# Patient Record
Sex: FEMALE | NOT HISPANIC OR LATINO | Employment: FULL TIME | URBAN - METROPOLITAN AREA
[De-identification: names, ages, dates, MRNs, and addresses within clinical notes are randomized per-mention and may not be internally consistent; named-entity substitution may affect disease eponyms.]

---

## 2017-01-06 DIAGNOSIS — Z98.890 S/P CRANIOTOMY: Primary | ICD-10-CM

## 2017-01-13 ENCOUNTER — HOSPITAL ENCOUNTER (OUTPATIENT)
Dept: CT IMAGING | Facility: CLINIC | Age: 52
Discharge: HOME | End: 2017-01-13
Attending: NEUROLOGICAL SURGERY | Admitting: NEUROLOGICAL SURGERY
Payer: COMMERCIAL

## 2017-01-13 ENCOUNTER — OFFICE VISIT (OUTPATIENT)
Dept: NEUROSURGERY | Facility: CLINIC | Age: 52
End: 2017-01-13
Attending: NURSE PRACTITIONER
Payer: COMMERCIAL

## 2017-01-13 VITALS
DIASTOLIC BLOOD PRESSURE: 82 MMHG | SYSTOLIC BLOOD PRESSURE: 131 MMHG | BODY MASS INDEX: 34.86 KG/M2 | WEIGHT: 204.2 LBS | HEART RATE: 114 BPM | HEIGHT: 64 IN | TEMPERATURE: 97.2 F | OXYGEN SATURATION: 97 %

## 2017-01-13 DIAGNOSIS — Z98.890 S/P CRANIOTOMY: ICD-10-CM

## 2017-01-13 DIAGNOSIS — S06.5XAA SDH (SUBDURAL HEMATOMA) (H): Primary | ICD-10-CM

## 2017-01-13 PROCEDURE — 70450 CT HEAD/BRAIN W/O DYE: CPT

## 2017-01-13 PROCEDURE — 99213 OFFICE O/P EST LOW 20 MIN: CPT | Performed by: NURSE PRACTITIONER

## 2017-01-13 PROCEDURE — 99211 OFF/OP EST MAY X REQ PHY/QHP: CPT | Performed by: NURSE PRACTITIONER

## 2017-01-13 ASSESSMENT — PAIN SCALES - GENERAL: PAINLEVEL: NO PAIN (0)

## 2017-01-13 NOTE — PATIENT INSTRUCTIONS
Plan:  -followup as needed  - Go to ER with any seizure activity, mental status change (increasing confusion), difficulty with speech or increasing or acute weakness

## 2017-01-13 NOTE — MR AVS SNAPSHOT
After Visit Summary   1/13/2017    Mary Franco    MRN: [id number]           Patient Information     Date Of Birth          1965        Visit Information        Provider Department      1/13/2017 3:00 PM Eufemia Aparicio APRN CNP [facility] Neurosurgery [facility] [place] [facility]        Care Instructions    Plan:  -followup as needed  - Go to ER with any seizure activity, mental status change (increasing confusion), difficulty with speech or increasing or acute weakness         Follow-ups after your visit        Who to contact     If you have questions or need follow up information about today's clinic visit or your schedule please contact [facility] NEUROSURGERY [facility] directly at [phone].  Normal or non-critical lab and imaging results will be communicated to you by [email or web address], letter or phone within 4 business days after the clinic has received the results. If you do not hear from us within 7 days, please contact the clinic through [email or web address] or phone. If you have a critical or abnormal lab result, we will notify you by phone as soon as possible.  Submit refill requests through [employer] or call your pharmacy and they will forward the refill request to us. Please allow 3 business days for your refill to be completed.          Additional Information About Your Visit        MyChart Information     [employer] gives you secure access to your electronic health record. If you see a primary care provider, you can also send messages to your care team and make appointments. If you have questions, please call your primary care clinic.  If you do not have a primary care provider, please call [phone] and they will assist you.        Care EveryWhere ID     This is your Care EveryWhere ID. This could be used by other organizations to access your [facility] medical records  [id number]        Your Vitals Were     Pulse Temperature Height BMI (Body Mass Index) Pulse Oximetry       114 97.2  F (36.2  C) (Oral) 5'  "4\" (1.626 m) 35.03 kg/m2 97%        Blood Pressure from Last 3 Encounters:   01/13/17 131/82   11/11/15 112/78   11/03/15 125/87    Weight from Last 3 Encounters:   01/13/17 204 lb 3.2 oz (92.625 kg)   11/11/15 198 lb 6.4 oz (89.994 kg)   11/03/15 194 lb 9.6 oz (88.27 kg)              Today, you had the following     No orders found for display       Primary Care Provider Office Phone # Fax #    Nisa Crook [phone]       [facility] [address]  [facility] [address]        Thank you!     Thank you for choosing [facility] NEUROSURGERY [facility]  for your care. Our goal is always to provide you with excellent care. Hearing back from our patients is one way we can continue to improve our services. Please take a few minutes to complete the written survey that you may receive in the mail after your visit with us. Thank you!             Your Updated Medication List - Protect others around you: Learn how to safely use, store and throw away your medicines at www.disposemymeds.org.          This list is accurate as of: 1/13/17  3:08 PM.  Always use your most recent med list.                   Brand Name Dispense Instructions for use    albuterol 108 (90 BASE) MCG/ACT Inhaler    PROAIR HFA/PROVENTIL HFA/VENTOLIN HFA    1 each    Inhale 2 puffs into the lungs every 6 hours as needed for wheezing       calcium-magnesium 500-250 MG Tabs per tablet    CALMAG     Take 1 tablet by mouth daily       cetirizine 10 MG tablet    ZYRTEC ALLERGY    30 tablet    Take 1 tablet (10 mg) by mouth daily       fluticasone 50 MCG/ACT spray    FLONASE    3 Package    Spray 2 sprays into both nostrils daily       multivitamin, therapeutic Tabs tablet      Take 1 tablet by mouth daily       OMEGA-3 FISH OIL PO      Take 1 g by mouth daily         "

## 2017-01-13 NOTE — PROGRESS NOTES
[facility]  Neurosurgery followup:    HPI: Ms. Franco is a 51 year old female that returns post Left frontotemporal parietal craniotomy for evacuation of subdural hematoma on 4/4/2015 with Dr. Willson. She is here today for follow up. She reports that she is doing very good. She has not had any headaches since after surgery. She does feel intermittent nerve type pain to the incision.  She is back to work full time.     Exam:  Constitutional:  Alert, well nourished, NAD.  HEENT: Normocephalic, atraumatic.   Pulm:  Without shortness of breath or audible adventitious respiratory sounds.  CV:  No pitting edema of BLE.  Brisk capillary refill, CMS intact.    Neurological:  Awake  Alert  Oriented x 3  Speech clear  Cranial nerves II - XII intact  PERRL  EOMI  Face symmetric  Tongue midline  Motor exam  5/5 strength in all four extremities.   Sensation normal  Finger to Nose smooth  Pronator drift negative    Gait: Able to stand from a seated position. Normal non-antalgic, non-myelopathic gait.    Imaging    CT scan:  IMPRESSION:     1. Postoperative changes in the left frontal and parietal regions..  2. Brain parenchyma remains normal. No focal lesions are seen.       A/P: Ms. Frnaco is a 51 year old female that returns post Left frontotemporal parietal craniotomy for evacuation of subdural hematoma on 4/4/2015 with Dr. Willson. She is here today for follow up. She reports that she is doing very good. She has not had any headaches since after surgery. She does feel intermittent nerve type pain to the incision.  She is back to work full time. She is almost 2 years post surgery and doing well. At this time we will have her follow up as needed. She is open to this.     Plan:  -followup as needed  - Go to ER with any seizure activity, mental status change (increasing confusion), difficulty with speech or increasing or acute weakness     Eufemia Aparicio [facility]  Spine and Brain Clinic  [facility]  [address]  [address]  [place] [address]    Tel [phone]  Pager [phone]

## 2017-01-13 NOTE — NURSING NOTE
"Mary Franco is a 51 year old female who presents for:  Chief Complaint   Patient presents with     Neurologic Problem     1 year follow up status post craniotomy, patient is feeling well no symptoms        Initial Vitals:  /82 mmHg  Pulse 114  Temp(Src) 97.2  F (36.2  C) (Oral)  Ht 5' 4\" (1.626 m)  Wt 204 lb 3.2 oz (92.625 kg)  BMI 35.03 kg/m2  SpO2 97% Estimated body mass index is 35.03 kg/(m^2) as calculated from the following:    Height as of this encounter: 5' 4\" (1.626 m).    Weight as of this encounter: 204 lb 3.2 oz (92.625 kg).. Body surface area is 2.05 meters squared. BP completed using cuff size: large  No Pain (0)    Do you feel safe in your environment?  Yes  Do you need any refills today? No    Nursing Comments: 1 year follow up status post craniotomy, patient is feeling well no symptoms.  Patient rates her pain today as 0      5 min. nursing intake time  Toshia Angelo MA       Discharge plan: Plan:  -followup as needed  - Go to ER with any seizure activity, mental status change (increasing confusion), difficulty with speech or increasing or acute weakness   2 min. nursing discharge time  Toshia Angelo MA            "

## 2017-03-10 ENCOUNTER — TELEPHONE (OUTPATIENT)
Dept: FAMILY MEDICINE | Facility: CLINIC | Age: 52
End: 2017-03-10

## 2017-03-10 NOTE — TELEPHONE ENCOUNTER
3/10/2017    Call Regarding Preventive Health Screening Colonoscopy, Mammogram and Physical    Attempt 1    Message on voicemail     Comments:       Outreach   KV

## 2017-06-15 NOTE — TELEPHONE ENCOUNTER
6/15/2017    Call Regarding Preventive Health Screening Colonoscopy and Mammogram    Attempt 2    Message on voicemail     Comments:       Outreach   cnt

## 2017-07-17 ENCOUNTER — OFFICE VISIT (OUTPATIENT)
Dept: FAMILY MEDICINE | Facility: CLINIC | Age: 52
End: 2017-07-17
Payer: COMMERCIAL

## 2017-07-17 ENCOUNTER — HOSPITAL ENCOUNTER (OUTPATIENT)
Dept: MAMMOGRAPHY | Facility: CLINIC | Age: 52
Discharge: HOME | End: 2017-07-17
Attending: INTERNAL MEDICINE | Admitting: INTERNAL MEDICINE
Payer: COMMERCIAL

## 2017-07-17 VITALS
HEIGHT: 64 IN | BODY MASS INDEX: 34.35 KG/M2 | WEIGHT: 201.2 LBS | DIASTOLIC BLOOD PRESSURE: 76 MMHG | SYSTOLIC BLOOD PRESSURE: 127 MMHG | OXYGEN SATURATION: 95 % | TEMPERATURE: 98.1 F | HEART RATE: 91 BPM

## 2017-07-17 DIAGNOSIS — Z00.00 PREVENTATIVE HEALTH CARE: ICD-10-CM

## 2017-07-17 DIAGNOSIS — S06.5XAA SUBDURAL HEMATOMA (H): ICD-10-CM

## 2017-07-17 DIAGNOSIS — R73.09 ABNORMAL GLUCOSE: Chronic | ICD-10-CM

## 2017-07-17 DIAGNOSIS — Z00.01 ENCOUNTER FOR PREVENTATIVE ADULT HEALTH CARE EXAM WITH ABNORMAL FINDINGS: Primary | ICD-10-CM

## 2017-07-17 DIAGNOSIS — J45.20 MILD INTERMITTENT ASTHMA WITHOUT COMPLICATION: Chronic | ICD-10-CM

## 2017-07-17 DIAGNOSIS — D50.9 MICROCYTIC ANEMIA: ICD-10-CM

## 2017-07-17 LAB
ERYTHROCYTE [DISTWIDTH] IN BLOOD BY AUTOMATED COUNT: 20.3 % (ref 10–15)
HBA1C MFR BLD: 7.3 % (ref 4.3–6)
HCT VFR BLD AUTO: 33.5 % (ref 35–47)
HGB BLD-MCNC: 10.7 G/DL (ref 11.7–15.7)
MCH RBC QN AUTO: 22.9 PG (ref 26.5–33)
MCHC RBC AUTO-ENTMCNC: 31.9 G/DL (ref 31.5–36.5)
MCV RBC AUTO: 72 FL (ref 78–100)
PLATELET # BLD AUTO: 435 10E9/L (ref 150–450)
RBC # BLD AUTO: 4.68 10E12/L (ref 3.8–5.2)
WBC # BLD AUTO: 5.8 10E9/L (ref 4–11)

## 2017-07-17 PROCEDURE — G0202 SCR MAMMO BI INCL CAD: HCPCS

## 2017-07-17 PROCEDURE — 83036 HEMOGLOBIN GLYCOSYLATED A1C: CPT | Performed by: INTERNAL MEDICINE

## 2017-07-17 PROCEDURE — 99396 PREV VISIT EST AGE 40-64: CPT | Performed by: INTERNAL MEDICINE

## 2017-07-17 PROCEDURE — 36415 COLL VENOUS BLD VENIPUNCTURE: CPT | Performed by: INTERNAL MEDICINE

## 2017-07-17 PROCEDURE — 80061 LIPID PANEL: CPT | Performed by: INTERNAL MEDICINE

## 2017-07-17 PROCEDURE — 85027 COMPLETE CBC AUTOMATED: CPT | Performed by: INTERNAL MEDICINE

## 2017-07-17 PROCEDURE — 80053 COMPREHEN METABOLIC PANEL: CPT | Performed by: INTERNAL MEDICINE

## 2017-07-17 RX ORDER — ALBUTEROL SULFATE 90 UG/1
2 AEROSOL, METERED RESPIRATORY (INHALATION) EVERY 6 HOURS PRN
Qty: 18 G | Refills: 3 | Status: SHIPPED | OUTPATIENT
Start: 2017-07-17 | End: 2022-03-18

## 2017-07-17 NOTE — LETTER
My Asthma Action Plan  Name: Mary Franco   YOB: 1965  Date: 7/17/2017   My doctor: Nisa Crook, DO   My clinic: [facility]        My Control Medicine: None  My Rescue Medicine: Albuterol   My Asthma Severity: intermittent  Avoid your asthma triggers: pollens, insects/rodents, mold, humidity and extreme exercise             GREEN ZONE   Good Control    I feel good    No cough or wheeze    Can work, sleep and play without asthma symptoms       Take your asthma control medicine every day.     1. If exercise triggers your asthma, take your rescue medication    15 minutes before exercise or sports, and    During exercise if you have asthma symptoms  2. Spacer to use with inhaler: If you have a spacer, make sure to use it with your inhaler             YELLOW ZONE Getting Worse  I have ANY of these:    I do not feel good    Cough or wheeze    Chest feels tight    Wake up at night   1. Keep taking your Green Zone medications  2. Start taking your rescue medicine:    every 20 minutes for up to 1 hour. Then every 4 hours for 24-48 hours.  3. If you stay in the Yellow Zone for more than 12-24 hours, contact your doctor.  4. If you do not return to the Green Zone in 12-24 hours or you get worse, start taking your oral steroid medicine if prescribed by your provider.           RED ZONE Medical Alert - Get Help  I have ANY of these:    I feel awful    Medicine is not helping    Breathing getting harder    Trouble walking or talking    Nose opens wide to breathe       1. Take your rescue medicine NOW  2. If your provider has prescribed an oral steroid medicine, start taking it NOW  3. Call your doctor NOW  4. If you are still in the Red Zone after 20 minutes and you have not reached your doctor:    Take your rescue medicine again and    Call 911 or go to the emergency room right away    See your regular doctor within 2 weeks of an Emergency Room or Urgent Care visit for follow-up treatment.         Electronically signed by: Nisa Crook, July 17, 2017    Annual Reminders:  Meet with Asthma Educator,  Flu Shot in the Fall, consider Pneumonia Vaccination for patients with asthma (aged 19 and older).    Pharmacy:    [facility] [place] - [address]  [id number] - [place], [place] - [address]                    Asthma Triggers  How To Control Things That Make Your Asthma Worse    Triggers are things that make your asthma worse.  Look at the list below to help you find your triggers and what you can do about them.  You can help prevent asthma flare-ups by staying away from your triggers.      Trigger                                                          What you can do   Cigarette Smoke  Tobacco smoke can make asthma worse. Do not allow smoking in your home, car or around you.  Be sure no one smokes at a child s day care or school.  If you smoke, ask your health care provider for ways to help you quit.  Ask family members to quit too.  Ask your health care provider for a referral to Quit Plan to help you quit smoking, or call 6-929-027-PLAN.     Colds, Flu, Bronchitis  These are common triggers of asthma. Wash your hands often.  Don t touch your eyes, nose or mouth.  Get a flu shot every year.     Dust Mites  These are tiny bugs that live in cloth or carpet. They are too small to see. Wash sheets and blankets in hot water every week.   Encase pillows and mattress in dust mite proof covers.  Avoid having carpet if you can. If you have carpet, vacuum weekly.   Use a dust mask and HEPA vacuum.   Pollen and Outdoor Mold  Some people are allergic to trees, grass, or weed pollen, or molds. Try to keep your windows closed.  Limit time out doors when pollen count is high.   Ask you health care provider about taking medicine during allergy season.     Animal Dander  Some people are allergic to skin flakes, urine or saliva from pets with fur or  feathers. Keep pets with fur or feathers out of your home.    If you can t keep the pet outdoors, then keep the pet out of your bedroom.  Keep the bedroom door closed.  Keep pets off cloth furniture and away from stuffed toys.     Mice, Rats, and Cockroaches  Some people are allergic to the waste from these pests.   Cover food and garbage.  Clean up spills and food crumbs.  Store grease in the refrigerator.   Keep food out of the bedroom.   Indoor Mold  This can be a trigger if your home has high moisture. Fix leaking faucets, pipes, or other sources of water.   Clean moldy surfaces.  Dehumidify basement if it is damp and smelly.   Smoke, Strong Odors, and Sprays  These can reduce air quality. Stay away from strong odors and sprays, such as perfume, powder, hair spray, paints, smoke incense, paint, cleaning products, candles and new carpet.   Exercise or Sports  Some people with asthma have this trigger. Be active!  Ask your doctor about taking medicine before sports or exercise to prevent symptoms.    Warm up for 5-10 minutes before and after sports or exercise.     Other Triggers of Asthma  Cold air:  Cover your nose and mouth with a scarf.  Sometimes laughing or crying can be a trigger.  Some medicines and food can trigger asthma.

## 2017-07-17 NOTE — PATIENT INSTRUCTIONS
Labs today  Let me know if snoring is worse  Try the Nasacort instead of the Flonase to see if less prone to nose bleeds  I refilled your Albuterol  Follow up annually or as needed    Preventive Health Recommendations  Female Ages 50 - 64    Yearly exam: See your health care provider every year in order to  o Review health changes.   o Discuss preventive care.    o Review your medicines if your doctor has prescribed any.      Get a Pap test every three years (unless you have an abnormal result and your provider advises testing more often).    If you get Pap tests with HPV test, you only need to test every 5 years, unless you have an abnormal result.     You do not need a Pap test if your uterus was removed (hysterectomy) and you have not had cancer.    You should be tested each year for STDs (sexually transmitted diseases) if you're at risk.     Have a mammogram every 1 to 2 years.    Have a colonoscopy at age 50, or have a yearly FIT test (stool test). These exams screen for colon cancer.      Have a cholesterol test every 5 years, or more often if advised.    Have a diabetes test (fasting glucose) every three years. If you are at risk for diabetes, you should have this test more often.     If you are at risk for osteoporosis (brittle bone disease), think about having a bone density scan (DEXA).    Shots: Get a flu shot each year. Get a tetanus shot every 10 years.    Nutrition:     Eat at least 5 servings of fruits and vegetables each day.    Eat whole-grain bread, whole-wheat pasta and brown rice instead of white grains and rice.    Talk to your provider about Calcium and Vitamin D.     Lifestyle    Exercise at least 150 minutes a week (30 minutes a day, 5 days a week). This will help you control your weight and prevent disease.    Limit alcohol to one drink per day.    No smoking.     Wear sunscreen to prevent skin cancer.     See your dentist every six months for an exam and cleaning.    See your eye doctor  every 1 to 2 years.

## 2017-07-17 NOTE — MR AVS SNAPSHOT
After Visit Summary   7/17/2017    Mary Franco    MRN: [id number]           Patient Information     Date Of Birth          1965        Visit Information        Provider Department      7/17/2017 11:30 AM Nisa Crook,  [facility]        Today's Diagnoses     Encounter for preventative adult health care exam with abnormal findings    -  1    Mild intermittent asthma without complication        Subdural hematoma (H)        Abnormal glucose          Care Instructions    Labs today  Let me know if snoring is worse  Try the Nasacort instead of the Flonase to see if less prone to nose bleeds  I refilled your Albuterol  Follow up annually or as needed    Preventive Health Recommendations  Female Ages 50 - 64    Yearly exam: See your health care provider every year in order to  o Review health changes.   o Discuss preventive care.    o Review your medicines if your doctor has prescribed any.      Get a Pap test every three years (unless you have an abnormal result and your provider advises testing more often).    If you get Pap tests with HPV test, you only need to test every 5 years, unless you have an abnormal result.     You do not need a Pap test if your uterus was removed (hysterectomy) and you have not had cancer.    You should be tested each year for STDs (sexually transmitted diseases) if you're at risk.     Have a mammogram every 1 to 2 years.    Have a colonoscopy at age 50, or have a yearly FIT test (stool test). These exams screen for colon cancer.      Have a cholesterol test every 5 years, or more often if advised.    Have a diabetes test (fasting glucose) every three years. If you are at risk for diabetes, you should have this test more often.     If you are at risk for osteoporosis (brittle bone disease), think about having a bone density scan (DEXA).    Shots: Get a flu shot each year. Get a tetanus shot every 10 years.    Nutrition:     Eat at least 5 servings of  fruits and vegetables each day.    Eat whole-grain bread, whole-wheat pasta and brown rice instead of white grains and rice.    Talk to your provider about Calcium and Vitamin D.     Lifestyle    Exercise at least 150 minutes a week (30 minutes a day, 5 days a week). This will help you control your weight and prevent disease.    Limit alcohol to one drink per day.    No smoking.     Wear sunscreen to prevent skin cancer.     See your dentist every six months for an exam and cleaning.    See your eye doctor every 1 to 2 years.            Follow-ups after your visit        Your next 10 appointments already scheduled     Aug 14, 2017   Procedure with Katja Paredes MD   [facility] ([facility])    [address]  [place] [address] [phone]           [facility] is located at [address]              Who to contact     If you have questions or need follow up information about today's clinic visit or your schedule please contact [facility] directly at [phone].  Normal or non-critical lab and imaging results will be communicated to you by [employer], letter or phone within 4 business days after the clinic has received the results. If you do not hear from us within 7 days, please contact the clinic through [employer] or phone. If you have a critical or abnormal lab result, we will notify you by phone as soon as possible.  Submit refill requests through [employer] or call your pharmacy and they will forward the refill request to us. Please allow 3 business days for your refill to be completed.          Additional Information About Your Visit        [employer] Information     [employer] gives you secure access to your electronic health record. If you see a primary care provider, you can also send messages to your care team and make appointments. If you have questions, please call your primary care clinic.  If you do not have a primary care  "provider, please call [phone] and they will assist you.        Care EveryWhere ID     This is your Care EveryWhere ID. This could be used by other organizations to access your [facility] medical records  [id number]        Your Vitals Were     Pulse Temperature Height Pulse Oximetry Breastfeeding? BMI (Body Mass Index)    91 98.1  F (36.7  C) (Oral) 5' 4\" (1.626 m) 95% No 34.54 kg/m2       Blood Pressure from Last 3 Encounters:   07/17/17 127/76   01/13/17 131/82   11/11/15 112/78    Weight from Last 3 Encounters:   07/17/17 201 lb 3.2 oz (91.3 kg)   01/13/17 204 lb 3.2 oz (92.6 kg)   11/11/15 198 lb 6.4 oz (90 kg)              We Performed the Following     Asthma Action Plan (AAP)     CBC with platelets     Comprehensive metabolic panel     Hemoglobin A1c     Lipid panel reflex to direct LDL          Today's Medication Changes          These changes are accurate as of: 7/17/17 12:18 PM.  If you have any questions, ask your nurse or doctor.               Stop taking these medicines if you haven't already. Please contact your care team if you have questions.     fluticasone 50 MCG/ACT spray   Commonly known as:  FLONASE   Stopped by:  Nisa Crook DO                Where to get your medicines      These medications were sent to [facility] [address] [place], [place] - [address]  [address] [facility] [address]     Phone:  [phone]     albuterol 108 (90 BASE) MCG/ACT Inhaler                Primary Care Provider Office Phone # Fax #    Nisa Crook [phone]       [facility] [address]  [facility] [address]        Equal Access to Services     OUMOU LERMA AH: Hadii aad ku tezo Soirvinali, waaxda luqadaha, qaybta kaalmada adeegyada, kristin stark. So [facility] [phone].    ATENCIÓN: Si habla español, tiene a burk disposición servicios gratuitos de asistencia lingüística. Bean chun [phone].    We comply " with applicable federal civil rights laws and [place] laws. We do not discriminate on the basis of race, color, national origin, age, disability sex, sexual orientation or gender identity.            Thank you!     Thank you for choosing [facility]  for your care. Our goal is always to provide you with excellent care. Hearing back from our patients is one way we can continue to improve our services. Please take a few minutes to complete the written survey that you may receive in the mail after your visit with us. Thank you!             Your Updated Medication List - Protect others around you: Learn how to safely use, store and throw away your medicines at www.disposemymeds.org.          This list is accurate as of: 7/17/17 12:18 PM.  Always use your most recent med list.                   Brand Name Dispense Instructions for use Diagnosis    albuterol 108 (90 BASE) MCG/ACT Inhaler    PROAIR HFA/PROVENTIL HFA/VENTOLIN HFA    18 g    Inhale 2 puffs into the lungs every 6 hours as needed for wheezing    Mild intermittent asthma without complication       calcium-magnesium 500-250 MG Tabs per tablet    CALMAG     Take 1 tablet by mouth daily        cetirizine 10 MG tablet    ZYRTEC ALLERGY    30 tablet    Take 1 tablet (10 mg) by mouth daily    Nasal congestion       multivitamin, therapeutic Tabs tablet      Take 1 tablet by mouth daily        OMEGA-3 FISH OIL PO      Take 1 g by mouth daily

## 2017-07-17 NOTE — PROGRESS NOTES
SUBJECTIVE:   CC: Mary Franco is an 51 year old woman who presents for preventive health visit.     Healthy Habits:    Do you get at least three servings of calcium containing foods daily (dairy, green leafy vegetables, etc.)? yes    Amount of exercise or daily activities, outside of work: 4-5 day(s) per week --> walking up to 15,000-20,000 steps per day    Problems taking medications regularly: Yes, Flonase stopped d/t development of a bloody nose - went to  and no recurrent bleeding since    Medication side effects: As above re: flonase    Have you had an eye exam in the past two years? yes    Do you see a dentist twice per year? yes    Do you have sleep apnea, excessive snoring or daytime drowsiness? Yes - sneezing some; does snore and daughter-in-law notices it. No witnessed apneas.      Mary has been well.  +Seasonal allergies ; asthma ok  No breast changes - mammogram done this AM  Colonoscopy set up as is now 50 yo  Still having periods which are regular in timing but varies in flow heaviness - last was July 4th. Cycles can be heavy at times.  No chest pain or dyspnea or skin changes.  Working on weight loss.     Today's PHQ-2 Score:   PHQ-2 ( 1999 Pfizer) 7/17/2017 11/11/2015   Q1: Little interest or pleasure in doing things 0 0   Q2: Feeling down, depressed or hopeless 0 0   PHQ-2 Score 0 0       Abuse: Current or Past(Physical, Sexual or Emotional) - No  Do you feel safe in your environment - Yes    Social History   Substance Use Topics     Smoking status: Never Smoker     Smokeless tobacco: Never Used     Alcohol use No     The patient does not drink >3 drinks per day nor >7 drinks per week.    Reviewed orders with patient.  Reviewed health maintenance and updated orders accordingly - Yes  Patient over age 50, mutual decision to screen reflected in health maintenance.  Pertinent mammograms are reviewed under the imaging tab.  History of abnormal Pap smear: NO - age 30-65 PAP every 5 years with  "negative HPV co-testing recommended    ROS:  Comprehensive ROS negative unless as stated above in HPI.      OBJECTIVE:   /76 (BP Location: Right arm, Patient Position: Chair, Cuff Size: Adult Regular)  Pulse 91  Temp 98.1  F (36.7  C) (Oral)  Ht 5' 4\" (1.626 m)  Wt 201 lb 3.2 oz (91.3 kg)  SpO2 95%  Breastfeeding? No  BMI 34.54 kg/m2  EXAM:  GENERAL: healthy, alert and no distress  EYES: Eyes grossly normal to inspection, PERRL and conjunctivae and sclerae normal  HENT: ear canals and TM's normal, nose and mouth without ulcers or lesions  NECK: no adenopathy, no asymmetry, masses, or scars and thyroid normal to palpation  RESP: lungs clear to auscultation - no rales, rhonchi or wheezes  BREAST: normal without masses, tenderness or nipple discharge and no palpable axillary masses or adenopathy  CV: regular rate and rhythm, normal S1 S2, no S3 or S4, no murmur, click or rub, no peripheral edema   ABDOMEN: soft, nontender, no hepatosplenomegaly, no masses and bowel sounds normal; limited d/t obesity  MS: no gross musculoskeletal defects noted, no edema  SKIN: no suspicious lesions or rashes  NEURO: Normal strength and tone, mentation intact and speech normal  PSYCH: mentation appears normal, affect normal/bright    ASSESSMENT/PLAN:   1. Encounter for preventative adult health care exam with abnormal findings  Future colonoscopy  - CBC with platelets  - Comprehensive metabolic panel  - Lipid panel reflex to direct LDL    2. Mild intermittent asthma without complication  Stable  ACT Total Scores 6/23/2015 7/17/2017   ACT TOTAL SCORE 23 -   ASTHMA ER VISITS 0 = None -   ASTHMA HOSPITALIZATIONS 0 = None -   ACT TOTAL SCORE (Goal Greater than or Equal to 20) - 21   In the past 12 months, how many times did you visit the emergency room for your asthma without being admitted to the hospital? - 0   In the past 12 months, how many times were you hospitalized overnight because of your asthma? - 0     - Asthma Action " "Plan (AAP)  - albuterol (PROAIR HFA/PROVENTIL HFA/VENTOLIN HFA) 108 (90 BASE) MCG/ACT Inhaler; Inhale 2 puffs into the lungs every 6 hours as needed for wheezing  Dispense: 18 g; Refill: 3    3. Subdural hematoma (H)  Feels very well s/p treatment of this with craniotomy per neurosurg a couple years ago and can now f/u PRN with them  Headaches resolved  Historically had mild lingering balance issues afterwards but she did not bring that up today    4. Abnormal glucose  - Hemoglobin A1c    5. Microcytic anemia  Heavy periods  - Ferritin    COUNSELING:   Reviewed preventive health counseling, as reflected in patient instructions       Regular exercise       Healthy diet/nutrition   reports that she has never smoked. She has never used smokeless tobacco.  Estimated body mass index is 34.54 kg/(m^2) as calculated from the following:    Height as of this encounter: 5' 4\" (1.626 m).    Weight as of this encounter: 201 lb 3.2 oz (91.3 kg).   Weight management plan: Discussed healthy diet and exercise guidelines and patient will follow up in 12 months in clinic to re-evaluate.    Patient Instructions   Labs today  Let me know if snoring is worse  Try the Nasacort instead of the Flonase to see if less prone to nose bleeds  I refilled your Albuterol  Follow up annually or as needed      Nisa Crook, DO  [facility]  "

## 2017-07-17 NOTE — NURSING NOTE
"Chief Complaint   Patient presents with     Physical       Initial /76 (BP Location: Right arm, Patient Position: Chair, Cuff Size: Adult Regular)  Pulse 91  Temp 98.1  F (36.7  C) (Oral)  Ht 5' 4\" (1.626 m)  Wt 201 lb 3.2 oz (91.3 kg)  SpO2 95%  Breastfeeding? No  BMI 34.54 kg/m2 Estimated body mass index is 34.54 kg/(m^2) as calculated from the following:    Height as of this encounter: 5' 4\" (1.626 m).    Weight as of this encounter: 201 lb 3.2 oz (91.3 kg).  Medication Reconciliation: complete     EMILY Alfaro      "

## 2017-07-18 LAB
ALBUMIN SERPL-MCNC: 3.8 G/DL (ref 3.4–5)
ALP SERPL-CCNC: 73 U/L (ref 40–150)
ALT SERPL W P-5'-P-CCNC: 26 U/L (ref 0–50)
ANION GAP SERPL CALCULATED.3IONS-SCNC: 6 MMOL/L (ref 3–14)
AST SERPL W P-5'-P-CCNC: 17 U/L (ref 0–45)
BILIRUB SERPL-MCNC: 0.5 MG/DL (ref 0.2–1.3)
BUN SERPL-MCNC: 12 MG/DL (ref 7–30)
CALCIUM SERPL-MCNC: 9 MG/DL (ref 8.5–10.1)
CHLORIDE SERPL-SCNC: 102 MMOL/L (ref 94–109)
CHOLEST SERPL-MCNC: 213 MG/DL
CO2 SERPL-SCNC: 30 MMOL/L (ref 20–32)
CREAT SERPL-MCNC: 0.65 MG/DL (ref 0.52–1.04)
GFR SERPL CREATININE-BSD FRML MDRD: ABNORMAL ML/MIN/1.7M2
GLUCOSE SERPL-MCNC: 123 MG/DL (ref 70–99)
HDLC SERPL-MCNC: 51 MG/DL
LDLC SERPL CALC-MCNC: 144 MG/DL
NONHDLC SERPL-MCNC: 162 MG/DL
POTASSIUM SERPL-SCNC: 3.9 MMOL/L (ref 3.4–5.3)
PROT SERPL-MCNC: 7.8 G/DL (ref 6.8–8.8)
SODIUM SERPL-SCNC: 138 MMOL/L (ref 133–144)
TRIGL SERPL-MCNC: 88 MG/DL

## 2017-07-18 ASSESSMENT — ASTHMA QUESTIONNAIRES: ACT_TOTALSCORE: 21

## 2017-07-20 ENCOUNTER — TELEPHONE (OUTPATIENT)
Dept: FAMILY MEDICINE | Facility: CLINIC | Age: 52
End: 2017-07-20

## 2017-07-20 DIAGNOSIS — E78.5 HYPERLIPIDEMIA LDL GOAL <100: ICD-10-CM

## 2017-07-20 DIAGNOSIS — E11.9 TYPE 2 DIABETES MELLITUS WITHOUT COMPLICATION, WITHOUT LONG-TERM CURRENT USE OF INSULIN (H): Primary | ICD-10-CM

## 2017-07-20 DIAGNOSIS — D50.9 MICROCYTIC ANEMIA: ICD-10-CM

## 2017-07-20 DIAGNOSIS — E66.9 NON MORBID OBESITY, UNSPECIFIED OBESITY TYPE: Chronic | ICD-10-CM

## 2017-07-20 PROCEDURE — 82728 ASSAY OF FERRITIN: CPT | Performed by: INTERNAL MEDICINE

## 2017-07-20 PROCEDURE — 84443 ASSAY THYROID STIM HORMONE: CPT | Performed by: INTERNAL MEDICINE

## 2017-07-20 PROCEDURE — 36415 COLL VENOUS BLD VENIPUNCTURE: CPT | Performed by: INTERNAL MEDICINE

## 2017-07-21 LAB
FERRITIN SERPL-MCNC: 11 NG/ML (ref 8–252)
TSH SERPL DL<=0.005 MIU/L-ACNC: 0.74 MU/L (ref 0.4–4)

## 2017-07-26 PROBLEM — D50.9 IRON DEFICIENCY ANEMIA, UNSPECIFIED IRON DEFICIENCY ANEMIA TYPE: Status: ACTIVE | Noted: 2017-07-26

## 2017-08-14 ENCOUNTER — SURGERY (OUTPATIENT)
Age: 52
End: 2017-08-14

## 2017-08-14 ENCOUNTER — TRANSFERRED RECORDS (OUTPATIENT)
Dept: HEALTH INFORMATION MANAGEMENT | Facility: CLINIC | Age: 52
End: 2017-08-14

## 2017-08-14 ENCOUNTER — HOSPITAL ENCOUNTER (OUTPATIENT)
Facility: CLINIC | Age: 52
Discharge: HOME | End: 2017-08-14
Attending: COLON & RECTAL SURGERY | Admitting: COLON & RECTAL SURGERY
Payer: COMMERCIAL

## 2017-08-14 VITALS
DIASTOLIC BLOOD PRESSURE: 85 MMHG | OXYGEN SATURATION: 98 % | HEIGHT: 63 IN | WEIGHT: 198 LBS | SYSTOLIC BLOOD PRESSURE: 113 MMHG | BODY MASS INDEX: 35.08 KG/M2 | HEART RATE: 94 BPM | RESPIRATION RATE: 18 BRPM

## 2017-08-14 LAB — COLONOSCOPY: NORMAL

## 2017-08-14 PROCEDURE — G0500 MOD SEDAT ENDO SERVICE >5YRS: HCPCS | Performed by: COLON & RECTAL SURGERY

## 2017-08-14 PROCEDURE — 88305 TISSUE EXAM BY PATHOLOGIST: CPT | Performed by: COLON & RECTAL SURGERY

## 2017-08-14 PROCEDURE — 99153 MOD SED SAME PHYS/QHP EA: CPT

## 2017-08-14 PROCEDURE — 45380 COLONOSCOPY AND BIOPSY: CPT | Mod: PT,XU | Performed by: COLON & RECTAL SURGERY

## 2017-08-14 PROCEDURE — 25000128 H RX IP 250 OP 636: Performed by: COLON & RECTAL SURGERY

## 2017-08-14 PROCEDURE — 45385 COLONOSCOPY W/LESION REMOVAL: CPT | Performed by: COLON & RECTAL SURGERY

## 2017-08-14 PROCEDURE — 88305 TISSUE EXAM BY PATHOLOGIST: CPT | Mod: 26,59 | Performed by: COLON & RECTAL SURGERY

## 2017-08-14 RX ORDER — NALOXONE HYDROCHLORIDE 0.4 MG/ML
.1-.4 INJECTION, SOLUTION INTRAMUSCULAR; INTRAVENOUS; SUBCUTANEOUS
Status: DISCONTINUED | OUTPATIENT
Start: 2017-08-14 | End: 2017-08-14 | Stop reason: HOSPADM

## 2017-08-14 RX ORDER — FENTANYL CITRATE 50 UG/ML
INJECTION, SOLUTION INTRAMUSCULAR; INTRAVENOUS PRN
Status: DISCONTINUED | OUTPATIENT
Start: 2017-08-14 | End: 2017-08-14 | Stop reason: HOSPADM

## 2017-08-14 RX ORDER — ONDANSETRON 4 MG/1
4 TABLET, ORALLY DISINTEGRATING ORAL EVERY 6 HOURS PRN
Status: DISCONTINUED | OUTPATIENT
Start: 2017-08-14 | End: 2017-08-14 | Stop reason: HOSPADM

## 2017-08-14 RX ORDER — ONDANSETRON 2 MG/ML
4 INJECTION INTRAMUSCULAR; INTRAVENOUS
Status: DISCONTINUED | OUTPATIENT
Start: 2017-08-14 | End: 2017-08-14 | Stop reason: HOSPADM

## 2017-08-14 RX ORDER — FLUMAZENIL 0.1 MG/ML
0.2 INJECTION, SOLUTION INTRAVENOUS
Status: DISCONTINUED | OUTPATIENT
Start: 2017-08-14 | End: 2017-08-14 | Stop reason: HOSPADM

## 2017-08-14 RX ORDER — ONDANSETRON 2 MG/ML
4 INJECTION INTRAMUSCULAR; INTRAVENOUS EVERY 6 HOURS PRN
Status: DISCONTINUED | OUTPATIENT
Start: 2017-08-14 | End: 2017-08-14 | Stop reason: HOSPADM

## 2017-08-14 RX ORDER — LIDOCAINE 40 MG/G
CREAM TOPICAL
Status: DISCONTINUED | OUTPATIENT
Start: 2017-08-14 | End: 2017-08-14 | Stop reason: HOSPADM

## 2017-08-14 RX ADMIN — MIDAZOLAM HYDROCHLORIDE 2 MG: 1 INJECTION, SOLUTION INTRAMUSCULAR; INTRAVENOUS at 07:27

## 2017-08-14 RX ADMIN — FENTANYL CITRATE 100 MCG: 50 INJECTION, SOLUTION INTRAMUSCULAR; INTRAVENOUS at 07:26

## 2017-08-14 NOTE — H&P
Pre-Endoscopy History and Physical     Mary Franco MRN# [id number]   YOB: 1965 Age: 52 year old     Date of Procedure: 8/14/2017  Primary care provider: Nisa Crook  Type of Endoscopy: Colonoscopy  Reason for Procedure: screening  Type of Anesthesia Anticipated: Moderate Sedation    HPI:    Mary is a 52 year old female who will be undergoing the above procedure.      A history and physical has been performed. The patient's medications and allergies have been reviewed. The risks and benefits of the procedure and the sedation options and risks were discussed with the patient.  All questions were answered and informed consent was obtained.      She denies a personal or family history of anesthesia complications or bleeding disorders.     Allergies   Allergen Reactions     Penicillins Shortness Of Breath, Hives and Difficulty breathing     Vicodin [Hydrocodone-Acetaminophen] Other (See Comments)     Severe headache            Current Facility-Administered Medications on File Prior to Encounter:  ondansetron (ZOFRAN) injection     Current Outpatient Prescriptions on File Prior to Encounter:  calcium-magnesium (CALMAG) 500-250 MG TABS Take 1 tablet by mouth daily   cetirizine (ZYRTEC ALLERGY) 10 MG tablet Take 1 tablet (10 mg) by mouth daily   multivitamin, therapeutic (THERA-VIT) TABS Take 1 tablet by mouth daily   Omega-3 Fatty Acids (OMEGA-3 FISH OIL PO) Take 1 g by mouth daily       Patient Active Problem List   Diagnosis     Subdural hematoma (H)     Mild intermittent asthma     History of cranioplasty     Obesity     Seasonal allergies     Type 2 diabetes mellitus without complication, without long-term current use of insulin (H)     Hyperlipidemia LDL goal <100     Iron deficiency anemia, unspecified iron deficiency anemia type        Past Medical History:   Diagnosis Date     Elevated blood sugar 2015     Obesity      Seasonal allergies      Subdural haemorrhage 2015    Large left-sided      "Uncomplicated asthma         Past Surgical History:   Procedure Laterality Date     CRANIOPLASTY Left 2015    Procedure: CRANIOPLASTY;  Surgeon: Yovani Willson MD;  Location: [facility]     CRANIOTOMY Left 2015    Procedure: CRANIOTOMY;  Surgeon: Yovani Willson MD;  Location: [facility] OR     CRANIOTOMY Left 2015    Procedure: CRANIOTOMY;  Surgeon: Yovani Willson MD;  Location: [facility] OR     CRANIOTOMY Left        Social History   Substance Use Topics     Smoking status: Never Smoker     Smokeless tobacco: Never Used     Alcohol use No       Family History   Problem Relation Age of Onset     DIABETES Father      DIABETES Maternal Grandmother      kindey failue     HEART DISEASE Maternal Grandmother      heart attack      DIABETES Maternal Grandfather      Hyperlipidemia Mother      ?High glucose       REVIEW OF SYSTEMS:     5 point ROS negative except as noted above in HPI, including Gen., Resp., CV, GI &  system review.      PHYSICAL EXAM:   /87  Pulse 94  Resp 16  Ht 1.6 m (5' 3\")  Wt 89.8 kg (198 lb)  SpO2 96%  BMI 35.07 kg/m2 Estimated body mass index is 35.07 kg/(m^2) as calculated from the following:    Height as of this encounter: 1.6 m (5' 3\").    Weight as of this encounter: 89.8 kg (198 lb).   GENERAL APPEARANCE: healthy and alert  MENTAL STATUS: alert  AIRWAY EXAM: Mallampatti Class I (visualization of the soft palate, fauces, uvula, anterior and posterior pillars)  RESP: lungs clear to auscultation - no rales, rhonchi or wheezes  CV: regular rates and rhythm      IMPRESSION   ASA Class 2 - Mild systemic disease        PLAN:     Plan for colonoscopy. We discussed the risks, benefits and alternatives and the patient wished to proceed.    The above has been forwarded to the consulting provider.      Katja Paredes MD  [facility]  Phone: [phone]  Fax: [phone]  2017    "

## 2017-08-15 LAB — COPATH REPORT: NORMAL

## 2017-08-24 ENCOUNTER — ALLIED HEALTH/NURSE VISIT (OUTPATIENT)
Dept: EDUCATION SERVICES | Facility: CLINIC | Age: 52
End: 2017-08-24
Payer: COMMERCIAL

## 2017-08-24 DIAGNOSIS — E11.9 TYPE 2 DIABETES MELLITUS WITHOUT COMPLICATION, WITHOUT LONG-TERM CURRENT USE OF INSULIN (H): Primary | ICD-10-CM

## 2017-08-24 PROCEDURE — G0108 DIAB MANAGE TRN  PER INDIV: HCPCS

## 2017-08-24 NOTE — MR AVS SNAPSHOT
After Visit Summary   8/24/2017    Mary Franco    MRN: [id number]           Patient Information     Date Of Birth          1965        Visit Information        Provider Department      8/24/2017 8:00 AM  DIABETIC ED RESOURCE [facility]        Care Instructions    My Diabetes Care Goals:    Healthy Eating: aim for 30 grams of carb per meal and 15 grams of carb for snacks.     Follow up:  Call ([phone]), e-mail ([email or web address]), or send [employer] message with questions, concerns or if follow-up is needed.     Bring blood glucose meter and logbook with you to all doctor and follow-up appointments.     [place] [facility] for the [facility]:  For Your Diabetes Education and Nutrition Appointments Call:  [phone]   For Diabetes Education or Nutrition Related Questions:   Phone: [phone]  E-mail: [email or web address]  Fax: [phone]   If you need a medication refill please contact your pharmacy. Please allow 3 business days for your refills to be completed.    Instructions for emailing the Diabetes Educators    If you need to communicate a non-urgent message to a Diabetes Educator via email, please send to [email or web address].    Please follow the following email guidelines:    Subject line: Secure: your clinic name (example: [facility]: [facility])  In the email please include: First name, middle initial, last name and date of birth.    We will be in touch with you within one (1) business day.             Follow-ups after your visit        Your next 10 appointments already scheduled     Sep 28, 2017  8:00 AM CDT   Diabetic Education with  DIABETIC ED RESOURCE   [facility]    [address] [place] [id number] [phone]              Who to contact     If you have questions or need follow up information about today's clinic visit or your schedule please contact  [facility] directly at [phone].  Normal or non-critical lab and imaging results will be communicated to you by MyChart, letter or phone within 4 business days after the clinic has received the results. If you do not hear from us within 7 days, please contact the clinic through MyChart or phone. If you have a critical or abnormal lab result, we will notify you by phone as soon as possible.  Submit refill requests through [employer] or call your pharmacy and they will forward the refill request to us. Please allow 3 business days for your refill to be completed.          Additional Information About Your Visit        [employer] Information     [employer] gives you secure access to your electronic health record. If you see a primary care provider, you can also send messages to your care team and make appointments. If you have questions, please call your primary care clinic.  If you do not have a primary care provider, please call [phone] and they will assist you.        Care EveryWhere ID     This is your Care EveryWhere ID. This could be used by other organizations to access your [place] medical records  [id number]         Blood Pressure from Last 3 Encounters:   08/14/17 113/85   07/17/17 127/76   01/13/17 131/82    Weight from Last 3 Encounters:   08/14/17 89.8 kg (198 lb)   07/17/17 91.3 kg (201 lb 3.2 oz)   01/13/17 92.6 kg (204 lb 3.2 oz)              Today, you had the following     No orders found for display       Primary Care Provider Office Phone # Fax #    Nisa Britton DO [place] [phone] [address]  [facility] [address]        Equal Access to Services     OUMOU LERMA AH: Hadii tye sandoval hadashosmar Somayte, waaxda luqadaha, qaybta kaalmada kristin eddy. So [facility] [phone].    ATENCIÓN: Si habla español, tiene a burk disposición servicios gratuitos de asistencia lingüística. Llame al [phone].    We comply with applicable  federal civil rights laws and [place] laws. We do not discriminate on the basis of race, color, national origin, age, disability sex, sexual orientation or gender identity.            Thank you!     Thank you for choosing [facility]  for your care. Our goal is always to provide you with excellent care. Hearing back from our patients is one way we can continue to improve our services. Please take a few minutes to complete the written survey that you may receive in the mail after your visit with us. Thank you!             Your Updated Medication List - Protect others around you: Learn how to safely use, store and throw away your medicines at www.disposemymeds.org.          This list is accurate as of: 8/24/17  9:01 AM.  Always use your most recent med list.                   Brand Name Dispense Instructions for use Diagnosis    albuterol 108 (90 BASE) MCG/ACT Inhaler    PROAIR HFA/PROVENTIL HFA/VENTOLIN HFA    18 g    Inhale 2 puffs into the lungs every 6 hours as needed for wheezing    Mild intermittent asthma without complication       ASPIRIN EC PO      Take 81 mg by mouth daily        calcium-magnesium 500-250 MG Tabs per tablet    CALMAG     Take 1 tablet by mouth daily        cetirizine 10 MG tablet    ZYRTEC ALLERGY    30 tablet    Take 1 tablet (10 mg) by mouth daily    Nasal congestion       ferrous sulfate 142 (45 FE) MG Tbcr    SLO-FE    90 tablet    Take 1 tablet (142 mg) by mouth daily    Iron deficiency anemia, unspecified iron deficiency anemia type       multivitamin, therapeutic Tabs tablet      Take 1 tablet by mouth daily        OMEGA-3 FISH OIL PO      Take 1 g by mouth daily

## 2017-08-24 NOTE — PATIENT INSTRUCTIONS
My Diabetes Care Goals:    Healthy Eating: aim for 30 grams of carb per meal and 15 grams of carb for snacks.     Follow up:  Call ([phone]), e-mail ([email or web address]), or send [employer] message with questions, concerns or if follow-up is needed.     Bring blood glucose meter and logbook with you to all doctor and follow-up appointments.     [place] [facility] for the [facility]:  For Your Diabetes Education and Nutrition Appointments Call:  [phone]   For Diabetes Education or Nutrition Related Questions:   Phone: [phone]  E-mail: [email or web address]  Fax: [phone]   If you need a medication refill please contact your pharmacy. Please allow 3 business days for your refills to be completed.    Instructions for emailing the Diabetes Educators    If you need to communicate a non-urgent message to a Diabetes Educator via email, please send to [email or web address].    Please follow the following email guidelines:    Subject line: Secure: your clinic name (example: [facility]: [facility])  In the email please include: First name, middle initial, last name and date of birth.    We will be in touch with you within one (1) business day.

## 2017-08-24 NOTE — PROGRESS NOTES
Diabetes Self Management Training: Initial Assessment Visit for Newly Diagnosed Patients (Complete AADE Goals Flowsheet)    Mary Franco presents today for education related to Type 2 diabetes.    She is accompanied by self    Patient's diabetes management related comments/concerns: wants to lose weight    Patient's emotional response to diabetes: expresses readiness to learn and concern for health and well-being    Patient would like this visit to be focused around the following diabetes-related behaviors and goals: Healthy Eating    ASSESSMENT:  Patient Problem List and Family Medical History reviewed for relevant medical history, current medical status, and diabetes risk factors.    Current Diabetes Management per Patient:  Taking diabetes medications? no    Past Diabetes Education: No    Patient glucose self monitoring as follows: never.   Patient's most recent   Lab Results   Component Value Date    A1C 7.3 07/17/2017    is not meeting goal of <7.0    Nutrition:  Patient has been following with a dietitian at [facility] for the last 3 years.  Has not been dropping weight like she has been hoping too.  Since dx she has made diet changes as well. Occasionally has a week of binging (sweets usually) but has not had a binge in 3 months.     Breakfast - 1 cup coffee with 1 tsp sugar and using almond milk creamer and breakfast sandwich made at home (thin whole grain bagel with kale or spinach and turkey barreto with egg) OR oatmeal (1 bowl)  Lunch - 5 whole grain crackers with salad with non-starchy vegetables and banana and cheese/salami OR salad with yogurt and boiled egg   Dinner - salad with italian dressing and skinless chicken breast OR zucchini with chicken breast OR 1/4 c brown rice with broccoli and ribs   Snacks - fruit or yogurt or almond butter biscuits    Beverages:water, unsweetened tea, coffee, no alcohol    Cultural/Voodoo diet restrictions: No     Biggest Challenge to Healthy Eating: timing of meals  "or sweets if she wants to binge    Physical Activity:    Walks 5-10 miles per day each day and on weekends it is more 8-12 miles per day.     Diabetes Risk Factors:  family history, age over 45 years, ethnicity, hyperlipidemia and overweight/obesity    Diabetes Complications:  Not discussed today.    Vitals:  There were no vitals taken for this visit.  Estimated body mass index is 35.07 kg/(m^2) as calculated from the following:    Height as of 8/14/17: 1.6 m (5' 3\").    Weight as of 8/14/17: 89.8 kg (198 lb).     Last 3 BP:   BP Readings from Last 3 Encounters:   08/14/17 113/85   07/17/17 127/76   01/13/17 131/82       History   Smoking Status     Never Smoker   Smokeless Tobacco     Never Used       Labs:  Lab Results   Component Value Date    A1C 7.3 07/17/2017     Lab Results   Component Value Date     07/17/2017     Lab Results   Component Value Date     07/17/2017     HDL Cholesterol   Date Value Ref Range Status   07/17/2017 51 >49 mg/dL Final   ]  GFR Estimate   Date Value Ref Range Status   07/17/2017 >90  Non  GFR Calc   >60 mL/min/1.7m2 Final     GFR Estimate If Black   Date Value Ref Range Status   07/17/2017 >90   GFR Calc   >60 mL/min/1.7m2 Final     Lab Results   Component Value Date    CR 0.65 07/17/2017     No results found for: MICROALBUMIN    Socio/Economic Considerations:    Support system: not assessed    Health Beliefs and Attitudes:   Patient Activation Measure Survey Score:  TRAVIS Score (Last Two) 11/9/2012 4/14/2014   TRAVIS Raw Score 44 43   Activation Score 70.8 68.5   TRAVIS Level 4 4       Stage of Change: ACTION (Actively working towards change)      Diabetes knowledge and skills assessment:     Patient is knowledgeable in diabetes management concepts related to: Healthy Eating and Being Active    Patient needs further education on the following diabetes management concepts: Healthy Eating, Being Active, Reducing Risks and Healthy " Coping    Barriers to Learning Assessment: No Barriers identified    Based on learning assessment above, most appropriate setting for further diabetes education would be: Group class or Individual setting.    INTERVENTION:   Education provided today on:  AADE Self-Care Behaviors:  Healthy Eating: carbohydrate counting, consistency in amount, composition, and timing of food intake and weight reduction. Focused on having some carb with each meal as some meals are a bit higher and some meals have very little or no carb.  Suggested fruit for example with dinner with her meat and non-starchy vegetables.  Praised her on all of her diet changes she has been making. Discussed sweets and portions for these so she can still include them and hopefully prevent or limit binging on them.   Being Active: relationship to blood glucose and encouraged her to continue her exercise.   Healthy Coping: recognize feelings about diagnosis and benefits of making appropriate lifestyle changes    Opportunities for ongoing education and support in diabetes-self management were discussed.    Pt verbalized understanding of concepts discussed and recommendations provided today.       Education Materials Provided:  [facility] Understanding Diabetes Booklet    PLAN:  See Patient Instructions for co-developed, patient-stated behavior change goals.  Exercise / activity plan: Recommend to continue walking each day like she is doing.  Continue to work on weight loss.  AVS printed and provided to patient today.    FOLLOW-UP:  Follow-up appointment scheduled on 9/28/17.  Education topics to cover at the next diabetes education visit(s): complication prevention, heart health, assess weight  Chart routed to referring provider.    Ongoing plan for education and support: Written resources (magazines, books, etc.), Follow-up visit with diabetes educator in 1 month and Follow-up with primary care provider    Meli Astorga RD LD CDE    Time Spent: 60  minutes  Encounter Type: Individual    Any diabetes medication dose changes were made via the CDE Protocol and Collaborative Practice Agreement with the patient's referring provider. A copy of this encounter was shared with the provider.

## 2019-11-13 ENCOUNTER — ANCILLARY PROCEDURE (OUTPATIENT)
Dept: MAMMOGRAPHY | Facility: CLINIC | Age: 54
End: 2019-11-13
Payer: COMMERCIAL

## 2019-11-13 DIAGNOSIS — Z12.31 VISIT FOR SCREENING MAMMOGRAM: ICD-10-CM

## 2019-11-13 PROCEDURE — 77063 BREAST TOMOSYNTHESIS BI: CPT | Mod: TC

## 2019-11-13 PROCEDURE — 77067 SCR MAMMO BI INCL CAD: CPT | Mod: TC

## 2020-01-14 ENCOUNTER — OFFICE VISIT (OUTPATIENT)
Dept: FAMILY MEDICINE | Facility: CLINIC | Age: 55
End: 2020-01-14
Payer: COMMERCIAL

## 2020-01-14 VITALS
OXYGEN SATURATION: 95 % | HEART RATE: 126 BPM | DIASTOLIC BLOOD PRESSURE: 60 MMHG | TEMPERATURE: 100.8 F | SYSTOLIC BLOOD PRESSURE: 112 MMHG | BODY MASS INDEX: 34.37 KG/M2 | WEIGHT: 194 LBS

## 2020-01-14 DIAGNOSIS — J06.9 VIRAL URI: Primary | ICD-10-CM

## 2020-01-14 DIAGNOSIS — E11.9 TYPE 2 DIABETES MELLITUS WITHOUT COMPLICATION, WITHOUT LONG-TERM CURRENT USE OF INSULIN (H): ICD-10-CM

## 2020-01-14 DIAGNOSIS — R50.9 FEVER, UNSPECIFIED FEVER CAUSE: ICD-10-CM

## 2020-01-14 DIAGNOSIS — R05.9 COUGH: ICD-10-CM

## 2020-01-14 DIAGNOSIS — R52 BODY ACHES: ICD-10-CM

## 2020-01-14 LAB
FLUAV+FLUBV AG SPEC QL: NEGATIVE
FLUAV+FLUBV AG SPEC QL: NEGATIVE
SPECIMEN SOURCE: NORMAL

## 2020-01-14 PROCEDURE — 99203 OFFICE O/P NEW LOW 30 MIN: CPT | Performed by: NURSE PRACTITIONER

## 2020-01-14 PROCEDURE — 87804 INFLUENZA ASSAY W/OPTIC: CPT | Performed by: NURSE PRACTITIONER

## 2020-01-14 RX ORDER — CODEINE PHOSPHATE AND GUAIFENESIN 10; 100 MG/5ML; MG/5ML
1-2 SOLUTION ORAL EVERY 4 HOURS PRN
Qty: 180 ML | Refills: 0 | Status: SHIPPED | OUTPATIENT
Start: 2020-01-14 | End: 2020-07-12

## 2020-01-14 RX ORDER — BENZONATATE 200 MG/1
200 CAPSULE ORAL 3 TIMES DAILY PRN
Qty: 18 CAPSULE | Refills: 1 | Status: SHIPPED | OUTPATIENT
Start: 2020-01-14 | End: 2022-03-18

## 2020-01-14 NOTE — PROGRESS NOTES
Subjective     Mary Franco is a 54 year old female who presents to clinic today for the following health issues:    Acute Illness   Acute illness concerns: fever, cough, sore throat  Onset: yesterday    Fever: YES    Chills/Sweats: YES    Headache (location?): no    Sinus Pressure:YES    Conjunctivitis:  no    Ear Pain: no    Rhinorrhea: no    Congestion: YES    Sore Throat: YES     Cough: YES    Wheeze: no    Decreased Appetite: YES    Nausea: no    Vomiting: no    Diarrhea:  no    Dysuria/Freq.: no    Fatigue/Achiness: YES    Sick/Strep Exposure: YES- grandkids     Therapies Tried and outcome: tyl, musinex    HPI: Mary presents today with the complaints of fever, chills, and body aches. These symptoms started yesterday and have worsened in intensity. She notes primarily fever, chills, body aches, sore throat, cough (dry), and fatigue. Her grandchildren were just diagnosed with influenza. OTC remedies not effective.     She does have type II diabetes for which she has had no follow up in over 2 years (diet controlled). She also has asthma, which may be slightly exacerbated within the context of this present acute illness.     Patient Active Problem List   Diagnosis     Subdural hematoma (H)     Mild intermittent asthma     History of cranioplasty     Obesity     Seasonal allergies     Type 2 diabetes mellitus without complication, without long-term current use of insulin (H)     Hyperlipidemia LDL goal <100     Iron deficiency anemia, unspecified iron deficiency anemia type     Past Surgical History:   Procedure Laterality Date     COLONOSCOPY N/A 8/14/2017    Procedure: COMBINED COLONOSCOPY, SINGLE OR MULTIPLE BIOPSY/POLYPECTOMY BY BIOPSY;;  Surgeon: Katja Paredes MD;  Location:  GI     CRANIOPLASTY Left 7/9/2015    Procedure: CRANIOPLASTY;  Surgeon: Yovani Willson MD;  Location:  OR     CRANIOTOMY Left 4/4/2015    Procedure: CRANIOTOMY;  Surgeon: Yovani Willson MD;  Location:  [facility] OR     CRANIOTOMY Left 2015    Procedure: CRANIOTOMY;  Surgeon: Yovani Willson MD;  Location: [facility] OR     CRANIOTOMY Left        Social History     Tobacco Use     Smoking status: Never Smoker     Smokeless tobacco: Never Used   Substance Use Topics     Alcohol use: No     Alcohol/week: 0.0 standard drinks     Family History   Problem Relation Age of Onset     Diabetes Father      Diabetes Maternal Grandmother         kindey failue     Heart Disease Maternal Grandmother         heart attack      Diabetes Maternal Grandfather      Hyperlipidemia Mother         ?High glucose           Reviewed and updated as needed this visit by Provider  Tobacco  Allergies  Meds  Problems  Med Hx  Surg Hx  Fam Hx         Review of Systems   ROS COMP: Constitutional, HEENT, pulmonary and endocrine systems are negative, except as otherwise noted.      Objective    /60   Pulse 126   Temp 100.8  F (38.2  C) (Tympanic)   Wt 88 kg (194 lb)   SpO2 95%   BMI 34.37 kg/m    Body mass index is 34.37 kg/m .  Physical Exam   GENERAL: alert and no distress  HENT: ear canals and TM's normal, nose and mouth without ulcers or lesions  NECK: no adenopathy, no asymmetry, masses, or scars and thyroid normal to palpation  RESP: Lungs clear to auscultation - no rales, rhonchi or wheezes; No findings to suggest focal / lobar consolidation; Chest excursion, respiratory rate, and ventilatory effort / ease within normal limits. No indicators of respiratory distress.   CV: regular rate and rhythm, normal S1 S2, no S3 or S4, no murmur, click or rub, no peripheral edema and peripheral pulses strong  NEURO: Normal strength and tone, mentation intact and speech normal    Diagnostic Test Results:  Results for orders placed or performed in visit on 20 (from the past 24 hour(s))   Influenza A/B antigen   Result Value Ref Range    Influenza A/B Agn Specimen Nasopharyngeal     Influenza A Negative NEG^Negative     Influenza B Negative NEG^Negative           Assessment & Plan     Mary was seen today for fever.    Diagnoses and all orders for this visit:    Viral URI  Comment: No red flags to prompt suspicion for pneumonia or other invasive process. Mildly febrile and tachycardic, but non-toxic in appearance and with adequate oxygenation today. Influenza screen negative. Likely an uncomplicated viral URI until proven otherwise. Will treat cough with Robitussin-AC and benzonatate with plan for symptom surveillance. Symptomatic cares (see patient instructions) and follow up precautions discussed in detail. Mary acknowledges and demonstrates understanding of circumstances under which care should be sought urgently or emergently. Follow up as discussed. Discussed risks, benefits, alternatives, potential side effects, and proper administration of new medication / treatment. Agrees with plan of care. All questions answered.     Fever, unspecified fever cause  -     Influenza A/B antigen    Body aches  -     Influenza A/B antigen    Cough  -     guaiFENesin-codeine (ROBITUSSIN AC) 100-10 MG/5ML solution; Take 5-10 mLs by mouth every 4 hours as needed for cough  -     benzonatate (TESSALON) 200 MG capsule; Take 1 capsule (200 mg) by mouth 3 times daily as needed for cough    Type 2 diabetes mellitus without complication, without long-term current use of insulin (H)  Comment: Mary was diagnosed with T2D a few years back and has had no follow up since that time. She agrees to make an appointment to assess her status and check labs in the near future. No immediate concerns at this time. She does relate that she has made many dietary modifications since diagnosis.         See Patient Instructions    Return in about 8 days (around 1/22/2020) for persistent or worsening symptoms.    Austin Pantoja NP  [facility] – [place]

## 2020-01-14 NOTE — PATIENT INSTRUCTIONS
VIRAL UPPER RESPIRATORY INFECTION SUPPORTIVE CARES:  Stay hydrated (even if you're not thirsty)  Over-the-counter decongestants (phenylephrine or Sudafed) for sinus and nasal congestion  Claritin  Warm compresses over sinuses, or allow warm shower water to run down face  Neti-Pot sinus / nasal rinse  Flonase or other intranasal steroid  Humidifier at night  Tylenol or ibuprofen for fever and aches & pains  Salt water gargles, Chloraseptic spray, or Cepacol lozenges for sore throat  Over-the-counter cough suppressants, cough drops, or honey for cough  Call or return to the clinic if you are getting worse or not getting better in the coming days  Go to the Emergency Room if you have trouble breathing or if your fever gets really high

## 2020-02-16 ENCOUNTER — HEALTH MAINTENANCE LETTER (OUTPATIENT)
Age: 55
End: 2020-02-16

## 2020-11-22 ENCOUNTER — HEALTH MAINTENANCE LETTER (OUTPATIENT)
Age: 55
End: 2020-11-22

## 2021-01-15 ENCOUNTER — HEALTH MAINTENANCE LETTER (OUTPATIENT)
Age: 56
End: 2021-01-15

## 2021-02-13 ENCOUNTER — HEALTH MAINTENANCE LETTER (OUTPATIENT)
Age: 56
End: 2021-02-13

## 2021-04-04 ENCOUNTER — HEALTH MAINTENANCE LETTER (OUTPATIENT)
Age: 56
End: 2021-04-04

## 2021-04-13 ENCOUNTER — IMMUNIZATION (OUTPATIENT)
Dept: NURSING | Facility: CLINIC | Age: 56
End: 2021-04-13
Payer: COMMERCIAL

## 2021-04-13 PROCEDURE — 91300 PR COVID VAC PFIZER DIL RECON 30 MCG/0.3 ML IM: CPT

## 2021-04-13 PROCEDURE — 0001A PR COVID VAC PFIZER DIL RECON 30 MCG/0.3 ML IM: CPT

## 2021-05-04 ENCOUNTER — IMMUNIZATION (OUTPATIENT)
Dept: NURSING | Facility: CLINIC | Age: 56
End: 2021-05-04
Attending: INTERNAL MEDICINE
Payer: COMMERCIAL

## 2021-05-04 PROCEDURE — 0002A PR COVID VAC PFIZER DIL RECON 30 MCG/0.3 ML IM: CPT

## 2021-05-04 PROCEDURE — 91300 PR COVID VAC PFIZER DIL RECON 30 MCG/0.3 ML IM: CPT

## 2021-05-30 ENCOUNTER — HEALTH MAINTENANCE LETTER (OUTPATIENT)
Age: 56
End: 2021-05-30

## 2021-09-19 ENCOUNTER — HEALTH MAINTENANCE LETTER (OUTPATIENT)
Age: 56
End: 2021-09-19

## 2022-01-09 ENCOUNTER — HEALTH MAINTENANCE LETTER (OUTPATIENT)
Age: 57
End: 2022-01-09

## 2022-03-06 ENCOUNTER — HEALTH MAINTENANCE LETTER (OUTPATIENT)
Age: 57
End: 2022-03-06

## 2022-03-18 ENCOUNTER — APPOINTMENT (OUTPATIENT)
Dept: CT IMAGING | Facility: CLINIC | Age: 57
DRG: 025 | End: 2022-03-18
Attending: NURSE PRACTITIONER
Payer: COMMERCIAL

## 2022-03-18 ENCOUNTER — OFFICE VISIT (OUTPATIENT)
Dept: URGENT CARE | Facility: URGENT CARE | Age: 57
End: 2022-03-18
Payer: COMMERCIAL

## 2022-03-18 ENCOUNTER — HOSPITAL ENCOUNTER (INPATIENT)
Facility: CLINIC | Age: 57
LOS: 4 days | Discharge: HOME | DRG: 025 | End: 2022-03-22
Attending: NURSE PRACTITIONER | Admitting: INTERNAL MEDICINE
Payer: COMMERCIAL

## 2022-03-18 VITALS
BODY MASS INDEX: 33.66 KG/M2 | DIASTOLIC BLOOD PRESSURE: 82 MMHG | HEART RATE: 97 BPM | OXYGEN SATURATION: 97 % | WEIGHT: 190 LBS | SYSTOLIC BLOOD PRESSURE: 124 MMHG | TEMPERATURE: 98 F

## 2022-03-18 DIAGNOSIS — E11.9 TYPE 2 DIABETES MELLITUS WITHOUT COMPLICATION, WITHOUT LONG-TERM CURRENT USE OF INSULIN (H): Primary | ICD-10-CM

## 2022-03-18 DIAGNOSIS — S06.5XAA SUBDURAL HEMATOMA (H): ICD-10-CM

## 2022-03-18 DIAGNOSIS — E78.5 HYPERLIPIDEMIA LDL GOAL <100: ICD-10-CM

## 2022-03-18 DIAGNOSIS — R51.9 NONINTRACTABLE HEADACHE, UNSPECIFIED CHRONICITY PATTERN, UNSPECIFIED HEADACHE TYPE: Primary | ICD-10-CM

## 2022-03-18 LAB
ABO/RH(D): NORMAL
ANION GAP SERPL CALCULATED.3IONS-SCNC: 4 MMOL/L (ref 3–14)
ANTIBODY SCREEN: NEGATIVE
APTT PPP: 27 SECONDS (ref 22–38)
ATRIAL RATE - MUSE: 83 BPM
BASOPHILS # BLD AUTO: 0 10E3/UL (ref 0–0.2)
BASOPHILS NFR BLD AUTO: 0 %
BUN SERPL-MCNC: 18 MG/DL (ref 7–30)
CALCIUM SERPL-MCNC: 9.4 MG/DL (ref 8.5–10.1)
CHLORIDE BLD-SCNC: 105 MMOL/L (ref 94–109)
CO2 SERPL-SCNC: 28 MMOL/L (ref 20–32)
CREAT SERPL-MCNC: 0.5 MG/DL (ref 0.52–1.04)
CRP SERPL-MCNC: 7 MG/L (ref 0–8)
DIASTOLIC BLOOD PRESSURE - MUSE: NORMAL MMHG
EOSINOPHIL # BLD AUTO: 0.1 10E3/UL (ref 0–0.7)
EOSINOPHIL NFR BLD AUTO: 2 %
ERYTHROCYTE [DISTWIDTH] IN BLOOD BY AUTOMATED COUNT: 15.7 % (ref 10–15)
ERYTHROCYTE [SEDIMENTATION RATE] IN BLOOD BY WESTERGREN METHOD: 22 MM/HR (ref 0–30)
GFR SERPL CREATININE-BSD FRML MDRD: >90 ML/MIN/1.73M2
GLUCOSE BLD-MCNC: 285 MG/DL (ref 70–99)
HCT VFR BLD AUTO: 39.7 % (ref 35–47)
HGB BLD-MCNC: 12.5 G/DL (ref 11.7–15.7)
IMM GRANULOCYTES # BLD: 0 10E3/UL
IMM GRANULOCYTES NFR BLD: 0 %
INR PPP: 0.97 (ref 0.85–1.15)
INTERPRETATION ECG - MUSE: NORMAL
LYMPHOCYTES # BLD AUTO: 2.6 10E3/UL (ref 0.8–5.3)
LYMPHOCYTES NFR BLD AUTO: 44 %
MCH RBC QN AUTO: 25.1 PG (ref 26.5–33)
MCHC RBC AUTO-ENTMCNC: 31.5 G/DL (ref 31.5–36.5)
MCV RBC AUTO: 80 FL (ref 78–100)
MONOCYTES # BLD AUTO: 0.3 10E3/UL (ref 0–1.3)
MONOCYTES NFR BLD AUTO: 5 %
NEUTROPHILS # BLD AUTO: 2.8 10E3/UL (ref 1.6–8.3)
NEUTROPHILS NFR BLD AUTO: 49 %
NRBC # BLD AUTO: 0 10E3/UL
NRBC BLD AUTO-RTO: 0 /100
P AXIS - MUSE: 33 DEGREES
PLATELET # BLD AUTO: 334 10E3/UL (ref 150–450)
POTASSIUM BLD-SCNC: 4 MMOL/L (ref 3.4–5.3)
PR INTERVAL - MUSE: 166 MS
QRS DURATION - MUSE: 88 MS
QT - MUSE: 394 MS
QTC - MUSE: 462 MS
R AXIS - MUSE: 27 DEGREES
RADIOLOGIST FLAGS: ABNORMAL
RBC # BLD AUTO: 4.99 10E6/UL (ref 3.8–5.2)
SARS-COV-2 RNA RESP QL NAA+PROBE: NEGATIVE
SODIUM SERPL-SCNC: 137 MMOL/L (ref 133–144)
SPECIMEN EXPIRATION DATE: NORMAL
SYSTOLIC BLOOD PRESSURE - MUSE: NORMAL MMHG
T AXIS - MUSE: 23 DEGREES
VENTRICULAR RATE- MUSE: 83 BPM
WBC # BLD AUTO: 5.9 10E3/UL (ref 4–11)

## 2022-03-18 PROCEDURE — U0005 INFEC AGEN DETEC AMPLI PROBE: HCPCS | Performed by: NURSE PRACTITIONER

## 2022-03-18 PROCEDURE — 86140 C-REACTIVE PROTEIN: CPT | Performed by: NURSE PRACTITIONER

## 2022-03-18 PROCEDURE — 120N000001 HC R&B MED SURG/OB

## 2022-03-18 PROCEDURE — 85730 THROMBOPLASTIN TIME PARTIAL: CPT | Performed by: NURSE PRACTITIONER

## 2022-03-18 PROCEDURE — 85610 PROTHROMBIN TIME: CPT | Performed by: NURSE PRACTITIONER

## 2022-03-18 PROCEDURE — 99223 1ST HOSP IP/OBS HIGH 75: CPT | Mod: AI | Performed by: INTERNAL MEDICINE

## 2022-03-18 PROCEDURE — 96374 THER/PROPH/DIAG INJ IV PUSH: CPT

## 2022-03-18 PROCEDURE — 70450 CT HEAD/BRAIN W/O DYE: CPT

## 2022-03-18 PROCEDURE — 99215 OFFICE O/P EST HI 40 MIN: CPT | Mod: 25

## 2022-03-18 PROCEDURE — 84132 ASSAY OF SERUM POTASSIUM: CPT | Performed by: NURSE PRACTITIONER

## 2022-03-18 PROCEDURE — 85025 COMPLETE CBC W/AUTO DIFF WBC: CPT | Performed by: NURSE PRACTITIONER

## 2022-03-18 PROCEDURE — 99285 EMERGENCY DEPT VISIT HI MDM: CPT | Mod: 25

## 2022-03-18 PROCEDURE — 250N000011 HC RX IP 250 OP 636: Performed by: NURSE PRACTITIONER

## 2022-03-18 PROCEDURE — 83036 HEMOGLOBIN GLYCOSYLATED A1C: CPT | Performed by: INTERNAL MEDICINE

## 2022-03-18 PROCEDURE — C9803 HOPD COVID-19 SPEC COLLECT: HCPCS

## 2022-03-18 PROCEDURE — 85652 RBC SED RATE AUTOMATED: CPT | Performed by: NURSE PRACTITIONER

## 2022-03-18 PROCEDURE — 99232 SBSQ HOSP IP/OBS MODERATE 35: CPT | Performed by: PHYSICIAN ASSISTANT

## 2022-03-18 PROCEDURE — 86850 RBC ANTIBODY SCREEN: CPT | Performed by: NURSE PRACTITIONER

## 2022-03-18 PROCEDURE — 36415 COLL VENOUS BLD VENIPUNCTURE: CPT | Performed by: NURSE PRACTITIONER

## 2022-03-18 PROCEDURE — 93005 ELECTROCARDIOGRAM TRACING: CPT

## 2022-03-18 RX ORDER — MORPHINE SULFATE 2 MG/ML
2 INJECTION, SOLUTION INTRAMUSCULAR; INTRAVENOUS ONCE
Status: COMPLETED | OUTPATIENT
Start: 2022-03-18 | End: 2022-03-18

## 2022-03-18 RX ORDER — ASPIRIN 81 MG/1
81 TABLET ORAL DAILY
Status: ON HOLD | COMMUNITY
End: 2022-03-22

## 2022-03-18 RX ORDER — MOMETASONE FUROATE MONOHYDRATE 50 UG/1
2 SPRAY, METERED NASAL DAILY
COMMUNITY

## 2022-03-18 RX ADMIN — MORPHINE SULFATE 2 MG: 2 INJECTION, SOLUTION INTRAMUSCULAR; INTRAVENOUS at 22:11

## 2022-03-18 ASSESSMENT — ENCOUNTER SYMPTOMS
CHEST TIGHTNESS: 0
NUMBNESS: 0
VOICE CHANGE: 0
NECK STIFFNESS: 0
NAUSEA: 0
DIZZINESS: 0
TREMORS: 0
SEIZURES: 0
NECK PAIN: 0
FACIAL ASYMMETRY: 0
SHORTNESS OF BREATH: 0
WEAKNESS: 0
RHINORRHEA: 0
ARTHRALGIAS: 0
ABDOMINAL PAIN: 0
SORE THROAT: 0
SPEECH DIFFICULTY: 0
FATIGUE: 0
CHILLS: 0
HEADACHES: 1
CONFUSION: 0
PHOTOPHOBIA: 0
AGITATION: 0
FEVER: 0
MYALGIAS: 0
LIGHT-HEADEDNESS: 0
TROUBLE SWALLOWING: 0

## 2022-03-18 ASSESSMENT — ACTIVITIES OF DAILY LIVING (ADL): ADLS_ACUITY_SCORE: 10

## 2022-03-18 NOTE — PROGRESS NOTES
Assessment & Plan     Nonintractable headache, unspecified chronicity pattern, unspecified headache type  Sent to ER where CT head scan showed right temporal subdural hematoma. Admitted to neuro ICU.       JUDSON Meza Urgent Care Provider  [facility] MICHELL Hernandez is a 56 year old female who presents to clinic today for the following health issues:  Chief Complaint   Patient presents with     Urgent Care     Headache     headache x1week, worst when she sits up or reaches for something, center and temple area of head. has had surgeries for subdermal hematoma in  2015.     HPI    Headache since last Friday. Did the decongestant and inhaler because she thought it was a sinus thing. Worse with changes in position. Headache is below the temple mostly on the left.   Denies fevers. Not still congested at all. Denies injury. No memory loss. Denies changes in her vision. No blurry vision or nausea. No numbness or tingling. Denies difficulty walking or changes in her vision.   When she leans forward she feels pain but denies changes in her balance.   She states her left ear is starting to hurt.   Sneezing and blowing nose hurts.   She is alternating between tylenol and aleve.     She hasn't had a headache since 2015 for subdural hematoma.    She drinks caffeine every day.     Review of Systems  Constitutional, HEENT, cardiovascular, pulmonary, gi and gu systems are negative, except as otherwise noted.      Objective    /82   Pulse 97   Temp 98  F (36.7  C) (Oral)   Wt 86.2 kg (190 lb)   SpO2 97%   BMI 33.66 kg/m    Physical Exam   GENERAL: healthy, alert and no distress  Craniotomy scar left temporal scalp  NECK: no adenopathy, no asymmetry  RESP: lungs clear to auscultation - no rales, rhonchi or wheezes  CV: regular rate and rhythm, normal S1 S2, no S3 or S4, no murmur  MS: no gross musculoskeletal defects noted, no edema  NEURO: Normal strength and tone, sensory  exam grossly normal, mentation intact, speech normal, cranial nerves 2-12 intact and gait normal including heel/toe/tandem walking, no pronator drift.    No results found for this or any previous visit (from the past 24 hour(s)).

## 2022-03-19 ENCOUNTER — APPOINTMENT (OUTPATIENT)
Dept: CT IMAGING | Facility: CLINIC | Age: 57
DRG: 025 | End: 2022-03-19
Attending: INTERNAL MEDICINE
Payer: COMMERCIAL

## 2022-03-19 ENCOUNTER — PREP FOR PROCEDURE (OUTPATIENT)
Dept: NEUROLOGY | Facility: CLINIC | Age: 57
End: 2022-03-19

## 2022-03-19 ENCOUNTER — APPOINTMENT (OUTPATIENT)
Dept: OCCUPATIONAL THERAPY | Facility: CLINIC | Age: 57
DRG: 025 | End: 2022-03-19
Attending: INTERNAL MEDICINE
Payer: COMMERCIAL

## 2022-03-19 ENCOUNTER — ANESTHESIA EVENT (OUTPATIENT)
Dept: SURGERY | Facility: CLINIC | Age: 57
DRG: 025 | End: 2022-03-19
Payer: COMMERCIAL

## 2022-03-19 DIAGNOSIS — I62.03 CHRONIC SUBDURAL HEMATOMA (H): Primary | ICD-10-CM

## 2022-03-19 PROBLEM — E11.9 TYPE 2 DIABETES MELLITUS WITHOUT COMPLICATION, WITHOUT LONG-TERM CURRENT USE OF INSULIN (H): Status: ACTIVE | Noted: 2017-07-20

## 2022-03-19 LAB
GLUCOSE BLDC GLUCOMTR-MCNC: 199 MG/DL (ref 70–99)
GLUCOSE BLDC GLUCOMTR-MCNC: 204 MG/DL (ref 70–99)
GLUCOSE BLDC GLUCOMTR-MCNC: 207 MG/DL (ref 70–99)
GLUCOSE BLDC GLUCOMTR-MCNC: 211 MG/DL (ref 70–99)
GLUCOSE BLDC GLUCOMTR-MCNC: 216 MG/DL (ref 70–99)
GLUCOSE BLDC GLUCOMTR-MCNC: 296 MG/DL (ref 70–99)
HBA1C MFR BLD: 12.5 % (ref 0–5.6)

## 2022-03-19 PROCEDURE — 258N000003 HC RX IP 258 OP 636: Performed by: INTERNAL MEDICINE

## 2022-03-19 PROCEDURE — 97165 OT EVAL LOW COMPLEX 30 MIN: CPT | Mod: GO

## 2022-03-19 PROCEDURE — 250N000011 HC RX IP 250 OP 636: Performed by: INTERNAL MEDICINE

## 2022-03-19 PROCEDURE — 99231 SBSQ HOSP IP/OBS SF/LOW 25: CPT | Performed by: PHYSICIAN ASSISTANT

## 2022-03-19 PROCEDURE — 70450 CT HEAD/BRAIN W/O DYE: CPT

## 2022-03-19 PROCEDURE — 99233 SBSQ HOSP IP/OBS HIGH 50: CPT | Performed by: INTERNAL MEDICINE

## 2022-03-19 PROCEDURE — 250N000012 HC RX MED GY IP 250 OP 636 PS 637: Performed by: INTERNAL MEDICINE

## 2022-03-19 PROCEDURE — 250N000013 HC RX MED GY IP 250 OP 250 PS 637: Performed by: HOSPITALIST

## 2022-03-19 PROCEDURE — 120N000001 HC R&B MED SURG/OB

## 2022-03-19 RX ORDER — DEXTROSE MONOHYDRATE 25 G/50ML
25-50 INJECTION, SOLUTION INTRAVENOUS
Status: DISCONTINUED | OUTPATIENT
Start: 2022-03-19 | End: 2022-03-22 | Stop reason: HOSPADM

## 2022-03-19 RX ORDER — SODIUM CHLORIDE 9 MG/ML
INJECTION, SOLUTION INTRAVENOUS CONTINUOUS
Status: DISCONTINUED | OUTPATIENT
Start: 2022-03-19 | End: 2022-03-20

## 2022-03-19 RX ORDER — NICOTINE POLACRILEX 4 MG
15-30 LOZENGE BUCCAL
Status: DISCONTINUED | OUTPATIENT
Start: 2022-03-19 | End: 2022-03-22 | Stop reason: HOSPADM

## 2022-03-19 RX ORDER — HYDRALAZINE HYDROCHLORIDE 20 MG/ML
10-20 INJECTION INTRAMUSCULAR; INTRAVENOUS
Status: DISCONTINUED | OUTPATIENT
Start: 2022-03-19 | End: 2022-03-22 | Stop reason: HOSPADM

## 2022-03-19 RX ORDER — BISACODYL 10 MG
10 SUPPOSITORY, RECTAL RECTAL DAILY PRN
Status: DISCONTINUED | OUTPATIENT
Start: 2022-03-19 | End: 2022-03-22 | Stop reason: HOSPADM

## 2022-03-19 RX ORDER — AMOXICILLIN 250 MG
2 CAPSULE ORAL 2 TIMES DAILY PRN
Status: DISCONTINUED | OUTPATIENT
Start: 2022-03-19 | End: 2022-03-22 | Stop reason: HOSPADM

## 2022-03-19 RX ORDER — LIDOCAINE 40 MG/G
CREAM TOPICAL
Status: DISCONTINUED | OUTPATIENT
Start: 2022-03-19 | End: 2022-03-19

## 2022-03-19 RX ORDER — ONDANSETRON 2 MG/ML
4 INJECTION INTRAMUSCULAR; INTRAVENOUS EVERY 6 HOURS PRN
Status: DISCONTINUED | OUTPATIENT
Start: 2022-03-19 | End: 2022-03-20

## 2022-03-19 RX ORDER — POLYETHYLENE GLYCOL 3350 17 G/17G
17 POWDER, FOR SOLUTION ORAL DAILY PRN
Status: DISCONTINUED | OUTPATIENT
Start: 2022-03-19 | End: 2022-03-22 | Stop reason: HOSPADM

## 2022-03-19 RX ORDER — HYDROMORPHONE HYDROCHLORIDE 1 MG/ML
0.3 INJECTION, SOLUTION INTRAMUSCULAR; INTRAVENOUS; SUBCUTANEOUS
Status: DISCONTINUED | OUTPATIENT
Start: 2022-03-19 | End: 2022-03-22 | Stop reason: HOSPADM

## 2022-03-19 RX ORDER — NALOXONE HYDROCHLORIDE 0.4 MG/ML
0.4 INJECTION, SOLUTION INTRAMUSCULAR; INTRAVENOUS; SUBCUTANEOUS
Status: DISCONTINUED | OUTPATIENT
Start: 2022-03-19 | End: 2022-03-22 | Stop reason: HOSPADM

## 2022-03-19 RX ORDER — ACETAMINOPHEN 325 MG/1
650 TABLET ORAL EVERY 6 HOURS PRN
Status: DISCONTINUED | OUTPATIENT
Start: 2022-03-19 | End: 2022-03-22 | Stop reason: HOSPADM

## 2022-03-19 RX ORDER — LABETALOL HYDROCHLORIDE 5 MG/ML
10-40 INJECTION, SOLUTION INTRAVENOUS EVERY 10 MIN PRN
Status: DISCONTINUED | OUTPATIENT
Start: 2022-03-19 | End: 2022-03-22 | Stop reason: HOSPADM

## 2022-03-19 RX ORDER — NALOXONE HYDROCHLORIDE 0.4 MG/ML
0.2 INJECTION, SOLUTION INTRAMUSCULAR; INTRAVENOUS; SUBCUTANEOUS
Status: DISCONTINUED | OUTPATIENT
Start: 2022-03-19 | End: 2022-03-22 | Stop reason: HOSPADM

## 2022-03-19 RX ORDER — AMOXICILLIN 250 MG
1 CAPSULE ORAL 2 TIMES DAILY PRN
Status: DISCONTINUED | OUTPATIENT
Start: 2022-03-19 | End: 2022-03-22 | Stop reason: HOSPADM

## 2022-03-19 RX ORDER — PROCHLORPERAZINE 25 MG
25 SUPPOSITORY, RECTAL RECTAL EVERY 12 HOURS PRN
Status: DISCONTINUED | OUTPATIENT
Start: 2022-03-19 | End: 2022-03-22 | Stop reason: HOSPADM

## 2022-03-19 RX ORDER — CETIRIZINE HYDROCHLORIDE 5 MG/1
5 TABLET ORAL DAILY
Status: DISCONTINUED | OUTPATIENT
Start: 2022-03-19 | End: 2022-03-20 | Stop reason: DRUGHIGH

## 2022-03-19 RX ORDER — NITROGLYCERIN 0.4 MG/1
0.4 TABLET SUBLINGUAL EVERY 5 MIN PRN
Status: DISCONTINUED | OUTPATIENT
Start: 2022-03-19 | End: 2022-03-22 | Stop reason: HOSPADM

## 2022-03-19 RX ORDER — LIDOCAINE 40 MG/G
CREAM TOPICAL
Status: DISCONTINUED | OUTPATIENT
Start: 2022-03-19 | End: 2022-03-20

## 2022-03-19 RX ORDER — DEXTROSE MONOHYDRATE 25 G/50ML
25-50 INJECTION, SOLUTION INTRAVENOUS
Status: DISCONTINUED | OUTPATIENT
Start: 2022-03-19 | End: 2022-03-19

## 2022-03-19 RX ORDER — PROCHLORPERAZINE MALEATE 10 MG
10 TABLET ORAL EVERY 6 HOURS PRN
Status: DISCONTINUED | OUTPATIENT
Start: 2022-03-19 | End: 2022-03-20

## 2022-03-19 RX ORDER — MORPHINE SULFATE 4 MG/ML
1 INJECTION, SOLUTION INTRAMUSCULAR; INTRAVENOUS
Status: DISCONTINUED | OUTPATIENT
Start: 2022-03-19 | End: 2022-03-19

## 2022-03-19 RX ORDER — ONDANSETRON 4 MG/1
4 TABLET, ORALLY DISINTEGRATING ORAL EVERY 6 HOURS PRN
Status: DISCONTINUED | OUTPATIENT
Start: 2022-03-19 | End: 2022-03-20

## 2022-03-19 RX ORDER — NICOTINE POLACRILEX 4 MG
15-30 LOZENGE BUCCAL
Status: DISCONTINUED | OUTPATIENT
Start: 2022-03-19 | End: 2022-03-19

## 2022-03-19 RX ADMIN — INSULIN ASPART 2 UNITS: 100 INJECTION, SOLUTION INTRAVENOUS; SUBCUTANEOUS at 06:24

## 2022-03-19 RX ADMIN — INSULIN GLARGINE 10 UNITS: 100 INJECTION, SOLUTION SUBCUTANEOUS at 09:20

## 2022-03-19 RX ADMIN — SODIUM CHLORIDE, PRESERVATIVE FREE: 5 INJECTION INTRAVENOUS at 21:49

## 2022-03-19 RX ADMIN — ACETAMINOPHEN 650 MG: 325 TABLET, FILM COATED ORAL at 04:13

## 2022-03-19 RX ADMIN — SODIUM CHLORIDE, PRESERVATIVE FREE: 5 INJECTION INTRAVENOUS at 01:12

## 2022-03-19 RX ADMIN — SODIUM CHLORIDE, PRESERVATIVE FREE: 5 INJECTION INTRAVENOUS at 11:26

## 2022-03-19 RX ADMIN — MORPHINE SULFATE 1 MG: 4 INJECTION, SOLUTION INTRAMUSCULAR; INTRAVENOUS at 02:54

## 2022-03-19 RX ADMIN — MORPHINE SULFATE 1 MG: 4 INJECTION, SOLUTION INTRAMUSCULAR; INTRAVENOUS at 09:00

## 2022-03-19 RX ADMIN — HYDROMORPHONE HYDROCHLORIDE 0.3 MG: 1 INJECTION, SOLUTION INTRAMUSCULAR; INTRAVENOUS; SUBCUTANEOUS at 23:51

## 2022-03-19 RX ADMIN — ACETAMINOPHEN 650 MG: 325 TABLET, FILM COATED ORAL at 20:32

## 2022-03-19 RX ADMIN — INSULIN ASPART 4 UNITS: 100 INJECTION, SOLUTION INTRAVENOUS; SUBCUTANEOUS at 03:35

## 2022-03-19 RX ADMIN — HYDROMORPHONE HYDROCHLORIDE 0.3 MG: 1 INJECTION, SOLUTION INTRAMUSCULAR; INTRAVENOUS; SUBCUTANEOUS at 12:24

## 2022-03-19 RX ADMIN — HYDROMORPHONE HYDROCHLORIDE 0.3 MG: 1 INJECTION, SOLUTION INTRAMUSCULAR; INTRAVENOUS; SUBCUTANEOUS at 20:32

## 2022-03-19 RX ADMIN — ACETAMINOPHEN 650 MG: 325 TABLET, FILM COATED ORAL at 12:24

## 2022-03-19 RX ADMIN — INSULIN ASPART 2 UNITS: 100 INJECTION, SOLUTION INTRAVENOUS; SUBCUTANEOUS at 11:19

## 2022-03-19 ASSESSMENT — ACTIVITIES OF DAILY LIVING (ADL)
ADLS_ACUITY_SCORE: 10
PREVIOUS_RESPONSIBILITIES: MEAL PREP;HOUSEKEEPING;LAUNDRY;SHOPPING;MEDICATION MANAGEMENT;FINANCES;DRIVING;YARDWORK;WORK
ADLS_ACUITY_SCORE: 10

## 2022-03-19 NOTE — PLAN OF CARE
Occupational Therapy Discharge Summary    Reason for therapy discharge:    Discharged to home.    Progress towards therapy goal(s). See goals on Care Plan in [facility] electronic health record for goal details.  Goals met    Therapy recommendation(s):    No further therapy is recommended. Pt functioning near baseline w/ headache pain limiting her. Currently (I) in functional mobility and ADLs. No cognitive concerns. Pending recovery after drain is placed for her subdural hematoma, anticipate pt can return home safely.

## 2022-03-19 NOTE — PROVIDER NOTIFICATION
"MD Notification    Notified Person: MD    Notified Person Name: Ranjeet Araujo    Notification Date/Time: 3/19/22 at 1357    Notification Interaction: amcom    Purpose of Notification: \" - MW - Can you please let us know if you want patient to remain q2h neuros until surgery tomorrow or if okay for q4h moving forward? Thank you! Alma XIE *[phone]\"    Repaged above at 1551    Orders: Okay for q4h neuros/vital signs moving forward  "

## 2022-03-19 NOTE — H&P
"[facility]    History and Physical - Hospitalist Service       Date of Admission:  3/18/2022    Assessment & Plan      Mary Franco is a 56 year old female admitted on 3/18/2022. She presents to the emergency department for evaluation of persistent worsening headache similar to prior spontaneous subdural hematoma in 2015 and is found to have again a spontaneous subdural hematoma, this time right frontal parietal with 8 mm leftward subfalcine shift and early uncal herniation.    Spontaneous right frontal subdural hematoma: 8 mm of leftward subfalcine shift with early uncal herniation and concern for left lateral ventricular entrapment.  Primary symptoms are headache, worse when laying down or immediately upon waking up in the morning.  No other focal neurologic deficits have been appreciated.  Patient does have some speech thickening when she is tired and occasionally some word substitution when she is \"frazzled\" which are residual from her 2015 subdural.  -Neurosurgery consulted, aware patient in the emergency department  -Neurocritical care consulted given recurrent spontaneous subdural hematomas; patient might benefit from angiography going forward.  Appreciate recommendations  -Every 2 hours neuro checks; okay for neuro floor IMC status per neurosurgery team  -81 mg aspirin daily has been held.  Discussed with patient as well as son at bedside on admission my concern for restarting this.  Patient takes this medication for primary prophylaxis in the setting of type 2 diabetes, though now has had 2 spontaneous subdural hematomas.  -Elevate head of bed  -Systolic blood pressure less than 150  -Repeat head CT time for 6 AM  -Outpatient ophthalmology follow-up    Type 2 diabetes: Currently with hyperglycemia, glucose of 285 on arrival.  Reports she is no longer on medications for diabetes, managed with diet and exercise  -Hemoglobin A1c pending  -Continue with diet and exercise; patient " "reports that she has lost approximately 18 pounds with diet and exercise  -Medium dose sliding scale insulin every 4 hours while n.p.o.; low threshold for initiation of low-dose basal insulin or even insulin drip if blood glucose remains significantly elevated  -Addendum: Hemoglobin A1c greater than 10.  Initiating Lantus 10 units every morning.           Diet:   N.p.o. at midnight  DVT Prophylaxis: Pneumatic compression devices  Nichole Catheter: Not present  Central Lines: None  Cardiac Monitoring: None  Code Status:    Full code.  Confirmed with patient on admission    Clinically Significant Risk Factors Present on Admission               # Platelet Defect: home medication list includes an antiplatelet medication   # Obesity: Estimated body mass index is 33.66 kg/m  as calculated from the following:    Height as of this encounter: 1.6 m (5' 3\").    Weight as of this encounter: 86.2 kg (190 lb).      Disposition Plan   Expected Discharge:  unclear disposition timing.  Await neurosurgery and neuro critical care evaluation.  If patient requires repeat craniotomy, anticipate 4 to 5-day hospitalization  Anticipated discharge location:  Awaiting care coordination huddle  Delays:           The patient's care was discussed with the Patient and JR Lundberg in ER.  Discussed also with patient's son at bedside.    Mynor De La Vega MD  Hospitalist Service  [facility]  Securely message with the Vocera Web Console (learn more here)  Text page via [employer] Paging/Directory         ______________________________________________________________________    Chief Complaint   Headache    History is obtained from the patient, chart review, patient's son at bedside, discussion with ER provider.    History of Present Illness   Mary Franco is a 56 year old female who presents to the emergency department for evaluation of 1 week of persistent headache, worsening, and reminiscent of prior subdural hematoma.  " She is found to have a new right frontal subdural hematoma with 8 mm left subfalcine shift and early uncal herniation as well as concern for early left lateral ventricular entrapment.    For approximately 1 week, patient has had a headache.  She reports it was essentially frontal, slightly more to the right than the left temple, though has been expanding over the past week.  She cannot recall exactly how her headache started, whether this was acute onset during the day or she awoke with it after sleeping, for instance.  Regardless, patient felt that her headache was initially secondary to a sinus infection.  She had sinus congestion around the same time as headache started.  Describes initially taking some Sudafed to help with this, later some Aleve at night to help her with pain and sleeping.  Is on 81 mg aspirin daily.  She tells me that her typical blood pressure is in the 120 systolic range and she does not have issues with hypertension.  She does not believe that Sudafed causes drastic swings in her blood pressure.  After 3 or 4 days, her sinus symptoms improved, though her headache persisted, and began to worsen.  It was at this point that patient became concerned that she might have recurrent subdural and presented to the emergency department where subdural was confirmed.    Patient with a spontaneous left-sided subdural in the setting of 2 weeks headache, some increased sleepiness, ataxia, and double vision as well as a syncopal episode resulted in diagnosis of subdural in 2015 requiring craniotomy, subsequent redo when she developed aphasia during hospitalization.  She has never undergone cerebral angiography.  I actually followed patient for several days during her hospitalization at that time.    Patient has no focal neurologic complaints or symptoms.    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  He is at bedside, and  "recognizes no change in mother speech, coordination, appearance, or otherwise.  In talking with me, patient does have some very slight speech change which I would describe as \"thickened.\"  Patient reports that she is tired, did receive some morphine for her headache in the emergency department prior to my assessment, and actually tells me that this change in her speech is typical and baseline for her since 2015 subdural hematoma.  Similarly, she typically does not have word finding difficulty, though tells me that when she is tired or frazzled, she will occasionally have word substitution when she is writing.  No word finding difficulty or substitution currently.        Review of Systems    The 10 point Review of Systems is negative other than noted in the HPI or here.  No fevers or chills  No nausea, vomiting, or diarrhea    Past Medical History    I have reviewed this patient's medical history and updated it with pertinent information if needed.   Past Medical History:   Diagnosis Date     Elevated blood sugar 2015     Obesity      Seasonal allergies      Subdural haemorrhage 2015    Large left-sided     Uncomplicated asthma        Past Surgical History   I have reviewed this patient's surgical history and updated it with pertinent information if needed.  Past Surgical History:   Procedure Laterality Date     COLONOSCOPY N/A 8/14/2017    Procedure: COMBINED COLONOSCOPY, SINGLE OR MULTIPLE BIOPSY/POLYPECTOMY BY BIOPSY;;  Surgeon: Katja Paredes MD;  Location:  GI     CRANIOPLASTY Left 7/9/2015    Procedure: CRANIOPLASTY;  Surgeon: Yovani Willson MD;  Location:  OR     CRANIOTOMY Left 4/4/2015    Procedure: CRANIOTOMY;  Surgeon: Yovani Willson MD;  Location:  OR     CRANIOTOMY Left 4/9/2015    Procedure: CRANIOTOMY;  Surgeon: Yovani Willson MD;  Location: [facility] OR     CRANIOTOMY Left 2015       Social History   I have reviewed this patient's social history and updated it " with pertinent information if needed.  Social History     Tobacco Use     Smoking status: Never Smoker     Smokeless tobacco: Never Used   Substance Use Topics     Alcohol use: No     Alcohol/week: 0.0 standard drinks     Drug use: No       Family History   I have reviewed this patient's family history and updated it with pertinent information if needed.  Family History   Problem Relation Age of Onset     Diabetes Father      Diabetes Maternal Grandmother         kindey failue     Heart Disease Maternal Grandmother         heart attack      Diabetes Maternal Grandfather      Hyperlipidemia Mother         ?High glucose   Maternal grandmother was on dialysis, though patient reports no family history of polycystic kidney disease to her knowledge.  No family history of cerebral aneurysms    Prior to Admission Medications   Prior to Admission Medications   Prescriptions Last Dose Informant Patient Reported? Taking?   APPLE CIDER VINEGAR PO 3/17/2022 at Unknown time  Yes Yes   Sig: Take by mouth daily   Omega-3 Fatty Acids (OMEGA-3 FISH OIL PO) 3/17/2022 at Unknown time Self Yes Yes   Sig: Take 1 g by mouth daily   VITAMIN E PO 3/17/2022 at Unknown time  Yes Yes   Sig: Take 1 tablet by mouth daily   aspirin 81 MG EC tablet 3/17/2022 at Unknown time  Yes Yes   Sig: Take 81 mg by mouth daily   calcium-magnesium (CALMAG) 500-250 MG TABS 3/17/2022 at Unknown time Self Yes Yes   Sig: Take 1 tablet by mouth daily   cetirizine (ZYRTEC ALLERGY) 10 MG tablet 3/18/2022 at Unknown time  No Yes   Sig: Take 1 tablet (10 mg) by mouth daily   mometasone (NASONEX) 50 MCG/ACT nasal spray 3/17/2022 at Unknown time  Yes Yes   Sig: Spray 2 sprays into both nostrils daily   multivitamin, therapeutic (THERA-VIT) TABS 3/17/2022 at Unknown time Self Yes Yes   Sig: Take 1 tablet by mouth daily      Facility-Administered Medications: None     Allergies   Allergies   Allergen Reactions     Penicillins Shortness Of Breath, Hives and Difficulty  "breathing     Vicodin [Hydrocodone-Acetaminophen] Other (See Comments)     Severe headache         Physical Exam   Vital Signs: Temp: 97.8  F (36.6  C) Temp src: Oral BP: 138/87 Pulse: 88   Resp: 16 SpO2: 97 % O2 Device: None (Room air)    Weight: 190 lbs 0 oz    General Appearance: Generally comfortable and well-appearing 56-year-old female reclining on [facility].  She is in no acute distress and does not appear toxic  Eyes: No scleral icterus or injection  HEENT: Normocephalic and atraumatic.  Respiratory: Breath sounds are clear bilateral to auscultation, no wheezes, no crackles  Cardiovascular: Regular rate and rhythm.  No appreciable murmur.  Heart sounds are somewhat distant.  GI: Abdomen soft, nontender palpation.  No palpable mass.  Lymph/Hematologic: No lower extremity edema  Genitourinary: Not examined  Skin: No rashes, no petechia, no jaundice  Musculoskeletal: Muscular tone and bulk intact in all extremities.  Appropriate for age.  Neurologic: Alert, conversant, appropriate in conversation.  Mental status is grossly intact.  No pronator drift, rapid repeated motions of distal upper extremities bilaterally equal.   strength, wrist flexion and extension, elbow flexion extension intact and 5/5 bilaterally.  Patient with worsened headache when flexing at the hip against resistance on left, 5/5 on right.  No clonus.  Babinski response reduced bilaterally, though equal.  Patient seems to have some \"thickened\" speech without word finding difficulty or substitution.  She tells me this is normal for her when she is tired since 2015 subdural.  Heel shin and finger-nose-finger intact bilaterally.  Cranial nerves intact.  Psychiatric: Very pleasant, normal affect    Data   Data reviewed today: I reviewed all medications, new labs and imaging results over the last 24 hours. I personally reviewed the head CT image(s) showing R frontoparietal SDH w/ midline shift towards L, compression of R " ventricles.    Recent Labs   Lab 03/18/22 2205 03/18/22 2020 03/18/22 2017   WBC  --  5.9  --    HGB  --  12.5  --    MCV  --  80  --    PLT  --  334  --    INR 0.97  --   --    NA  --   --  137   POTASSIUM  --   --  4.0   CHLORIDE  --   --  105   CO2  --   --  28   BUN  --   --  18   CR  --   --  0.50*   ANIONGAP  --   --  4   WINDY  --   --  9.4   GLC  --   --  285*

## 2022-03-19 NOTE — PLAN OF CARE
Pt here with R sided acute SDH with midline shift.  A&O.x4 . CMS intact. VSS. NS infusing at 100 ml/hr. Tele NSR. NPO except for meds. Takes pills whole. IND in room. Morphine x1 for headache and Tylenol x1 for headache. Keep HOB 30-45 degrees. Pt scoring green on the Aggression Stop Light Tool. Discharge pending.

## 2022-03-19 NOTE — PROVIDER NOTIFICATION
"Notified neurosurg PA Ranjeet Araujo \"710: Do you want pt to continue being NPO? Also can pt get anything else for pain other than morphine and Tylenol? Thank you! ROJAS Montalvo\"  "

## 2022-03-19 NOTE — ED NOTES
"[facility]  ED Nurse Handoff Report    ED Chief complaint: Headache      ED Diagnosis:   Final diagnoses:   Subdural hematoma (H)       Code Status: Full Code    Allergies:   Allergies   Allergen Reactions     Penicillins Shortness Of Breath, Hives and Difficulty breathing     Vicodin [Hydrocodone-Acetaminophen] Other (See Comments)     Severe headache         Patient Story: Headache x1 week, atraumatic   Focused Assessment:  Pt has no neuro deficits, ambulated into ED with HA pain.  CT shows R sided acute subdural with midline shift and early uncal herniation.  VSS.     Treatments and/or interventions provided: monitoring  Patient's response to treatments and/or interventions:     To be done/followed up on inpatient unit:  na    Does this patient have any cognitive concerns?: na    Activity level - Baseline/Home:  Independent  Activity Level - Current:   Independent    Patient's Preferred language: English   Needed?: No    Isolation: None  Infection: Not Applicable  Patient tested for COVID 19 prior to admission: YES  Bariatric?: No    Vital Signs:   Vitals:    03/18/22 1931 03/18/22 2121 03/18/22 2122 03/18/22 2145   BP: (!) 156/86 136/87  138/87   Pulse: 95 88     Resp: 16  16    Temp: 97.8  F (36.6  C)      TempSrc: Oral      SpO2: 98%  98% 97%   Weight: 86.2 kg (190 lb)      Height: 1.6 m (5' 3\")          Cardiac Rhythm:     Was the PSS-3 completed:   Yes  What interventions are required if any?               Family Comments: son at bedside  OBS brochure/video discussed/provided to patient/family: N/A              Name of person given brochure if not patient:               Relationship to patient:     For the majority of the shift this patient's behavior was Green.   Behavioral interventions performed were .    ED NURSE PHONE NUMBER: *[phone]         "

## 2022-03-19 NOTE — ED PROVIDER NOTES
Emergency Department Attending Supervision Note  3/18/2022  9:53 PM      I evaluated this patient in conjunction with Cyndee Cantu, CNP       On my exam, Awake, alert, and appropriate. Fluent speech. Normal motor sensation, coordination, finger and nose finger. Heart, lung, and abdomen exam are also normal.     Results:    ED course:    ECG:  ECG taken at 2151, ECG read at 2151  Normal sinus rhythm   Normal EKG    Rate 83 bpm. VT interval 166 ms. QRS duration 88 ms. QT/QTc 394/462 ms. P-R-T axes 33 27 23.     Mary BENJAMIN Franco is a 56 year old female seen by Cyndee, who has asked for consultation when she was found to have subdural hematoma. The patient had similar subdural hematoma on the left side 7 years ago and required left frontal temporale parietal craniotomy. She states she has been having headache for at least a week and went into the clinic, got a CT scan which now shows a right sided subdural. There is concern by radiologist that there is some unsinatriniation     going on. The patient however is awake and alert and talkative and looks great from a physical exam. Neurologically she is awake, alert, appropriate. She has fluent speech. Normal motor sensation, coordination, finger and nose finger. Heart, lung, and abdomen exam are also normal. Patient was brought to the stabilization area where basic bloods were obtained, and ECG was obtained and Cyndee discussed with neuro surgery the case. Joshua Mckeon, the PA from neurosurgery, came and saw the patient. Initially they were going to admit her to the ICU for further monitoring, but at this point she will be going to neurology. There is most likely not acute herniation going on. Patient will have her blood pressure kept below 140 or 150 per neurosurgery, and be further evaluated more in the morning if no acute changes occurs.         Diagnosis    ICD-10-CM    1. Subdural hematoma (H)  S06.5X9A          Tunde Peralta MD Steinman, Randall Ira,  MD  03/19/22 0127

## 2022-03-19 NOTE — PHARMACY-ADMISSION MEDICATION HISTORY
Pharmacy Medication History  Admission medication history interview status for the 3/18/2022  admission is complete. See EPIC admission navigator for prior to admission medications     Location of Interview: Patient room  Medication history sources: Patient    Medication reconciliation completed by provider prior to medication history? No    Time spent in this activity: 5 minutes     Prior to Admission medications    Medication Sig Last Dose Taking? Auth Provider   APPLE CIDER VINEGAR PO Take by mouth daily 3/17/2022 at Unknown time Yes Unknown, Entered By History   aspirin 81 MG EC tablet Take 81 mg by mouth daily 3/17/2022 at Unknown time Yes Unknown, Entered By History   calcium-magnesium (CALMAG) 500-250 MG TABS Take 1 tablet by mouth daily 3/17/2022 at Unknown time Yes Unknown, Entered By History   cetirizine (ZYRTEC ALLERGY) 10 MG tablet Take 1 tablet (10 mg) by mouth daily 3/18/2022 at Unknown time Yes Jennyfer Umana MD   mometasone (NASONEX) 50 MCG/ACT nasal spray Spray 2 sprays into both nostrils daily 3/17/2022 at Unknown time Yes Reported, Patient   multivitamin, therapeutic (THERA-VIT) TABS Take 1 tablet by mouth daily 3/17/2022 at Unknown time Yes Unknown, Entered By History   Omega-3 Fatty Acids (OMEGA-3 FISH OIL PO) Take 1 g by mouth daily 3/17/2022 at Unknown time Yes Unknown, Entered By History   VITAMIN E PO Take 1 tablet by mouth daily 3/17/2022 at Unknown time Yes Unknown, Entered By History       The information provided in this note is only as accurate as the sources available at the time of update(s)     Izzy Tanner, Teo, BCCCP

## 2022-03-19 NOTE — CONSULTS
Neurosurgery Consult    HPI    Ms. Franco is a 56-year-old female who presented to the emergency department today with 1 week of a persistent headache.  She has a history of left sided subdural hematoma and craniotomy performed in 2015.  She has had no headaches since that time until a week ago.  She denies any recent head trauma.  She reports that she takes aspirin 81 mg.  She denies any other hematologic disorders.  No definitive cause for her previous subdural hematoma was ever identified according to the patient.    She denies any weakness in her upper or lower extremities, denies any visual changes, denies confusion.  She does report that the usual depression on the left side of her head near the area of her craniotomy has felt more full recently.    Medical history  Type 2 diabetes  Hyperlipidemia  Obesity  Intermittent asthma  Iron deficiency anemia    Social history  Works as a       B/P: 136/91, T: 97.8, P: 88, R: 16       Exam    Alert and oriented in no acute distress lying comfortably in bed  Bilateral upper and lower extremities with 5 out of 5 strength  Negative pronator drift in bilateral upper and lower extremities  Finger-nose slow and accurate bilaterally  Pupils equal reactive to light and accommodation  Extraocular movements intact  Cranial nerves grossly intact  Facial muscles symmetric, shoulder shrug intact  Negative ankle clonus, negative Babinski    Labs    INR, PTT, platelets all within normal range    Imaging    Head CT scan demonstrates    IMPRESSION:  1.  New mixed density right frontal convexity subdural hematoma, as described. There appear to be at least some recent (acute to subacute) blood products within the collection. There is a recent appearing right tentorial leaflet subdural hematoma.   Associated mass effect resulting in up to approximately 8 mm of leftward subfalcine shift and early right uncal herniation. Urgent Neurosurgery consultation recommended.  2.   Narrowing of the right lateral ventricle and third ventricle with slight dilatation of the left lateral ventricle, concerning for early left lateral ventricular entrapment.    Left-sided postsurgical changes from prior craniotomy    Assessment    Mixed density primarily chronic right frontal convexity subdural hematoma with mass-effect up to 8 mm leftward shift.  Recent appearing right tentorial leaflet subdural hematoma    History of left craniotomy for prior subdural hematoma    Plan:      We recommend the patient be admitted to the neurosciences floor, blood pressure maintained less than 150.  We will repeat head CT scan in the morning.  Patient will be made n.p.o. at midnight in case surgical intervention is needed tomorrow.  However at this point in time we are not planning for any urgent surgical intervention, further plans will be made tomorrow after reviewing her CT scan.  If she should have any worsening neurologic symptoms overnight please contact neurosurgery on-call.    Patient does not need Keppra, no need to administer platelets at this time.  We will hold the patient's aspirin.    Imaging case and plan reviewed with Dr. Ortega who is in agreement with the above.    Total time of 45 minutes spent with the patient today in counseling and coordination of care.

## 2022-03-19 NOTE — PROVIDER NOTIFICATION
MD Notification    Notified Person: MD    Notified Person Name: Vane Pham    Notification Date/Time: 03:55    Notification Interaction: AMCOM    Purpose of Notification: Pt requesting Tylenol for headache    Orders Received: Yes, see MAR    Comments:

## 2022-03-19 NOTE — ED PROVIDER NOTES
History     Chief Complaint:  Headache      HPI   Mary Franco is a 56 year old female who presents with her son for evaluation of headache.  The patient notes gradual onset of headache approximately 1 week ago.  She notes the headache started in the frontal area and is spread bilateral with left temporal area focal area of worsening pain.  She notes she has pressure behind her eyes and her pain is worse when she is bending over.  She has had no trauma, fever, neck manipulation, vision changes, hearing changes, focal numbness or weakness, syncope, seizure.  The patient notes she has been taking ibuprofen and Tylenol for her pain which temporizes the symptoms but she notes the headache never goes away completely.  Of note the patient has a history significant for subdural hematoma requiring left bhsbrt-ycmolrq-lunrxbks craniotomy for evacuation of her subdural hematoma on 4/4/2015 with redo left btxsyo-ayvimir-wcxankog decompressive craniectomy and evacuation of reaccumulated subdural hematoma on 4/9/2015.  In 2015 the patient had aphasia and syncopal episode.  The patient was discharged on Keppra but has not taken for several years.  She does take 81 mg aspirin daily    Review of Systems   Constitutional: Negative for chills, fatigue and fever.   HENT: Negative for ear pain, hearing loss, postnasal drip, rhinorrhea, sore throat, tinnitus, trouble swallowing and voice change.    Eyes: Negative for photophobia and visual disturbance.   Respiratory: Negative for chest tightness and shortness of breath.    Cardiovascular: Negative for chest pain.   Gastrointestinal: Negative for abdominal pain and nausea.   Musculoskeletal: Negative for arthralgias, myalgias, neck pain and neck stiffness.   Skin: Negative for rash.   Neurological: Positive for headaches. Negative for dizziness, tremors, seizures, syncope, facial asymmetry, speech difficulty, weakness, light-headedness and numbness.   Psychiatric/Behavioral: Negative  for agitation and confusion.   All other systems reviewed and are negative.    Allergies:  Penicillins  Vicodin [Hydrocodone-Acetaminophen]     Medications:    albuterol (PROAIR HFA/PROVENTIL HFA/VENTOLIN HFA) 108 (90 BASE) MCG/ACT Inhaler  ASPIRIN EC PO  benzonatate (TESSALON) 200 MG capsule  calcium-magnesium (CALMAG) 500-250 MG TABS  cetirizine (ZYRTEC ALLERGY) 10 MG tablet  ferrous sulfate (SLO-FE) 142 (45 FE) MG TBCR  mometasone (NASONEX) 50 MCG/ACT nasal spray  multivitamin, therapeutic (THERA-VIT) TABS  Omega-3 Fatty Acids (OMEGA-3 FISH OIL PO)        Past Medical History:    Past Medical History:   Diagnosis Date     Elevated blood sugar 2015     Obesity      Seasonal allergies      Subdural haemorrhage 2015     Uncomplicated asthma      Patient Active Problem List   Diagnosis     Subdural hematoma (H)     Mild intermittent asthma     History of cranioplasty     Obesity     Seasonal allergies     Type 2 diabetes mellitus without complication, without long-term current use of insulin (H)     Hyperlipidemia LDL goal <100     Iron deficiency anemia, unspecified iron deficiency anemia type        Past Surgical History:    Past Surgical History:   Procedure Laterality Date     COLONOSCOPY N/A 8/14/2017    Procedure: COMBINED COLONOSCOPY, SINGLE OR MULTIPLE BIOPSY/POLYPECTOMY BY BIOPSY;;  Surgeon: Katja aPredes MD;  Location:  GI     CRANIOPLASTY Left 7/9/2015    Procedure: CRANIOPLASTY;  Surgeon: Yovani Willson MD;  Location:  OR     CRANIOTOMY Left 4/4/2015    Procedure: CRANIOTOMY;  Surgeon: Yovani Willson MD;  Location:  OR     CRANIOTOMY Left 4/9/2015    Procedure: CRANIOTOMY;  Surgeon: Yovani Willson MD;  Location:  OR     CRANIOTOMY Left 2015        Family History:    family history includes Diabetes in her father, maternal grandfather, and maternal grandmother; Heart Disease in her maternal grandmother; Hyperlipidemia in her mother.    Social History:    "reports that she has never smoked. She has never used smokeless tobacco. She reports that she does not drink alcohol and does not use drugs.  PCP: Nisa Crook     Physical Exam     Patient Vitals for the past 24 hrs:   BP Temp Temp src Pulse Resp SpO2 Height Weight   03/18/22 2300 120/78 98.1  F (36.7  C) Oral 81 18 94 % -- --   03/18/22 2230 (!) 136/91 -- -- 88 -- 97 % -- --   03/18/22 2215 120/77 -- -- 82 -- 98 % -- --   03/18/22 2145 138/87 -- -- -- -- 97 % -- --   03/18/22 2122 -- -- -- -- 16 98 % -- --   03/18/22 2121 136/87 -- -- 88 -- -- -- --   03/18/22 1931 (!) 156/86 97.8  F (36.6  C) Oral 95 16 98 % 1.6 m (5' 3\") 86.2 kg (190 lb)        Physical Exam  Nursing notes reviewed. Vitals reviewed.  General: Alert. Well kept.  Craniotomy site left temporal area  Eyes:  Conjunctiva non-injected, non-icteric.  Neck/Throat: Moist mucous membranes. Normal voice.  Cardiac: Regular rhythm. Normal heart sounds.  Pulmonary: Clear and equal breath sounds bilaterally.   Abdomen: soft and non-tender.  Musculoskeletal: Normal gross range of motion of all 4 extremities.   Skin: Warm and dry. Normal appearance of visualized exposed skin without rashes or petechiae.  Psych: Affect normal. Good eye contact.  Neurological:  Mental: alert and oriented x 4, follows commands, no aphasia or dysarthria.   Cranial Nerves:  CN II: visual acuity - able to accurately count fingers with each eye. Visual fields intact  CN III, IV, VI: EOM intact, pupils equal and reactive  CN V: facial sensation nl  CN VII: face symmetric, no facial droop  CN VIII: hearing normal  CN IX: palate elevation symmetric, uvula at midline  CN XI SCM normal, shoulder shrug nl  CN XII: tongue midline  Motor: Strength: 5/5 in all major groups of all extremities. Normal tone. No abnormal movements. No pronator drift b/l.  Sensory: temperature, light touch intact.   Coordination: FNF and heel-shin tests intact b/l.   Gait:  Normal.      Emergency Department " Course   EC: 51: 25  Sinus rhythm  When compared with ECG of 2015 no significant change was found  Ventricular rate 83  NE interval 166  QRS duration 88  QT/QTc 394/462  PRT axes 33  27 23    Imaging:  CT Head w/o Contrast   Final Result   Abnormal   IMPRESSION:   1.  New mixed density right frontal convexity subdural hematoma, as described. There appear to be at least some recent (acute to subacute) blood products within the collection. There is a recent appearing right tentorial leaflet subdural hematoma.    Associated mass effect resulting in up to approximately 8 mm of leftward subfalcine shift and early right uncal herniation. Urgent Neurosurgery consultation recommended.   2.  Narrowing of the right lateral ventricle and third ventricle with slight dilatation of the left lateral ventricle, concerning for early left lateral ventricular entrapment.      Findings were discussed with ordering provider Cyndee Cantu by myself at approximately 9:14 PM on 2022.         [Critical Result: Acute intracranial hemorrhage]      Finding was identified on 3/18/2022 9:06 PM.       Cyndee Cantu  was contacted by me on 3/18/2022 9:14 PM and verbalized understanding of the critical result.          Report per radiology    Laboratory:  Labs Ordered and Resulted from Time of ED Arrival to Time of ED Departure   BASIC METABOLIC PANEL - Abnormal       Result Value    Sodium 137      Potassium 4.0      Chloride 105      Carbon Dioxide (CO2) 28      Anion Gap 4      Urea Nitrogen 18      Creatinine 0.50 (*)     Calcium 9.4      Glucose 285 (*)     GFR Estimate >90     CBC WITH PLATELETS AND DIFFERENTIAL - Abnormal    WBC Count 5.9      RBC Count 4.99      Hemoglobin 12.5      Hematocrit 39.7      MCV 80      MCH 25.1 (*)     MCHC 31.5      RDW 15.7 (*)     Platelet Count 334      % Neutrophils 49      % Lymphocytes 44      % Monocytes 5      % Eosinophils 2      % Basophils 0      % Immature Granulocytes 0      NRBCs  per 100 WBC 0      Absolute Neutrophils 2.8      Absolute Lymphocytes 2.6      Absolute Monocytes 0.3      Absolute Eosinophils 0.1      Absolute Basophils 0.0      Absolute Immature Granulocytes 0.0      Absolute NRBCs 0.0     ERYTHROCYTE SEDIMENTATION RATE AUTO - Normal    Erythrocyte Sedimentation Rate 22     CRP INFLAMMATION - Normal    CRP Inflammation 7.0     INR - Normal    INR 0.97     PARTIAL THROMBOPLASTIN TIME - Normal    aPTT 27     COVID-19 VIRUS (CORONAVIRUS) BY PCR - Normal    SARS CoV2 PCR Negative     TYPE AND SCREEN, ADULT    ABO/RH(D) O POS      Antibody Screen Negative      SPECIMEN EXPIRATION DATE [id number]     ABO/RH TYPE AND SCREEN       Emergency Department Course:  Reviewed:  I reviewed nursing notes, vitals, past medical history and Care Everywhere    Assessments:  1945 I obtained history and examined the patient as noted above.   2115 I rechecked the patient and explained findings.   2140 I rechecked the patient and explained plan of care.   2230 I rechecked the patient and explained plan of care.     Consults:   2113 [facility], Dr. Tubbs, called regarding patient CT results.  2115 I paged neurosurgery  2120 I discussed the patient in shared service with Dr. Peralta.  Patient was moved to stabilization room.  2130 I spoke to Ranjeet NORRIS neurosurgery who will contact staff neurosurgeon, he requested ICU admission, and maintaining blood pressure less than 150/  2149 I spoke to Ranjeet NORRIS neurosurgery who spoke with Dr. Ortega, neurosurgery, who recommended admission to neurology floor with orders for no Keppra, CT in the a.m., and maintaining blood pressure less than 150/.  2215 I spoke to Dr. De La Vega, hospitalist, who accepts for admission to neuro critical care  2230 Ranjeet NORRIS neurosurgery, here to see patient.  He recommends no platelet administration at this time.    Interventions:  Medications   morphine (PF) injection 2 mg (2 mg Intravenous Given 3/18/22 2211)       Disposition:  The patient was admitted to the hospital under the care of Dr. De La Vega.     Impression & Plan    Covid-19  Mary Franco was evaluated during a global COVID-19 pandemic, which necessitated consideration that the patient might be at risk for infection with the SARS-CoV-2 virus that causes COVID-19.   Applicable protocols for evaluation were followed during the patient's care.   COVID-19 was considered as part of the patient's evaluation. The plan for testing is:  a test was obtained during this visit.    Medical Decision Making:  Mary Franco is a 56 year old female who presents with her son for evaluation of headache.   On exam she has no neurologic deficit and denies seizures or syncope.  Patient is anticoagulated on aspirin.  Head CT was obtained and shows right frontal subdural hematoma with mixed density concerning for acute to subacute blood products with a recent appearing right tentorial leaflet subdural hematoma.  CT shows an associated mass-effect up to 8 mm of leftward subfalcine shift and early right uncal herniation.  I spoke with neurosurgery, who evaluated the patient in the emergency department, and recommends admission to the neuro critical care with no indication for ICU this evening.  They requested no platelets, Keppra, but did request blood pressure remained less than 150/and repeat head CT in the a.m..  The patient remained neurologically without deficit while in the emergency department.  She did receive morphine for her symptoms with improvement.  I spoke with Dr. De La Vega, hospitalist, who accepts patient for admission with neurosurgery consultation.  The patient and her son are in agreement with this plan    Diagnosis:    ICD-10-CM    1. Subdural hematoma (H)  S06.5X9A       3/18/2022   [place], CNP        [place], CNP  03/18/22 4117

## 2022-03-19 NOTE — PROGRESS NOTES
"[facility]    HOSPITALIST PROGRESS NOTE :   --------------------------------------------------    Date of Admission:  3/18/2022    Cumulative Summary: Mary Franco is a 56 year old female admitted on 3/18/2022. She presents to the emergency department for evaluation of persistent worsening headache similar to prior spontaneous subdural hematoma in 2015 and is found to have again a spontaneous subdural hematoma, this time right frontal parietal with 8 mm leftward subfalcine shift and early uncal herniation.    Assessment & Plan     Spontaneous right frontal subdural hematoma:   History of spontaneous subdural hematoma in 2015  Patient presented to ER for evaluation of persistent worsening headache for 1 week.  Patient is on baby aspirin daily, also took some Aleve to help her pain at home  CT scan of the head showed new mixed density right frontal convexity subdural hematoma there appears to be at least some recent acute to subacute blood products.  Patient was also noticed to have 8 mm leftward subfalcine shift with early uncal herniation and concern for left lateral ventricular entrapment.   No focal neurologic deficits have been appreciated.   Patient does have some speech thickening when she is tired and occasionally some word substitution when she is \"frazzled\" which are residual from her 2015 subdural.    -- Patient care was assumed this morning , seen and examined , son is also present in the room  -- Elevate head of bed per protocol  -- Continue Neuro IMC status as recommended by NS with Q2 hour neuro checks   -- Recommending to keep Systolic BP less than 150   -- Plan to repeat CT this morning , CT showing stable mixed density subdural hematoma overlying the right cerebral hemisphere with a stable 9 mm right-to-left midline shift  -- Plan to make patient NPO after midnight as per patient NS is planning hematoma evacuation tomorrow morning    -- Patient is currently receiving IV " saline at 100 ml/hr , will decrease the rate to 75 ml/hr   -- Hold PTA ASA,  has discussed with patient as well as son at bedside on admission my concern for restarting this.  Patient takes this medication for primary prophylaxis in the setting of type 2 diabetes, though now has had 2 spontaneous subdural hematomas.  -- Per NS, patient does not need Keppra or Platelet transfusion at this time   -- Will discontinue Neuro critical care consultation as they would not be able to evaluate patient as patient is not in ICU   -- Will discuss with neurosurgery if there is any need for patient evaluation by general Neurology or stroke Neurology   -- Patient might benefit from angiogram in future   -- Outpatient ophthalmology follow-up    Pre op optimization : patient is denying any history of essential HTN , MI,CHF or CKD  She is denying any reaction from anesthesia when she underwent intracranial surgery in 2015   -- As above ASA is stopped   -- EKG done on admission is showing NSR  -- Her Perioperative risk is 0.04% for perioperative myocardial infarction or cardiac arrest     Type 2 diabetes: Currently with hyperglycemia, glucose of 285 on arrival.  Reports she is no longer on medications for diabetes, managed with diet and exercise    -- Hemoglobin A1c came back 12.5   -- Continue with diet and exercise; patient reports that she has lost approximately 18 pounds with diet and exercise  --Medium dose sliding scale insulin every 4 hours while n.p.o.;   --  Patient was also initiated on Lantus 10 units every morning, might give patient only 5 units tomorrow morning if patient will be NPO after midnight   -- I did have discussion with patient and her son regarding the importance of going home with Lantus and oral hypoglycemic agents for meal coverage for some time   -- Once her HgA1c is improved , then they can continue to work with the PCP if her diabetes can be managed with oral hypoglycemic agents only     Clinically  "Significant Risk Factors Present on Admission     # Platelet Defect: home medication list includes an antiplatelet medication   # Diabetes, type II: last A1C 12.5 % (Ref range: 0.0 - 5.6 %)  # Obesity: Estimated body mass index is 33.66 kg/m  as calculated from the following:    Height as of this encounter: 1.6 m (5' 3\").    Weight as of this encounter: 86.2 kg (190 lb).      Diet: Regular Diet Adult  NPO per Anesthesia Guidelines for Procedure/Surgery Except for: Meds    Nichole Catheter: Not present  DVT Prophylaxis: Pneumatic Compression Devices  Code Status: Full Code    The patient's care was discussed with the Bedside Nurse, Patient and Patient's Family.    Disposition Plan   Expected Discharge: 03/21/2022     Anticipated discharge location:  Awaiting care coordination huddle   Patient will need PT/OT assessment after the procedure for safe disposition plan, hoping patient would be able to return home    35 min were spent in direct patient care today including the floor time , more than 50% of the time was spent in patient care coordination and counseling.    Obdulia Mitchell MD, FACP  Text Page (7am - 6pm)    ----------------------------------------------------------------------------------------------------------------------    Interval History   Patient care was assumed this morning, seen and examined , son also present at the bedside  , main complaint is headache which most likely sec to subdural Hge , discussed with patient this mostly this headache does not respond very well to pain med's but we can try to switch Mortphine to Dilaudid .  Patient is denying any new weakness or sensory deficit   She is denying any chest pain , SOB or palpitations     -Data reviewed today: I reviewed all new labs and imaging results over the last 24 hours.    I personally reviewed the head CT image(s) showing Subdural hematoma, EKG is showing normal sinus rhythm.    Physical Exam   Temp: 98.6  F (37  C) Temp src: Oral BP: 110/69 " Pulse: 82   Resp: 18 SpO2: 97 % O2 Device: None (Room air)    Vitals:    03/18/22 1931   Weight: 86.2 kg (190 lb)     Vital Signs with Ranges  Temp:  [97.5  F (36.4  C)-98.6  F (37  C)] 98.6  F (37  C)  Pulse:  [76-97] 82  Resp:  [16-18] 18  BP: ()/(65-91) 110/69  SpO2:  [94 %-98 %] 97 %  No intake/output data recorded.    GENERAL: Alert , awake and oriented. NAD. Conversational, appropriate.   HEENT: Normocephalic. EOMI. No icterus or injection. Nares normal.   LUNGS: Clear to auscultation. No dyspnea at rest.   HEART: Regular rate. Extremities perfused.   ABDOMEN: Soft, nontender, and nondistended. Positive bowel sounds.   EXTREMITIES: No LE edema noted.   NEUROLOGIC: Moves extremities x4 on command. No acute focal neurologic abnormalities noted.     Medications     - MEDICATION INSTRUCTIONS -       sodium chloride 100 mL/hr at 03/19/22 1126       insulin aspart  1-7 Units Subcutaneous TID AC     insulin aspart  1-5 Units Subcutaneous At Bedtime     insulin glargine  10 Units Subcutaneous QAM AC     sodium chloride (PF)  3 mL Intracatheter Q8H       Data   Recent Labs   Lab 03/19/22  1047 03/19/22  0604 03/19/22  0303 03/18/22  2205 03/18/22 2020 03/18/22 2017   WBC  --   --   --   --  5.9  --    HGB  --   --   --   --  12.5  --    MCV  --   --   --   --  80  --    PLT  --   --   --   --  334  --    INR  --   --   --  0.97  --   --    NA  --   --   --   --   --  137   POTASSIUM  --   --   --   --   --  4.0   CHLORIDE  --   --   --   --   --  105   CO2  --   --   --   --   --  28   BUN  --   --   --   --   --  18   CR  --   --   --   --   --  0.50*   ANIONGAP  --   --   --   --   --  4   WINDY  --   --   --   --   --  9.4   * 204* 296*  --   --  285*       Imaging:   Recent Results (from the past 24 hour(s))   CT Head w/o Contrast   Result Value    Radiologist flags Acute intracranial hemorrhage (AA)    Narrative    EXAM: CT HEAD W/O CONTRAST  LOCATION: [facility]  DATE/TIME:  3/18/2022 8:57 PM    INDICATION: Headache, intracranial hemorrhage suspected.  COMPARISON: 01/13/2017.  TECHNIQUE: Routine CT Head without IV contrast. Multiplanar reformats. Dose reduction techniques were used.    FINDINGS:  INTRACRANIAL CONTENTS: There is a new mixed density right frontal convexity subdural hematoma measuring up to approximately 13 mm in radial thickness. The hematoma is predominantly hypodense, but there are smaller hyperdense components suggesting at   least some acute/subacute component. There is also a hyperdense subdural hematoma component along the superior aspect of the right tentorial leaflet measuring up to approximately 3 mm in thickness. There is significant associated mass effect with up to   approximately 8 mm of leftward midline shift at the level of the foramen of Monro (series 2 image 12). There is significant narrowing of the right lateral ventricle and third ventricle with suggestion of slight dilatation of the atrium, occipital horn,   and temporal horn of left lateral ventricle, concerning for early left lateral ventricular entrapment. Early right uncal herniation is noted (series 2 image 9) with medialization of the right uncus/mesial temporal lobe over the tentorial leaflet and mild   narrowing of the ambient cisterns. No cerebellar tonsillar herniation seen.    No acute intraparenchymal hemorrhage is identified. No intraventricular hemorrhage seen. The cortical gray-white distinction appears to be maintained.    VISUALIZED ORBITS/SINUSES/MASTOIDS: No intraorbital abnormality. No paranasal sinus mucosal disease. No middle ear or mastoid effusion.    BONES/SOFT TISSUES: No acute abnormality. Redemonstrated wide left craniotomy changes, as before.      Impression    IMPRESSION:  1.  New mixed density right frontal convexity subdural hematoma, as described. There appear to be at least some recent (acute to subacute) blood products within the collection. There is a recent  appearing right tentorial leaflet subdural hematoma.   Associated mass effect resulting in up to approximately 8 mm of leftward subfalcine shift and early right uncal herniation. Urgent Neurosurgery consultation recommended.  2.  Narrowing of the right lateral ventricle and third ventricle with slight dilatation of the left lateral ventricle, concerning for early left lateral ventricular entrapment.    Findings were discussed with ordering provider Cyndee Cantu by myself at approximately 9:14 PM on 03/18/2022.      [Critical Result: Acute intracranial hemorrhage]    Finding was identified on 3/18/2022 9:06 PM.     Cyndee Cantu  was contacted by me on 3/18/2022 9:14 PM and verbalized understanding of the critical result.    CT Head w/o Contrast    Narrative    EXAM: CT HEAD WITHOUT CONTRAST  LOCATION: [facility]  DATE/TIME: 03/19/2022, 5:40 AM    INDICATION: Cerebral hemorrhage suspected. Headache, intracranial hemorrhage suspected.  COMPARISON: 03/18/2022.  TECHNIQUE: Routine CT Head without IV contrast. Multiplanar reformats. Dose reduction techniques were used.    FINDINGS:  INTRACRANIAL CONTENTS: Stable mixed predominantly low-density subdural hemorrhage overlying the right frontal lobe. Essentially stable mass effect with a 9 mm right-to-left midline shift. Stable subdural hematoma along the right tentorial leaflet. No CT   evidence of acute infarct. Normal parenchymal attenuation. Stable asymmetric prominence of the left lateral ventricle suggesting early trapped ventricle. There is stable right uncal herniation. There may be a small amount of subarachnoid hemorrhage   anterior to the right temporal lobe (axial image 9 series 3).     VISUALIZED ORBITS/SINUSES/MASTOIDS: No intraorbital abnormality. No paranasal sinus mucosal disease. No middle ear or mastoid effusion.    BONES/SOFT TISSUES: Old left craniotomy changes.      Impression    IMPRESSION:  1.  Stable mixed density subdural  hematoma overlying the right cerebral hemisphere with a stable 9 mm right-to-left midline shift.  2.  Stable thin subdural hematoma along the right tentorial leaflet.  3.  Stable prominence of the left lateral ventricle worrisome for early entrapment.  4.  Stable mild right uncal herniation.  5.  There may be a small amount of subarachnoid hemorrhage anterior to the right uncus as detailed above.  6.  No evidence for acute infarct.

## 2022-03-19 NOTE — ED TRIAGE NOTES
Pt here after being seen at . She states she went in today after having a headache for a week that won't go away. Pt states the headache goes from being dull to sharp. Pt denies any speech changes, denies dizziness, and denies any other neuro deficits or changes. Pt states that she had a subdural in the past and recalls having a headache for a long time before she she was diagnosed. Pt A&Ox4

## 2022-03-19 NOTE — PLAN OF CARE
Pt here with R SDH with midline shift. Pt is A&Ox4, VSS on RA. Independent. Regular diet. Neuros intact except for headache, improved with Tylenol and Dilaudid. Takes pills whole with water. NS running at 75ml/hr. Tele NSR. Pt scoring green on aggression stop light tool. Plan for pt to have R stacey hole placement for SDH evac tomorrow AM at 0730. Pt to be NPO at midnight.

## 2022-03-19 NOTE — PROVIDER NOTIFICATION
"Paged Dr. Mitchell \" [phone]: pt wondering if zyrtec can be added to her med list? she takes it daily. Thank you! ROJAS Montalvo\"  "

## 2022-03-19 NOTE — PROGRESS NOTES
Neurosurgery progress note    Patient states she feels about the same this morning, reports a little bit more fullness on the left side of her head where she had her previous surgery.  Reports a mild headache.  Denies any new symptoms in her upper or lower extremities.  He had a repeat head CT scan this morning which was stable.    Exam    Alert and oriented no acute distress  Moving all extremities appropriate  Pupils equal reactive to light and accommodation  Extraocular wounds intact  Negative pronator drift  Finger-nose slow neck.    Assessment    Mixed density primarily chronic right frontal convexity subdural hematoma with mass-effect up to 8 mm leftward shift.  Recent appearing right tentorial leaflet subdural hematoma     History of left craniotomy for prior subdural hematoma    Plan    Plan for Right stacey hole placement for evacuation of SDH tomorrow at 730 am with Dr. Ortega.

## 2022-03-19 NOTE — PROGRESS NOTES
03/19/22 1100   Quick Adds   Type of Visit Initial Occupational Therapy Evaluation   Living Environment   People in Home alone   Current Living Arrangements   ([facility])   Transportation Anticipated car, drives self;family or friend will provide   Living Environment Comments Split entry [facility]. Son, daughter in law and grandkids live near by.    Self-Care   Usual Activity Tolerance excellent   Current Activity Tolerance good   Fall history within last six months no   Activity/Exercise/Self-Care Comment (I) in ADLs   Instrumental Activities of Daily Living (IADL)   Previous Responsibilities meal prep;housekeeping;laundry;shopping;medication management;finances;driving;yardwork;work   IADL Comments (I) in IADLs. Watch [employer] 3 days a week.    General Information   Onset of Illness/Injury or Date of Surgery 03/18/22   Referring Physician De La Vega, Mynor Rivera MD   Patient/Family Therapy Goal Statement (OT) Home if drain goes well   Additional Occupational Profile Info/Pertinent History of Current Problem Mary Franco is a 56 year old female admitted on 3/18/2022. She presents to the emergency department for evaluation of persistent worsening headache similar to prior spontaneous subdural hematoma in 2015 and is found to have again a spontaneous subdural hematoma, this time right frontal parietal with 8 mm leftward subfalcine shift and early uncal herniation.   Existing Precautions/Restrictions fall   Cognitive Status Examination   Orientation Status orientation to person, place and time   Visual Perception   Visual Impairment/Limitations corrective lenses full-time   Pain Assessment   Patient Currently in Pain Yes, see Vital Sign flowsheet  (6/10)   Range of Motion Comprehensive   General Range of Motion no range of motion deficits identified   Strength Comprehensive (MMT)   General Manual Muscle Testing (MMT) Assessment no strength deficits identified   Coordination   Upper Extremity Coordination No deficits  were identified   Bed Mobility   Bed Mobility supine-sit;sit-supine   Supine-Sit [place] (Bed Mobility) independent   Sit-Supine [place] (Bed Mobility) independent   Transfers   Transfers bed-chair transfer;sit-stand transfer;toilet transfer   Transfer Skill: Bed to Chair/Chair to Bed   Bed-Chair [place] (Transfers) independent   Sit-Stand Transfer   Sit-Stand [place] (Transfers) independent   Toilet Transfer   [place] Level (Toilet Transfer) independent   Balance   Balance Comments No LOB   Activities of Daily Living   BADL Assessment/Intervention upper body dressing;lower body dressing;toileting   Upper Body Dressing Assessment/Training   [place] Level (Upper Body Dressing) independent   Lower Body Dressing Assessment/Training   [place] Level (Lower Body Dressing) independent   Toileting   [place] Level (Toileting) independent   Clinical Impression   Criteria for Skilled Therapeutic Interventions Met (OT) Evaluation only   OT Diagnosis Possible impaired (I) in I/ADLs   OT Problem List-Impairments impacting ADL pain;problems related to   Assessment of Occupational Performance 1-3 Performance Deficits   Identified Performance Deficits Currently (I) in functional mobility and ADLs   Clinical Decision Making Complexity (OT) low complexity   Risk & Benefits of therapy have been explained evaluation/treatment results reviewed;care plan/treatment goals reviewed;risks/benefits reviewed;current/potential barriers reviewed;participants voiced agreement with care plan;participants included;patient;son   OT Discharge Planning   OT Discharge Recommendation (DC Rec) home   OT Rationale for DC Rec Pt functioning near baseline w/ headache pain limiting her. Currently (I) in functional mobility and ADLs. No cognitive concerns. Pending recovery after drain is placed for her subdural hematoma, anticipate pt can return home safely.    OT Brief overview of current status (I) in functional  mobility and ADLs   Total Evaluation Time (Minutes)   Total Evaluation Time (Minutes) 20   OT Goals   Therapy Frequency (OT)   (Eval only)   OT Predicated Duration/Target Date for Goal Attainment 03/19/22   OT Goals Hygiene/Grooming;Upper Body Dressing;Lower Body Dressing;Toilet Transfer/Toileting   OT: Hygiene/Grooming independent;Goal Met   OT: Upper Body Dressing Independent;Goal Met   OT: Lower Body Dressing Independent;Goal Met   OT: Toilet Transfer/Toileting Independent;Goal Met

## 2022-03-20 ENCOUNTER — ANESTHESIA (OUTPATIENT)
Dept: SURGERY | Facility: CLINIC | Age: 57
DRG: 025 | End: 2022-03-20
Payer: COMMERCIAL

## 2022-03-20 LAB
ALBUMIN SERPL-MCNC: 3.1 G/DL (ref 3.4–5)
ALP SERPL-CCNC: 70 U/L (ref 40–150)
ALT SERPL W P-5'-P-CCNC: 24 U/L (ref 0–50)
ANION GAP SERPL CALCULATED.3IONS-SCNC: 2 MMOL/L (ref 3–14)
AST SERPL W P-5'-P-CCNC: 12 U/L (ref 0–45)
BILIRUB SERPL-MCNC: 0.3 MG/DL (ref 0.2–1.3)
BUN SERPL-MCNC: 18 MG/DL (ref 7–30)
CALCIUM SERPL-MCNC: 9.1 MG/DL (ref 8.5–10.1)
CHLORIDE BLD-SCNC: 110 MMOL/L (ref 94–109)
CO2 SERPL-SCNC: 28 MMOL/L (ref 20–32)
CREAT SERPL-MCNC: 0.6 MG/DL (ref 0.52–1.04)
ERYTHROCYTE [DISTWIDTH] IN BLOOD BY AUTOMATED COUNT: 16 % (ref 10–15)
GFR SERPL CREATININE-BSD FRML MDRD: >90 ML/MIN/1.73M2
GLUCOSE BLD-MCNC: 224 MG/DL (ref 70–99)
GLUCOSE BLDC GLUCOMTR-MCNC: 210 MG/DL (ref 70–99)
GLUCOSE BLDC GLUCOMTR-MCNC: 211 MG/DL (ref 70–99)
GLUCOSE BLDC GLUCOMTR-MCNC: 229 MG/DL (ref 70–99)
GLUCOSE BLDC GLUCOMTR-MCNC: 256 MG/DL (ref 70–99)
GLUCOSE BLDC GLUCOMTR-MCNC: 263 MG/DL (ref 70–99)
HCT VFR BLD AUTO: 36.5 % (ref 35–47)
HGB BLD-MCNC: 11.2 G/DL (ref 11.7–15.7)
MCH RBC QN AUTO: 25.3 PG (ref 26.5–33)
MCHC RBC AUTO-ENTMCNC: 30.7 G/DL (ref 31.5–36.5)
MCV RBC AUTO: 82 FL (ref 78–100)
PLATELET # BLD AUTO: 296 10E3/UL (ref 150–450)
POTASSIUM BLD-SCNC: 4 MMOL/L (ref 3.4–5.3)
PROT SERPL-MCNC: 6.5 G/DL (ref 6.8–8.8)
RBC # BLD AUTO: 4.43 10E6/UL (ref 3.8–5.2)
SODIUM SERPL-SCNC: 140 MMOL/L (ref 133–144)
WBC # BLD AUTO: 5.8 10E3/UL (ref 4–11)

## 2022-03-20 PROCEDURE — 258N000003 HC RX IP 258 OP 636

## 2022-03-20 PROCEDURE — 250N000009 HC RX 250: Performed by: ANESTHESIOLOGY

## 2022-03-20 PROCEDURE — 250N000009 HC RX 250

## 2022-03-20 PROCEDURE — 250N000013 HC RX MED GY IP 250 OP 250 PS 637: Performed by: PHYSICIAN ASSISTANT

## 2022-03-20 PROCEDURE — 258N000003 HC RX IP 258 OP 636: Performed by: PHYSICIAN ASSISTANT

## 2022-03-20 PROCEDURE — 85027 COMPLETE CBC AUTOMATED: CPT | Performed by: INTERNAL MEDICINE

## 2022-03-20 PROCEDURE — 258N000003 HC RX IP 258 OP 636: Performed by: ANESTHESIOLOGY

## 2022-03-20 PROCEDURE — C1713 ANCHOR/SCREW BN/BN,TIS/BN: HCPCS | Performed by: NEUROLOGICAL SURGERY

## 2022-03-20 PROCEDURE — 250N000011 HC RX IP 250 OP 636

## 2022-03-20 PROCEDURE — 250N000005 HC OR RX SURGIFLO HEMOSTATIC MATRIX 10ML 199102S OPNP: Performed by: NEUROLOGICAL SURGERY

## 2022-03-20 PROCEDURE — 250N000013 HC RX MED GY IP 250 OP 250 PS 637: Performed by: INTERNAL MEDICINE

## 2022-03-20 PROCEDURE — 250N000011 HC RX IP 250 OP 636: Performed by: NEUROLOGICAL SURGERY

## 2022-03-20 PROCEDURE — 250N000011 HC RX IP 250 OP 636: Performed by: INTERNAL MEDICINE

## 2022-03-20 PROCEDURE — 999N000141 HC STATISTIC PRE-PROCEDURE NURSING ASSESSMENT: Performed by: NEUROLOGICAL SURGERY

## 2022-03-20 PROCEDURE — 710N000010 HC RECOVERY PHASE 1, LEVEL 2, PER MIN: Performed by: NEUROLOGICAL SURGERY

## 2022-03-20 PROCEDURE — 250N000011 HC RX IP 250 OP 636: Performed by: ANESTHESIOLOGY

## 2022-03-20 PROCEDURE — 120N000001 HC R&B MED SURG/OB

## 2022-03-20 PROCEDURE — 370N000017 HC ANESTHESIA TECHNICAL FEE, PER MIN: Performed by: NEUROLOGICAL SURGERY

## 2022-03-20 PROCEDURE — 250N000011 HC RX IP 250 OP 636: Performed by: PHYSICIAN ASSISTANT

## 2022-03-20 PROCEDURE — 360N000078 HC SURGERY LEVEL 5, PER MIN: Performed by: NEUROLOGICAL SURGERY

## 2022-03-20 PROCEDURE — 272N000001 HC OR GENERAL SUPPLY STERILE: Performed by: NEUROLOGICAL SURGERY

## 2022-03-20 PROCEDURE — 61314 CRNEC/CRNOT ITTL XDRL/SDRL: CPT | Performed by: NEUROLOGICAL SURGERY

## 2022-03-20 PROCEDURE — 80053 COMPREHEN METABOLIC PANEL: CPT | Performed by: INTERNAL MEDICINE

## 2022-03-20 PROCEDURE — 250N000009 HC RX 250: Performed by: NEUROLOGICAL SURGERY

## 2022-03-20 PROCEDURE — 00C43ZZ EXTIRPATION OF MATTER FROM INTRACRANIAL SUBDURAL SPACE, PERCUTANEOUS APPROACH: ICD-10-PCS | Performed by: NEUROLOGICAL SURGERY

## 2022-03-20 PROCEDURE — 36415 COLL VENOUS BLD VENIPUNCTURE: CPT | Performed by: INTERNAL MEDICINE

## 2022-03-20 PROCEDURE — 250N000025 HC SEVOFLURANE, PER MIN: Performed by: NEUROLOGICAL SURGERY

## 2022-03-20 PROCEDURE — 99233 SBSQ HOSP IP/OBS HIGH 50: CPT | Performed by: INTERNAL MEDICINE

## 2022-03-20 DEVICE — IMP PLATE BURR HOLE COVER  MATRIXNEURO SHUNT 17MM 04.503.028: Type: IMPLANTABLE DEVICE | Site: CRANIAL | Status: FUNCTIONAL

## 2022-03-20 DEVICE — IMP SCR SYN MATRIX LOW PRO 1.5X04MM SELF DRILL 04.503.104.01: Type: IMPLANTABLE DEVICE | Site: CRANIAL | Status: FUNCTIONAL

## 2022-03-20 RX ORDER — HYDROMORPHONE HCL IN WATER/PF 6 MG/30 ML
0.4 PATIENT CONTROLLED ANALGESIA SYRINGE INTRAVENOUS EVERY 5 MIN PRN
Status: DISCONTINUED | OUTPATIENT
Start: 2022-03-20 | End: 2022-03-20 | Stop reason: HOSPADM

## 2022-03-20 RX ORDER — METHOCARBAMOL 750 MG/1
750 TABLET, FILM COATED ORAL EVERY 6 HOURS PRN
Status: DISCONTINUED | OUTPATIENT
Start: 2022-03-20 | End: 2022-03-22 | Stop reason: HOSPADM

## 2022-03-20 RX ORDER — FLUTICASONE PROPIONATE 50 MCG
2 SPRAY, SUSPENSION (ML) NASAL DAILY
Status: DISCONTINUED | OUTPATIENT
Start: 2022-03-20 | End: 2022-03-22 | Stop reason: HOSPADM

## 2022-03-20 RX ORDER — GLYCOPYRROLATE 0.2 MG/ML
INJECTION, SOLUTION INTRAMUSCULAR; INTRAVENOUS PRN
Status: DISCONTINUED | OUTPATIENT
Start: 2022-03-20 | End: 2022-03-20

## 2022-03-20 RX ORDER — SODIUM CHLORIDE 9 MG/ML
INJECTION, SOLUTION INTRAVENOUS CONTINUOUS
Status: DISCONTINUED | OUTPATIENT
Start: 2022-03-20 | End: 2022-03-22 | Stop reason: HOSPADM

## 2022-03-20 RX ORDER — ONDANSETRON 2 MG/ML
4 INJECTION INTRAMUSCULAR; INTRAVENOUS EVERY 30 MIN PRN
Status: DISCONTINUED | OUTPATIENT
Start: 2022-03-20 | End: 2022-03-20 | Stop reason: HOSPADM

## 2022-03-20 RX ORDER — ONDANSETRON 2 MG/ML
4 INJECTION INTRAMUSCULAR; INTRAVENOUS EVERY 6 HOURS PRN
Status: DISCONTINUED | OUTPATIENT
Start: 2022-03-20 | End: 2022-03-22 | Stop reason: HOSPADM

## 2022-03-20 RX ORDER — FENTANYL CITRATE 50 UG/ML
INJECTION, SOLUTION INTRAMUSCULAR; INTRAVENOUS PRN
Status: DISCONTINUED | OUTPATIENT
Start: 2022-03-20 | End: 2022-03-20

## 2022-03-20 RX ORDER — MEPERIDINE HYDROCHLORIDE 25 MG/ML
12.5 INJECTION INTRAMUSCULAR; INTRAVENOUS; SUBCUTANEOUS EVERY 5 MIN PRN
Status: DISCONTINUED | OUTPATIENT
Start: 2022-03-20 | End: 2022-03-20 | Stop reason: HOSPADM

## 2022-03-20 RX ORDER — SODIUM CHLORIDE, SODIUM LACTATE, POTASSIUM CHLORIDE, CALCIUM CHLORIDE 600; 310; 30; 20 MG/100ML; MG/100ML; MG/100ML; MG/100ML
INJECTION, SOLUTION INTRAVENOUS CONTINUOUS
Status: DISCONTINUED | OUTPATIENT
Start: 2022-03-20 | End: 2022-03-20 | Stop reason: HOSPADM

## 2022-03-20 RX ORDER — MAGNESIUM OXIDE 400 MG/1
400 TABLET ORAL DAILY
Status: DISCONTINUED | OUTPATIENT
Start: 2022-03-20 | End: 2022-03-22 | Stop reason: HOSPADM

## 2022-03-20 RX ORDER — CALCIUM CARBONATE 500(1250)
500 TABLET ORAL DAILY
Status: DISCONTINUED | OUTPATIENT
Start: 2022-03-20 | End: 2022-03-22 | Stop reason: HOSPADM

## 2022-03-20 RX ORDER — LIDOCAINE HYDROCHLORIDE 20 MG/ML
INJECTION, SOLUTION INFILTRATION; PERINEURAL PRN
Status: DISCONTINUED | OUTPATIENT
Start: 2022-03-20 | End: 2022-03-20

## 2022-03-20 RX ORDER — PROCHLORPERAZINE MALEATE 10 MG
10 TABLET ORAL EVERY 6 HOURS PRN
Status: DISCONTINUED | OUTPATIENT
Start: 2022-03-20 | End: 2022-03-22 | Stop reason: HOSPADM

## 2022-03-20 RX ORDER — NEOSTIGMINE METHYLSULFATE 1 MG/ML
VIAL (ML) INJECTION PRN
Status: DISCONTINUED | OUTPATIENT
Start: 2022-03-20 | End: 2022-03-20

## 2022-03-20 RX ORDER — POLYETHYLENE GLYCOL 3350 17 G/17G
17 POWDER, FOR SOLUTION ORAL DAILY
Status: DISCONTINUED | OUTPATIENT
Start: 2022-03-21 | End: 2022-03-22 | Stop reason: HOSPADM

## 2022-03-20 RX ORDER — MAGNESIUM HYDROXIDE 1200 MG/15ML
LIQUID ORAL PRN
Status: DISCONTINUED | OUTPATIENT
Start: 2022-03-20 | End: 2022-03-20 | Stop reason: HOSPADM

## 2022-03-20 RX ORDER — ONDANSETRON 2 MG/ML
INJECTION INTRAMUSCULAR; INTRAVENOUS PRN
Status: DISCONTINUED | OUTPATIENT
Start: 2022-03-20 | End: 2022-03-20

## 2022-03-20 RX ORDER — CLINDAMYCIN PHOSPHATE 900 MG/50ML
900 INJECTION, SOLUTION INTRAVENOUS
Status: DISCONTINUED | OUTPATIENT
Start: 2022-03-20 | End: 2022-03-20 | Stop reason: HOSPADM

## 2022-03-20 RX ORDER — BUPIVACAINE HYDROCHLORIDE 2.5 MG/ML
INJECTION, SOLUTION EPIDURAL; INFILTRATION; INTRACAUDAL PRN
Status: DISCONTINUED | OUTPATIENT
Start: 2022-03-20 | End: 2022-03-20 | Stop reason: HOSPADM

## 2022-03-20 RX ORDER — LIDOCAINE 40 MG/G
CREAM TOPICAL
Status: DISCONTINUED | OUTPATIENT
Start: 2022-03-20 | End: 2022-03-22 | Stop reason: HOSPADM

## 2022-03-20 RX ORDER — AMOXICILLIN 250 MG
1 CAPSULE ORAL 2 TIMES DAILY
Status: DISCONTINUED | OUTPATIENT
Start: 2022-03-20 | End: 2022-03-22 | Stop reason: HOSPADM

## 2022-03-20 RX ORDER — OXYCODONE HYDROCHLORIDE 5 MG/1
5 TABLET ORAL EVERY 4 HOURS PRN
Status: DISCONTINUED | OUTPATIENT
Start: 2022-03-20 | End: 2022-03-20 | Stop reason: HOSPADM

## 2022-03-20 RX ORDER — ONDANSETRON 4 MG/1
4 TABLET, ORALLY DISINTEGRATING ORAL EVERY 30 MIN PRN
Status: DISCONTINUED | OUTPATIENT
Start: 2022-03-20 | End: 2022-03-20 | Stop reason: HOSPADM

## 2022-03-20 RX ORDER — ONDANSETRON 4 MG/1
4 TABLET, ORALLY DISINTEGRATING ORAL EVERY 6 HOURS PRN
Status: DISCONTINUED | OUTPATIENT
Start: 2022-03-20 | End: 2022-03-22 | Stop reason: HOSPADM

## 2022-03-20 RX ORDER — ALBUTEROL SULFATE 0.83 MG/ML
2.5 SOLUTION RESPIRATORY (INHALATION) EVERY 4 HOURS PRN
Status: DISCONTINUED | OUTPATIENT
Start: 2022-03-20 | End: 2022-03-20 | Stop reason: HOSPADM

## 2022-03-20 RX ORDER — PROPOFOL 10 MG/ML
INJECTION, EMULSION INTRAVENOUS PRN
Status: DISCONTINUED | OUTPATIENT
Start: 2022-03-20 | End: 2022-03-20

## 2022-03-20 RX ORDER — FENTANYL CITRATE 0.05 MG/ML
50 INJECTION, SOLUTION INTRAMUSCULAR; INTRAVENOUS EVERY 5 MIN PRN
Status: DISCONTINUED | OUTPATIENT
Start: 2022-03-20 | End: 2022-03-20 | Stop reason: HOSPADM

## 2022-03-20 RX ORDER — DEXAMETHASONE SODIUM PHOSPHATE 4 MG/ML
INJECTION, SOLUTION INTRA-ARTICULAR; INTRALESIONAL; INTRAMUSCULAR; INTRAVENOUS; SOFT TISSUE PRN
Status: DISCONTINUED | OUTPATIENT
Start: 2022-03-20 | End: 2022-03-20

## 2022-03-20 RX ORDER — CLINDAMYCIN PHOSPHATE 900 MG/50ML
900 INJECTION, SOLUTION INTRAVENOUS SEE ADMIN INSTRUCTIONS
Status: DISCONTINUED | OUTPATIENT
Start: 2022-03-20 | End: 2022-03-20 | Stop reason: HOSPADM

## 2022-03-20 RX ORDER — CETIRIZINE HYDROCHLORIDE 10 MG/1
10 TABLET ORAL DAILY
Status: DISCONTINUED | OUTPATIENT
Start: 2022-03-20 | End: 2022-03-22 | Stop reason: HOSPADM

## 2022-03-20 RX ORDER — BISACODYL 10 MG
10 SUPPOSITORY, RECTAL RECTAL DAILY PRN
Status: DISCONTINUED | OUTPATIENT
Start: 2022-03-20 | End: 2022-03-21

## 2022-03-20 RX ADMIN — Medication 1 LOZENGE: at 18:08

## 2022-03-20 RX ADMIN — FENTANYL CITRATE 100 MCG: 50 INJECTION, SOLUTION INTRAMUSCULAR; INTRAVENOUS at 07:41

## 2022-03-20 RX ADMIN — GLYCOPYRROLATE 0.6 MG: 0.2 INJECTION, SOLUTION INTRAMUSCULAR; INTRAVENOUS at 08:32

## 2022-03-20 RX ADMIN — SODIUM CHLORIDE, POTASSIUM CHLORIDE, SODIUM LACTATE AND CALCIUM CHLORIDE: 600; 310; 30; 20 INJECTION, SOLUTION INTRAVENOUS at 07:18

## 2022-03-20 RX ADMIN — SODIUM CHLORIDE: 9 INJECTION, SOLUTION INTRAVENOUS at 12:02

## 2022-03-20 RX ADMIN — DEXAMETHASONE SODIUM PHOSPHATE 4 MG: 4 INJECTION, SOLUTION INTRA-ARTICULAR; INTRALESIONAL; INTRAMUSCULAR; INTRAVENOUS; SOFT TISSUE at 07:58

## 2022-03-20 RX ADMIN — NEOSTIGMINE METHYLSULFATE 4 MG: 1 INJECTION, SOLUTION INTRAVENOUS at 08:32

## 2022-03-20 RX ADMIN — ACETAMINOPHEN 650 MG: 325 TABLET, FILM COATED ORAL at 18:07

## 2022-03-20 RX ADMIN — CLINDAMYCIN PHOSPHATE 900 MG: 900 INJECTION, SOLUTION INTRAVENOUS at 07:18

## 2022-03-20 RX ADMIN — LIDOCAINE HYDROCHLORIDE 100 MG: 20 INJECTION, SOLUTION INFILTRATION; PERINEURAL at 07:41

## 2022-03-20 RX ADMIN — PHENYLEPHRINE HYDROCHLORIDE 100 MCG: 10 INJECTION INTRAVENOUS at 07:54

## 2022-03-20 RX ADMIN — Medication 1 LOZENGE: at 12:06

## 2022-03-20 RX ADMIN — PROPOFOL 180 MG: 10 INJECTION, EMULSION INTRAVENOUS at 07:41

## 2022-03-20 RX ADMIN — ONDANSETRON 4 MG: 2 INJECTION INTRAMUSCULAR; INTRAVENOUS at 07:58

## 2022-03-20 RX ADMIN — PHENYLEPHRINE HYDROCHLORIDE 0.3 MCG/KG/MIN: 10 INJECTION INTRAVENOUS at 08:19

## 2022-03-20 RX ADMIN — ROCURONIUM BROMIDE 50 MG: 50 INJECTION, SOLUTION INTRAVENOUS at 07:41

## 2022-03-20 RX ADMIN — PHENYLEPHRINE HYDROCHLORIDE 100 MCG: 10 INJECTION INTRAVENOUS at 08:01

## 2022-03-20 RX ADMIN — SENNOSIDES AND DOCUSATE SODIUM 1 TABLET: 50; 8.6 TABLET ORAL at 22:11

## 2022-03-20 RX ADMIN — ROCURONIUM BROMIDE 10 MG: 50 INJECTION, SOLUTION INTRAVENOUS at 08:07

## 2022-03-20 RX ADMIN — BISACODYL 10 MG: 10 SUPPOSITORY RECTAL at 00:30

## 2022-03-20 RX ADMIN — HYDROMORPHONE HYDROCHLORIDE 0.3 MG: 1 INJECTION, SOLUTION INTRAMUSCULAR; INTRAVENOUS; SUBCUTANEOUS at 03:16

## 2022-03-20 RX ADMIN — ACETAMINOPHEN 650 MG: 325 TABLET, FILM COATED ORAL at 12:01

## 2022-03-20 RX ADMIN — Medication 400 MG: at 14:08

## 2022-03-20 ASSESSMENT — ACTIVITIES OF DAILY LIVING (ADL)
ADLS_ACUITY_SCORE: 6
TRANSFERRING: 0-->INDEPENDENT
WALKING_OR_CLIMBING_STAIRS_DIFFICULTY: NO
ADLS_ACUITY_SCORE: 6
ADLS_ACUITY_SCORE: 6
DIFFICULTY_COMMUNICATING: NO
CONCENTRATING,_REMEMBERING_OR_MAKING_DECISIONS_DIFFICULTY: NO
ADLS_ACUITY_SCORE: 6
DOING_ERRANDS_INDEPENDENTLY_DIFFICULTY: NO
ADLS_ACUITY_SCORE: 6
WEAR_GLASSES_OR_BLIND: YES
ADLS_ACUITY_SCORE: 6
DRESSING/BATHING_DIFFICULTY: NO
TOILETING_ISSUES: NO
HEARING_DIFFICULTY_OR_DEAF: NO
ADLS_ACUITY_SCORE: 6
DIFFICULTY_EATING/SWALLOWING: NO
TRANSFERRING: 0-->INDEPENDENT
ADLS_ACUITY_SCORE: 6
FALL_HISTORY_WITHIN_LAST_SIX_MONTHS: NO
ADLS_ACUITY_SCORE: 6
ADLS_ACUITY_SCORE: 10
CHANGE_IN_FUNCTIONAL_STATUS_SINCE_ONSET_OF_CURRENT_ILLNESS/INJURY: NO
ADLS_ACUITY_SCORE: 6

## 2022-03-20 ASSESSMENT — ENCOUNTER SYMPTOMS: SEIZURES: 0

## 2022-03-20 NOTE — OP NOTE
Procedure Date: 2022    PREOPERATIVE DIAGNOSIS:  Right chronic subdural hematoma.    POSTOPERATIVE DIAGNOSIS:  Right chronic subdural hematoma.    PROCEDURE:  Right stacey hole for evacuation of chronic subdural hematoma.    SURGEON:  Austin Ortega MD    ANESTHESIA:  General endotracheal anesthesia plus local anesthetic.    ESTIMATED BLOOD LOSS:  5 mL    INDICATIONS FOR PROCEDURE:  The patient is a 56-year-old female with a history of a left sided subdural hematoma requiring craniotomy for evacuation, who presented with worsening headaches, who had a new right sided chronic subdural hematoma with brain compression causing early uncal herniation.  Given the mass effect of the hematoma she was brought for stacey hole drainage of the subdural hematoma.    DESCRIPTION OF PROCEDURE:  The patient was brought to the operating room.  General endotracheal anesthesia was induced.  A linear incision was made over the right frontal convexity near the coronal suture and the skull exposed.  A single stacey hole was created with the .  The dura was exposed and coagulated and opened with a 15 blade.  Chronic subdural fluid exited under high pressure.  The subdural space was then irrigated copiously until clear fluid returned.  A 10-Nepali round JOSSUE was placed through a separate stab incision into the subdural space.  A piece of Gelfoam was placed over the stacey hole and the slotted stacey hole cover was used to cover the skull defect.  The scalp was closed with 2-0 inverted interrupted Vicryl and a running 3-0 nylon.  The drain was secured with a 3-0 nylon and a greyson stitch was placed as well.  Once this was done, the patient was then awakened, extubated, and taken to the recovery room in good condition.    Austin Ortega MD        D: 2022   T: 2022   MT: SPMT    Name:     MARTHA EMANUEL  MRN:      [id number]        Account:        [id number]   :      1965           Procedure Date: 2022      Document: [id number]

## 2022-03-20 NOTE — ANESTHESIA POSTPROCEDURE EVALUATION
Patient: Mary Franco    Procedure: Procedure(s):  Right stacey hole for evacuation of chronic subdural hematoma       Anesthesia Type:  General    Note:  Disposition: Inpatient   Postop Pain Control: Uneventful            Sign Out: Well controlled pain   PONV: No   Neuro/Psych: Uneventful            Sign Out: Acceptable/Baseline neuro status   Airway/Respiratory: Uneventful            Sign Out: Acceptable/Baseline resp. status   CV/Hemodynamics: Uneventful            Sign Out: Acceptable CV status   Other NRE: NONE   DID A NON-ROUTINE EVENT OCCUR? No    Event details/Postop Comments:  Stable and doing well prior to transfer to floor.               Last vitals:  Vitals Value Taken Time   /71 03/20/22 1020   Temp 36.8  C (98.3  F) 03/20/22 1010   Pulse 78 03/20/22 1025   Resp 18 03/20/22 1025   SpO2 98 % 03/20/22 1023   Vitals shown include unvalidated device data.    Electronically Signed By: Mynor Franco MD  March 20, 2022  11:12 AM

## 2022-03-20 NOTE — ANESTHESIA PROCEDURE NOTES
Airway       Patient location during procedure: OR       Procedure Start/Stop Times: 3/20/2022 7:43 AM  Staff -        CRNA: Emerita Rivera APRN CRNA       Other Anesthesia Staff: Jeanne Prasad       Performed By: ANNA  Consent for Airway        Urgency: elective  Indications and Patient Condition       Indications for airway management: adonay-procedural       Induction type:intravenous       Mask difficulty assessment: 2 - vent by mask + OA or adjuvant +/- NMBA    Final Airway Details       Final airway type: endotracheal airway       Successful airway: ETT - single  Endotracheal Airway Details        ETT size (mm): 7.0       Cuffed: yes       Successful intubation technique: direct laryngoscopy       DL Blade Type: Lopez 2       Grade View of Cords: 1       Adjucts: stylet       Position: Left       Measured from: gums/teeth       Secured at (cm): 22       Bite block used: None    Post intubation assessment        Placement verified by: capnometry, equal breath sounds and chest rise        Number of attempts at approach: 1       Number of other approaches attempted: 0       Secured with: pink tape       Ease of procedure: easy       Dentition: Unchanged and Intact

## 2022-03-20 NOTE — CARE PLAN
Pt here with R SDH w/ midline shift. A&Ox4. Neuros intact. VSS. Tele NSR. Regular diet, thin liquids. Takes pills whole. Up with Independent. Pain managed with Tylenol and Dilaudid. Pt scoring green on the Aggression Stop Light Tool. Plan NS running at 75mL/hr. NPO at midnight. R Jeanne hole for SDH evac at 0730. Discharge pending.

## 2022-03-20 NOTE — ANESTHESIA CARE TRANSFER NOTE
Patient: Mary Franco    Procedure: Procedure(s):  Right stacey hole for evacuation of chronic subdural hematoma       Diagnosis: Chronic subdural hematoma (H) [I62.03]  Diagnosis Additional Information: No value filed.    Anesthesia Type:   No value filed.     Note:    Oropharynx: oropharynx clear of all foreign objects and spontaneously breathing  Level of Consciousness: awake  Oxygen Supplementation: face mask  Level of Supplemental Oxygen (L/min / FiO2): 6  Independent Airway: airway patency satisfactory and stable  Dentition: dentition unchanged  Vital Signs Stable: post-procedure vital signs reviewed and stable  Report to RN Given: handoff report given  Patient transferred to: PACU  Comments: Neuromuscular blockade reversed after TOF 4/4, spontaneous respirations, adequate tidal volumes, followed commands to voice, oropharynx suctioned with soft flexible catheter, extubated atraumatically, extubated with suction, airway patent after extubation.  Oxygen via facemask at 6 liters per minute to PACU. Oxygen tubing connected to wall O2 in PACU, SpO2, NiBP, and EKG monitors and alarms on and functioning, Jet Hugger warmer connected to patient gown, report on patient's clinical status given to PACU RN, RN questions answered.     Handoff Report: Identifed the Patient, Identified the Reponsible Provider, Reviewed the pertinent medical history, Discussed the surgical course, Reviewed Intra-OP anesthesia mangement and issues during anesthesia, Set expectations for post-procedure period and Allowed opportunity for questions and acknowledgement of understanding      Vitals:  Vitals Value Taken Time   BP     Temp     Pulse 96 03/20/22 0844   Resp 23 03/20/22 0844   SpO2 97 % 03/20/22 0844   Vitals shown include unvalidated device data.    Electronically Signed By: JOSE Nina CRNA  March 20, 2022  8:45 AM   Jovani Stearns is a 81year old female.   Patient presents with:  Ear Wax: bilateral ear cleaning       HISTORY OF PRESENT ILLNESS    Patient presents for cerumen removal. No other complaints or concerns at this time    Social History    Marital Status: Mar Constitutional Normal Overall appearance - Normal.        Neck Exam Normal Inspection - Normal. Palpation - Normal. Parotid gland - Normal. Thyroid gland - Normal.             Head/Face Normal Facial features - Normal. Eyebrows - Normal. Skull - Normal. Calcium (LIPITOR) 40 MG Oral Tab, , Disp: , Rfl:   •  lisinopril (PRINIVIL,ZESTRIL) 40 MG Oral Tab, , Disp: , Rfl:   •  Metoprolol Succinate ER (TOPROL XL) 100 MG Oral Tablet 24 Hr, Take  by mouth., Disp: , Rfl:   •  Multiple Vitamins-Minerals (MULTI FOR H

## 2022-03-20 NOTE — BRIEF OP NOTE
[facility]    Brief Operative Note    Pre-operative diagnosis: Chronic subdural hematoma (H) [I62.03]  Post-operative diagnosis Same as pre-operative diagnosis    Procedure: Procedure(s):  Right stacey hole for evacuation of chronic subdural hematoma  Surgeon: Surgeon(s) and Role:     * Austin Ortega MD - Primary  Anesthesia: General   Estimated Blood Loss: Less than 10 ml    Drains: Blas-Armijo  Specimens: * No specimens in log *  Findings:   None.  Complications: None.  Implants:   Implant Name Type Inv. Item Serial No.  Lot No. LRB No. Used Action   IMP SCR SYN MATRIX LOW PRO 1.5X04MM SELF DRILL [id number] - [id number] [employer] IMP SCR SYN MATRIX LOW PRO 1.5X04MM SELF DRILL [id number]  SY[employer]ES[employer]ATEC 15MAR 2022 42 10 Right 3 Implanted   PLATE FOR SHUNT 17MM - [id number] [employer]/[place] PLATE FOR SHUNT 17MM  SYN[employer] 15MAR 2022 42 10 Right 1 Implanted     [id number]

## 2022-03-20 NOTE — PROGRESS NOTES
"[facility]    HOSPITALIST PROGRESS NOTE :   --------------------------------------------------    Date of Admission:  3/18/2022    Cumulative Summary: Mary Franco is a 56 year old female admitted on 3/18/2022. She presents to the emergency department for evaluation of persistent worsening headache similar to prior spontaneous subdural hematoma in 2015 and is found to have again a spontaneous subdural hematoma, this time right frontal parietal with 8 mm leftward subfalcine shift and early uncal herniation.    Assessment & Plan     Spontaneous right frontal subdural hematoma:   History of spontaneous subdural hematoma in 2015  Patient presented to ER for evaluation of persistent worsening headache for 1 week.  Patient is on baby aspirin daily, also took some Aleve to help her pain at home  CT scan of the head showed new mixed density right frontal convexity subdural hematoma there appears to be at least some recent acute to subacute blood products.  Patient was also noticed to have 8 mm leftward subfalcine shift with early uncal herniation and concern for left lateral ventricular entrapment.   No focal neurologic deficits have been appreciated.   Patient does have some speech thickening when she is tired and occasionally some word substitution when she is \"frazzled\" which are residual from her 2015 subdural.  Repeat CT on March 19 showed stable mixed density subdural hematoma overlying the right cerebral hemisphere with a stable 9 mm right-to-left midline shift    --Patient was seen and examined underwent right bur hole for evacuation of chronic subdural hematoma this morning by Dr. Ortega and tolerated the procedure well.  --Patient thinks that her headache is significantly improved after the procedure as compared to yesterday.  --Postoperative care as per neurosurgery  -- Continue Neuro IMC status as recommended by NS with Q2 hour neuro checks   -- Recommending to keep Systolic BP less " than 150   --Plan to discontinue IV fluids tomorrow morning  -- Hold PTA ASA,  has discussed with patient as well as son at bedside on admission my concern for restarting this.  Patient takes this medication for primary prophylaxis in the setting of type 2 diabetes, though now has had 2 spontaneous subdural hematomas.  --Start aspirin once okay with neurosurgery versus stopping it altogether per neurosurgery recommendation  -- Per NS, patient does not need Keppra or Platelet transfusion at this time   -- Patient might benefit from cerebral angiogram in future   -- Outpatient ophthalmology follow-up    Pre op optimization : patient is denying any history of essential HTN , MI,CHF or CKD  She is denying any reaction from anesthesia when she underwent intracranial surgery in 2015   -- As above ASA is stopped   -- EKG done on admission is showing NSR  -- Her Perioperative risk is 0.04% for perioperative myocardial infarction or cardiac arrest     Type 2 diabetes: Currently with hyperglycemia, glucose of 285 on arrival.  Reports she is no longer on medications for diabetes, managed with diet and exercise    -- Hemoglobin A1c came back 12.5   -- Continue with diet and exercise; patient reports that she has lost approximately 18 pounds with diet and exercise  --Medium dose sliding scale insulin every 4 hours while n.p.o.;   --  Patient was also initiated on Lantus 10 units every morning, might give patient only 5 units this morning if patient will be NPO after midnight   -- I did have discussion with patient and her son regarding the importance of going home with Lantus and oral hypoglycemic agents for meal coverage for some time   -- Once her HgA1c is improved , then they can continue to work with the PCP if her diabetes can be managed with oral hypoglycemic agents only   --As patient has also received 4 mg of Decadron during the surgery, will go ahead and will give patient 5 units of Lantus again this  "evening.    Clinically Significant Risk Factors Present on Admission        # Diabetes, type II: last A1C 12.5 % (Ref range: 0.0 - 5.6 %)  # Obesity: Estimated body mass index is 33.66 kg/m  as calculated from the following:    Height as of this encounter: 1.6 m (5' 3\").    Weight as of this encounter: 86.2 kg (190 lb).      Diet:      Nichole Catheter: Not present  DVT Prophylaxis: Pneumatic Compression Devices  Code Status: Full Code    The patient's care was discussed with the Bedside Nurse, Patient and Patient's Family.    Disposition Plan   Expected Discharge: 03/21/2022     Anticipated discharge location: home     Patient will need PT/OT assessment after the procedure for safe disposition plan, hoping patient would be able to return home   35 min were spent in direct patient care today including the floor time , more than 50% of the time was spent in patient care coordination and counseling.      Obdulia Mitchell MD, FACP  Text Page (7am - 6pm)    ----------------------------------------------------------------------------------------------------------------------    Interval History    Patient was seen and examined, son also present at the bedside along with bedside nursing, just came back from PACU underwent stacey hole procedure earlier this morning for chronic subdural hematoma, she thinks that although she has the pain at the surgical site but her headache is already significantly improved.  She is denying any new neurological weakness or sensory deficit she is denying any chest pain, shortness of breath or palpitations.    -Data reviewed today: I reviewed all new labs and imaging results over the last 24 hours.    I personally reviewed the head CT image(s) showing Subdural hematoma, EKG is showing normal sinus rhythm.    Physical Exam   Temp: 97.4  F (36.3  C) Temp src: Temporal BP: 131/84 Pulse: 78   Resp: 16 SpO2: 97 % O2 Device: None (Room air)    Vitals:    03/18/22 1931   Weight: 86.2 kg (190 lb)     Vital " Signs with Ranges  Temp:  [97.4  F (36.3  C)-98.6  F (37  C)] 97.4  F (36.3  C)  Pulse:  [78-89] 78  Resp:  [16-18] 16  BP: (102-131)/(62-84) 131/84  SpO2:  [94 %-97 %] 97 %  No intake/output data recorded.    GENERAL: Alert , awake and oriented. NAD. Conversational, appropriate.   HEENT: Normocephalic. EOMI. No icterus or injection. Nares normal.  S/p evacuation, presence of surgical drain  LUNGS: Clear to auscultation. No dyspnea at rest.   HEART: Regular rate. Extremities perfused.   ABDOMEN: Soft, nontender, and nondistended. Positive bowel sounds.   EXTREMITIES: No LE edema noted.   NEUROLOGIC: Moves extremities x4 on command. No acute focal neurologic abnormalities noted.     Medications     - MEDICATION INSTRUCTIONS -       sodium chloride 75 mL/hr at 03/19/22 2149       [Auto Hold] cetirizine  5 mg Oral Daily     [Auto Hold] insulin aspart  1-5 Units Subcutaneous At Bedtime     [Auto Hold] insulin aspart  1-7 Units Subcutaneous TID AC     [Auto Hold] insulin glargine  5 Units Subcutaneous QAM AC     [Auto Hold] sodium chloride (PF)  3 mL Intracatheter Q8H       Data   Recent Labs   Lab 03/20/22  0505 03/20/22  0301 03/19/22  2139 03/19/22  0303 03/18/22  2205 03/18/22 2020 03/18/22 2017   WBC 5.8  --   --   --   --  5.9  --    HGB 11.2*  --   --   --   --  12.5  --    MCV 82  --   --   --   --  80  --      --   --   --   --  334  --    INR  --   --   --   --  0.97  --   --      --   --   --   --   --  137   POTASSIUM 4.0  --   --   --   --   --  4.0   CHLORIDE 110*  --   --   --   --   --  105   CO2 28  --   --   --   --   --  28   BUN 18  --   --   --   --   --  18   CR 0.60  --   --   --   --   --  0.50*   ANIONGAP 2*  --   --   --   --   --  4   WINDY 9.1  --   --   --   --   --  9.4   * 210* 207*   < >  --   --  285*   ALBUMIN 3.1*  --   --   --   --   --   --    PROTTOTAL 6.5*  --   --   --   --   --   --    BILITOTAL 0.3  --   --   --   --   --   --    ALKPHOS 70  --   --   --   --    --   --    ALT 24  --   --   --   --   --   --    AST 12  --   --   --   --   --   --     < > = values in this interval not displayed.       Imaging:   No results found for this or any previous visit (from the past 24 hour(s)).

## 2022-03-20 NOTE — ANESTHESIA PREPROCEDURE EVALUATION
Anesthesia Pre-Procedure Evaluation    Patient: Mary Franco   MRN: [id number] : 1965        Procedure : Procedure(s):  Right stacey hole for evacuation of chronic subdural hematoma          Past Medical History:   Diagnosis Date     Elevated blood sugar      Obesity      Seasonal allergies      Subdural haemorrhage     Large left-sided     Uncomplicated asthma       Past Surgical History:   Procedure Laterality Date     COLONOSCOPY N/A 2017    Procedure: COMBINED COLONOSCOPY, SINGLE OR MULTIPLE BIOPSY/POLYPECTOMY BY BIOPSY;;  Surgeon: Katja Paredes MD;  Location:  GI     CRANIOPLASTY Left 2015    Procedure: CRANIOPLASTY;  Surgeon: Yovani Willson MD;  Location: [facility] OR     CRANIOTOMY Left 2015    Procedure: CRANIOTOMY;  Surgeon: Yovani Willson MD;  Location: [facility] OR     CRANIOTOMY Left 2015    Procedure: CRANIOTOMY;  Surgeon: Yovani Willson MD;  Location: [facility] OR     CRANIOTOMY Left       Allergies   Allergen Reactions     Penicillins Shortness Of Breath, Hives and Difficulty breathing     Vicodin [Hydrocodone-Acetaminophen] Other (See Comments)     Severe headache        Social History     Tobacco Use     Smoking status: Never Smoker     Smokeless tobacco: Never Used   Substance Use Topics     Alcohol use: No     Alcohol/week: 0.0 standard drinks      Wt Readings from Last 1 Encounters:   22 86.2 kg (190 lb)        Prior to Admission medications    Medication Sig Start Date End Date Taking? Authorizing Provider   APPLE CIDER VINEGAR PO Take by mouth daily   Yes Unknown, Entered By History   aspirin 81 MG EC tablet Take 81 mg by mouth daily   Yes Unknown, Entered By History   calcium-magnesium (CALMAG) 500-250 MG TABS Take 1 tablet by mouth daily   Yes Unknown, Entered By History   cetirizine (ZYRTEC ALLERGY) 10 MG tablet Take 1 tablet (10 mg) by mouth daily 14  Yes Jennyfer Umana MD   mometasone (NASONEX) 50 MCG/ACT nasal spray  Spray 2 sprays into both nostrils daily   Yes Reported, Patient   multivitamin, therapeutic (THERA-VIT) TABS Take 1 tablet by mouth daily   Yes Unknown, Entered By History   Omega-3 Fatty Acids (OMEGA-3 FISH OIL PO) Take 1 g by mouth daily   Yes Unknown, Entered By History   VITAMIN E PO Take 1 tablet by mouth daily   Yes Unknown, Entered By History     Recent Labs   Lab Test 04/03/15  2200   ABO O   RH  Pos     Recent Labs   Lab Test 07/09/15  0605   HCG Negative     Recent Results (from the past 744 hour(s))   CT Head w/o Contrast   Result Value    Radiologist flags Acute intracranial hemorrhage (AA)    Narrative    EXAM: CT HEAD W/O CONTRAST  LOCATION: [facility]  DATE/TIME: 3/18/2022 8:57 PM    INDICATION: Headache, intracranial hemorrhage suspected.  COMPARISON: 01/13/2017.  TECHNIQUE: Routine CT Head without IV contrast. Multiplanar reformats. Dose reduction techniques were used.    FINDINGS:  INTRACRANIAL CONTENTS: There is a new mixed density right frontal convexity subdural hematoma measuring up to approximately 13 mm in radial thickness. The hematoma is predominantly hypodense, but there are smaller hyperdense components suggesting at   least some acute/subacute component. There is also a hyperdense subdural hematoma component along the superior aspect of the right tentorial leaflet measuring up to approximately 3 mm in thickness. There is significant associated mass effect with up to   approximately 8 mm of leftward midline shift at the level of the foramen of Monro (series 2 image 12). There is significant narrowing of the right lateral ventricle and third ventricle with suggestion of slight dilatation of the atrium, occipital horn,   and temporal horn of left lateral ventricle, concerning for early left lateral ventricular entrapment. Early right uncal herniation is noted (series 2 image 9) with medialization of the right uncus/mesial temporal lobe over the tentorial leaflet  and mild   narrowing of the ambient cisterns. No cerebellar tonsillar herniation seen.    No acute intraparenchymal hemorrhage is identified. No intraventricular hemorrhage seen. The cortical gray-white distinction appears to be maintained.    VISUALIZED ORBITS/SINUSES/MASTOIDS: No intraorbital abnormality. No paranasal sinus mucosal disease. No middle ear or mastoid effusion.    BONES/SOFT TISSUES: No acute abnormality. Redemonstrated wide left craniotomy changes, as before.      Impression    IMPRESSION:  1.  New mixed density right frontal convexity subdural hematoma, as described. There appear to be at least some recent (acute to subacute) blood products within the collection. There is a recent appearing right tentorial leaflet subdural hematoma.   Associated mass effect resulting in up to approximately 8 mm of leftward subfalcine shift and early right uncal herniation. Urgent Neurosurgery consultation recommended.  2.  Narrowing of the right lateral ventricle and third ventricle with slight dilatation of the left lateral ventricle, concerning for early left lateral ventricular entrapment.    Findings were discussed with ordering provider Cyndee Cantu by myself at approximately 9:14 PM on 03/18/2022.      [Critical Result: Acute intracranial hemorrhage]    Finding was identified on 3/18/2022 9:06 PM.     Cyndee Cantu  was contacted by me on 3/18/2022 9:14 PM and verbalized understanding of the critical result.    CT Head w/o Contrast    Narrative    EXAM: CT HEAD WITHOUT CONTRAST  LOCATION: [facility]  DATE/TIME: 03/19/2022, 5:40 AM    INDICATION: Cerebral hemorrhage suspected. Headache, intracranial hemorrhage suspected.  COMPARISON: 03/18/2022.  TECHNIQUE: Routine CT Head without IV contrast. Multiplanar reformats. Dose reduction techniques were used.    FINDINGS:  INTRACRANIAL CONTENTS: Stable mixed predominantly low-density subdural hemorrhage overlying the right frontal lobe.  Essentially stable mass effect with a 9 mm right-to-left midline shift. Stable subdural hematoma along the right tentorial leaflet. No CT   evidence of acute infarct. Normal parenchymal attenuation. Stable asymmetric prominence of the left lateral ventricle suggesting early trapped ventricle. There is stable right uncal herniation. There may be a small amount of subarachnoid hemorrhage   anterior to the right temporal lobe (axial image 9 series 3).     VISUALIZED ORBITS/SINUSES/MASTOIDS: No intraorbital abnormality. No paranasal sinus mucosal disease. No middle ear or mastoid effusion.    BONES/SOFT TISSUES: Old left craniotomy changes.      Impression    IMPRESSION:  1.  Stable mixed density subdural hematoma overlying the right cerebral hemisphere with a stable 9 mm right-to-left midline shift.  2.  Stable thin subdural hematoma along the right tentorial leaflet.  3.  Stable prominence of the left lateral ventricle worrisome for early entrapment.  4.  Stable mild right uncal herniation.  5.  There may be a small amount of subarachnoid hemorrhage anterior to the right uncus as detailed above.  6.  No evidence for acute infarct.         Anesthesia Evaluation   Pt has had prior anesthetic. Type: General.    No history of anesthetic complications       ROS/MED HX  ENT/Pulmonary:     (+) allergic rhinitis, Intermittent, asthma     Neurologic:    (-) no seizures and no CVA   Cardiovascular:    (-) CAD, syncope, murmur and wheezes   METS/Exercise Tolerance:     Hematologic:       Musculoskeletal:       GI/Hepatic:    (-) GERD and liver disease   Renal/Genitourinary:       Endo:     (+) type II DM, Obesity,     Psychiatric/Substance Use:       Infectious Disease:       Malignancy:       Other:            Physical Exam    Airway        Mallampati: II   TM distance: > 3 FB   Neck ROM: full   Mouth opening: > 3 cm    Respiratory Devices and Support         Dental           Cardiovascular          Rhythm and rate: regular and  normal (-) no murmur    Pulmonary           breath sounds clear to auscultation   (-) no rhonchi and no wheezes        OUTSIDE LABS:  CBC:   Lab Results   Component Value Date    WBC 5.8 03/20/2022    WBC 5.9 03/18/2022    HGB 11.2 (L) 03/20/2022    HGB 12.5 03/18/2022    HCT 36.5 03/20/2022    HCT 39.7 03/18/2022     03/20/2022     03/18/2022     BMP:   Lab Results   Component Value Date     03/20/2022     03/18/2022    POTASSIUM 4.0 03/20/2022    POTASSIUM 4.0 03/18/2022    CHLORIDE 110 (H) 03/20/2022    CHLORIDE 105 03/18/2022    CO2 28 03/20/2022    CO2 28 03/18/2022    BUN 18 03/20/2022    BUN 18 03/18/2022    CR 0.60 03/20/2022    CR 0.50 (L) 03/18/2022     (H) 03/20/2022     (H) 03/20/2022     COAGS:   Lab Results   Component Value Date    PTT 27 03/18/2022    INR 0.97 03/18/2022     POC:   Lab Results   Component Value Date     (H) 04/10/2015    HCG Negative 07/09/2015     HEPATIC:   Lab Results   Component Value Date    ALBUMIN 3.1 (L) 03/20/2022    PROTTOTAL 6.5 (L) 03/20/2022    ALT 24 03/20/2022    AST 12 03/20/2022    ALKPHOS 70 03/20/2022    BILITOTAL 0.3 03/20/2022     OTHER:   Lab Results   Component Value Date    A1C 12.5 (H) 03/18/2022    WINDY 9.1 03/20/2022    PHOS 3.1 04/14/2015    MAG 2.4 (H) 04/16/2015    TSH 0.74 07/20/2017    CRP 7.0 03/18/2022    SED 22 03/18/2022       Anesthesia Plan    ASA Status:  2   NPO Status:  NPO Appropriate    Anesthesia Type: General.     - Airway: ETT   Induction: Propofol, Intravenous.   Maintenance: Balanced.        Consents    Anesthesia Plan(s) and associated risks, benefits, and realistic alternatives discussed. Questions answered and patient/representative(s) expressed understanding.    - Discussed:     - Discussed with:  Patient         Postoperative Care    Pain management: Multi-modal analgesia.   PONV prophylaxis: Ondansetron (or other 5HT-3), Dexamethasone or Solumedrol     Comments:                Mynor  Fan Franco MD

## 2022-03-21 ENCOUNTER — APPOINTMENT (OUTPATIENT)
Dept: CT IMAGING | Facility: CLINIC | Age: 57
DRG: 025 | End: 2022-03-21
Attending: PHYSICIAN ASSISTANT
Payer: COMMERCIAL

## 2022-03-21 LAB
GLUCOSE BLDC GLUCOMTR-MCNC: 182 MG/DL (ref 70–99)
GLUCOSE BLDC GLUCOMTR-MCNC: 202 MG/DL (ref 70–99)
GLUCOSE BLDC GLUCOMTR-MCNC: 291 MG/DL (ref 70–99)

## 2022-03-21 PROCEDURE — 99233 SBSQ HOSP IP/OBS HIGH 50: CPT | Performed by: INTERNAL MEDICINE

## 2022-03-21 PROCEDURE — 250N000013 HC RX MED GY IP 250 OP 250 PS 637: Performed by: INTERNAL MEDICINE

## 2022-03-21 PROCEDURE — 250N000013 HC RX MED GY IP 250 OP 250 PS 637: Performed by: PHYSICIAN ASSISTANT

## 2022-03-21 PROCEDURE — 70450 CT HEAD/BRAIN W/O DYE: CPT

## 2022-03-21 PROCEDURE — 999N000111 HC STATISTIC OT IP EVAL DEFER: Performed by: OCCUPATIONAL THERAPIST

## 2022-03-21 PROCEDURE — 250N000013 HC RX MED GY IP 250 OP 250 PS 637: Performed by: NURSE PRACTITIONER

## 2022-03-21 PROCEDURE — 120N000001 HC R&B MED SURG/OB

## 2022-03-21 RX ORDER — NICOTINE POLACRILEX 4 MG
15-30 LOZENGE BUCCAL
Status: DISCONTINUED | OUTPATIENT
Start: 2022-03-21 | End: 2022-03-21

## 2022-03-21 RX ORDER — OXYCODONE HYDROCHLORIDE 5 MG/1
5 TABLET ORAL EVERY 4 HOURS PRN
Status: DISCONTINUED | OUTPATIENT
Start: 2022-03-21 | End: 2022-03-22 | Stop reason: HOSPADM

## 2022-03-21 RX ORDER — CONTAINER,EMPTY
EACH MISCELLANEOUS
Qty: 1 EACH | Refills: 0 | Status: SHIPPED | OUTPATIENT
Start: 2022-03-21

## 2022-03-21 RX ORDER — BLOOD PRESSURE TEST KIT
KIT MISCELLANEOUS
Qty: 100 EACH | Refills: 0 | Status: SHIPPED | OUTPATIENT
Start: 2022-03-21

## 2022-03-21 RX ORDER — DEXTROSE MONOHYDRATE 25 G/50ML
25-50 INJECTION, SOLUTION INTRAVENOUS
Status: DISCONTINUED | OUTPATIENT
Start: 2022-03-21 | End: 2022-03-21

## 2022-03-21 RX ADMIN — FLUTICASONE PROPIONATE 2 SPRAY: 50 SPRAY, METERED NASAL at 07:33

## 2022-03-21 RX ADMIN — ACETAMINOPHEN 650 MG: 325 TABLET, FILM COATED ORAL at 05:43

## 2022-03-21 RX ADMIN — OXYCODONE HYDROCHLORIDE 5 MG: 5 TABLET ORAL at 21:39

## 2022-03-21 RX ADMIN — CETIRIZINE HYDROCHLORIDE 10 MG: 10 TABLET, FILM COATED ORAL at 07:34

## 2022-03-21 RX ADMIN — SENNOSIDES AND DOCUSATE SODIUM 1 TABLET: 50; 8.6 TABLET ORAL at 07:33

## 2022-03-21 RX ADMIN — ACETAMINOPHEN 650 MG: 325 TABLET, FILM COATED ORAL at 00:21

## 2022-03-21 RX ADMIN — CALCIUM 500 MG: 500 TABLET ORAL at 10:34

## 2022-03-21 RX ADMIN — POLYETHYLENE GLYCOL 3350 17 G: 17 POWDER, FOR SOLUTION ORAL at 07:34

## 2022-03-21 RX ADMIN — SENNOSIDES AND DOCUSATE SODIUM 1 TABLET: 50; 8.6 TABLET ORAL at 21:39

## 2022-03-21 RX ADMIN — Medication 400 MG: at 10:34

## 2022-03-21 RX ADMIN — ACETAMINOPHEN 650 MG: 325 TABLET, FILM COATED ORAL at 15:12

## 2022-03-21 ASSESSMENT — ACTIVITIES OF DAILY LIVING (ADL)
ADLS_ACUITY_SCORE: 6

## 2022-03-21 NOTE — PLAN OF CARE
Reason for Admission: POD 1 [place] Holes    Cognitive/Mentation: A/Ox 4  Neuros/CMS: Intact   VS: stable.   Tele: NSR.  GI: BS positive,  flatus, last BM before surgery, taking bowel meds. Continent.  : . Continent.  Pulmonary: LS clear.  Pain: incisional/ Tylenol given.     Drains: JOSSUE/50cc this shift  Skin: intact except head incision  Activity: independent.  Diet: regular with thin liquids. Takes pills whole.   Discharge: to home when decrease in drain output    3-7pm Patient received her Glucometer and supplies and perfomed her own blood sugar test and gave herself insulin. Patient will continue to do own checks and give own insulin from our pens, so she can get good practice before discharge. Patient will have dietician come tomorrow morning for review of healthy food choices

## 2022-03-21 NOTE — PROGRESS NOTES
"[facility]    Medicine Progress Note - Hospitalist Service    Date of Admission:  3/18/2022    Assessment & Plan          Cumulative Summary: Mary Franco is a 56 year old female admitted on 3/18/2022. She presents to the emergency department for evaluation of persistent worsening headache similar to prior spontaneous subdural hematoma in 2015 and is found to have again a spontaneous subdural hematoma, this time right frontal parietal with 8 mm leftward subfalcine shift and early uncal herniation.      Spontaneous right frontal subdural hematoma:   History of spontaneous subdural hematoma in 2015  Patient presented to ER for evaluation of persistent worsening headache for 1 week.  Patient is on baby aspirin daily, also took some Aleve to help her pain at home  CT scan of the head showed new mixed density right frontal convexity subdural hematoma there appears to be at least some recent acute to subacute blood products.  Patient was also noticed to have 8 mm leftward subfalcine shift with early uncal herniation and concern for left lateral ventricular entrapment.   No focal neurologic deficits have been appreciated.   Patient does have some speech thickening when she is tired and occasionally some word substitution when she is \"frazzled\" which are residual from her 2015 subdural.  Repeat CT on March 19 showed stable mixed density subdural hematoma overlying the right cerebral hemisphere with a stable 9 mm right-to-left midline shift     --Patient was seen and examined underwent right bur hole for evacuation of chronic subdural hematoma 3/20 by Dr. Ortega.  Continue postprocedure cares as per neurosurgery.   Still with drain    -- Recommending to keep Systolic BP less than 150     -- Hold PTA ASA,  has discussed with patient as well as son at bedside on admission my concern for restarting this.  Patient takes this medication for primary prophylaxis in the setting of type 2 diabetes, " though now has had 2 spontaneous subdural hematomas.  --Start aspirin once okay with neurosurgery versus stopping it altogether per neurosurgery recommendation  -- Per NS, patient does not need Keppra or Platelet transfusion at this time   -- Patient might benefit from cerebral angiogram in future   -- Outpatient ophthalmology follow-up     Type 2 diabetes: Currently with hyperglycemia, glucose of 285 on arrival.  Reports she is no longer on medications for diabetes, managed with diet and exercise     -- Hemoglobin A1c 12.5   -- Continue with diet and exercise; patient reports that she has lost approximately 18 pounds with diet and exercise  --Medium dose sliding scale insulin every 4 hours while n.p.o.;   --  Patient was also initiated on Lantus 10 units every morning  Recent Labs   Lab 03/21/22  1111 03/21/22  0705 03/20/22  2101 03/20/22  1715 03/20/22  1144 03/20/22  1002   * 182* 256* 263* 229* 211*     3/21: Increase Lantus to 14 units and add carb coverage. (Goal blood glucose less than 180)  RN requested for bedside teaching for insulin    -- I did have discussion with patient and her son regarding the importance of going home with Lantus and oral hypoglycemic agents for meal coverage for some time   -- Once her HgA1c is improved , then they can continue to work with the PCP if her diabetes can be managed with oral hypoglycemic agents only        Diet: Advance Diet as Tolerated: Regular Diet Adult    DVT Prophylaxis: Pneumatic Compression Devices  Nichole Catheter: Not present  Central Lines: None  Cardiac Monitoring: ACTIVE order. Indication: Stroke, acute (48 hours)  Code Status: Full Code      Disposition Plan   Expected Discharge: 03/22/2022     Anticipated discharge location: home    Delays:         The patient's care was discussed with the Bedside Nurse and Patient.    Jm Londono MD, MD  Hospitalist Service  [facility]  Securely message with the Vocera Web Console  "(learn more here)  Text page via [facility] Paging/Directory     \"This dictation was performed with voice recognition software and may contain errors,  omissions and inadvertent word substitution.\"     Clinically Significant Risk Factors Present on Admission             # Diabetes, type II: last A1C 12.5 % (Ref range: 0.0 - 5.6 %)  # Obesity: Estimated body mass index is 33.66 kg/m  as calculated from the following:    Height as of this encounter: 1.6 m (5' 3\").    Weight as of this encounter: 86.2 kg (190 lb).      ______________________________________________________________________    Interval History   Feels better.  Minimal pain in the surgical site.  Denies any blurring of vision/nausea/vomiting.   4 point ROS done and negative unless mentioned     Data reviewed today: I reviewed all medications, new labs and imaging results over the last 24 hours. I personally reviewed the head CT image(s) showing as below.    Physical Exam   /77 (BP Location: Right arm)   Pulse 80   Temp (P) 98  F (36.7  C) (Oral)   Resp 16   Ht 1.6 m (5' 3\")   Wt 86.2 kg (190 lb)   SpO2 94%   BMI 33.66 kg/m    Gen- pleasant lying in bed  HEENT- RICHMOND. Drain in place   Neck- supple, no JVD elevation, no thyromegaly  CVS- I+II+ no m/r/g  RS- CTAB  Abdo- soft, no tenderness . No g/r/r  Ext- no edema   CNS- AOx3      Data    BMPRecent Labs   Lab 03/21/22  1111 03/21/22  0705 03/20/22  2101 03/20/22  1715 03/20/22  1002 03/20/22  0505 03/19/22  0303 03/18/22 2017   NA  --   --   --   --   --  140  --  137   POTASSIUM  --   --   --   --   --  4.0  --  4.0   CHLORIDE  --   --   --   --   --  110*  --  105   WINDY  --   --   --   --   --  9.1  --  9.4   CO2  --   --   --   --   --  28  --  28   BUN  --   --   --   --   --  18  --  18   CR  --   --   --   --   --  0.60  --  0.50*   * 182* 256* 263*   < > 224*   < > 285*    < > = values in this interval not displayed.     CBC  Recent Labs   Lab 03/20/22  0505 03/18/22 2020   WBC 5.8 " 5.9   RBC 4.43 4.99   HGB 11.2* 12.5   HCT 36.5 39.7   MCV 82 80   MCH 25.3* 25.1*   MCHC 30.7* 31.5   RDW 16.0* 15.7*    334     INR  Recent Labs   Lab 03/18/22  2205   INR 0.97     LFTs  Recent Labs   Lab 03/20/22  0505   ALKPHOS 70   AST 12   ALT 24   BILITOTAL 0.3   PROTTOTAL 6.5*   ALBUMIN 3.1*      PANCNo lab results found in last 7 days.      Recent Results (from the past 24 hour(s))   CT Head w/o Contrast    Narrative    EXAM: CT HEAD W/O CONTRAST  LOCATION: [facility]  DATE/TIME: 3/21/2022 5:50 AM    INDICATION: Subdural hematoma.  COMPARISON: 03/19/2022.  TECHNIQUE: Routine CT Head without IV contrast. Multiplanar reformats. Dose reduction techniques were used.    FINDINGS:  INTRACRANIAL CONTENTS: Interval postsurgical changes relating to drainage of previously demonstrated mixed attenuation right-sided subdural hematoma. There is a new subdural drain in place entering through a right frontal stacey hole. There is a small   residual right-sided subdural collection significantly decreased in size with maximal thickness of 2 mm in the posterior temporal region. There is a persistent thin subdural component along the right tentorial leaflet. Significantly improved intracranial   mass effect with mild residual effacement of the right lateral ventricle and persistent 3 mm of right to left shift of midline structures compared with 9 mm on the preoperative study. No evidence of interval acute intracranial hemorrhage or infarct.     VISUALIZED ORBITS/SINUSES/MASTOIDS: No intraorbital abnormality. No paranasal sinus mucosal disease. No middle ear or mastoid effusion.    BONES/SOFT TISSUES: Left-sided cranioplasty. Right frontal stacey hole.      Impression    IMPRESSION:  1.  Interval postsurgical changes of right-sided subdural hematoma drainage. No postprocedure complication is identified.  2.  There is a persistent but significantly smaller right-sided subdural collection.  3.   Decreased intracranial mass effect.

## 2022-03-21 NOTE — PLAN OF CARE
Goal Outcome Evaluation:  VSS. Neuros intact. Tele reading NSR. Incisional pain rated 3-5/10, taking Tylenol and using ice packs. JOSSUE with 65 mL serosanguinous output. Incision DARIAN, WDL with intact incisions.  Voiding without difficulty. BS present, passing flatus and advanced to regular diet. Taking pills whole with water. Scoring green on aggression screening tool.

## 2022-03-21 NOTE — PLAN OF CARE
Physical Therapy: Orders received. Chart reviewed and discussed with care team.? Physical Therapy not indicated due to patient being independent with functional mobility with plan to discharge home.? Defer discharge recommendations to OT and medical team.? Will complete orders.

## 2022-03-21 NOTE — PROGRESS NOTES
"[facility]    Neurosurgery Progress Note    Date of Service (when I saw the patient): 03/21/2022     Assessment & Plan     Procedure(s):  Right stacey hole for evacuation of chronic subdural hematoma   -1 Day Post-Op  Doing well. Some incisional discomfort and pressure on the left temple. No dizziness, nausea/vomiting, vision changes, or weakness. JPx1 with 70 out overnight.     Plan:  -Continue JOSSUE drain for now  -Continue therapies  -Pain management as needed  -Appreciate assistance from other services     I have discussed the following assessment and plan Dr. Ortega who is in agreement with initial plan and will follow up with further consultation recommendations.    Brynn Alvarez, BARBARA  [facility] Neurosurgery  [employer]  [address]  [address]  [place] [address]    Tel [phone]  Pager [phone]      Interval History   Stable.    Physical Exam   Temp: (P) 98  F (36.7  C) Temp src: (P) Oral BP: 139/74 Pulse: 92   Resp: 16 SpO2: 97 % O2 Device: None (Room air) Oxygen Delivery: 1 LPM  Vitals:    03/18/22 1931   Weight: 190 lb (86.2 kg)     Vital Signs with Ranges  Temp:  [98  F (36.7  C)-99.2  F (37.3  C)] (P) 98  F (36.7  C)  Pulse:  [70-99] 92  Resp:  [16-20] 16  BP: (100-139)/(65-90) 139/74  SpO2:  [92 %-98 %] 97 %  I/O last 3 completed shifts:  In: 1140 [P.O.:240; I.V.:900]  Out: 2651 [Urine:2400; Drains:245; Blood:6]     , Blood pressure 139/74, pulse 92, temperature (P) 98  F (36.7  C), temperature source (P) Oral, resp. rate 16, height 5' 3\" (1.6 m), weight 190 lb (86.2 kg), SpO2 97 %, not currently breastfeeding.  190 lbs 0 oz  HEENT:  Normocephalic.  PERRLA.  EOM s intact.    Heart:  No peripheral edema  Lungs:  No SOB  Skin:  Warm and dry, good capillary refill.  Extremities:  Good radial and dorsalis pedis pulses bilaterally, no edema, cyanosis or clubbing.    NEUROLOGICAL EXAMINATION:   Mental status:  Alert and Oriented x 3, speech " is fluent.  Cranial nerves:  II-XII intact.   Motor:  Strength is 5/5 throughout the upper and lower extremities  Coordination:  Smooth finger to nose testing.   Negative pronator drift.      Medications     - MEDICATION INSTRUCTIONS -       sodium chloride 75 mL/hr at 03/20/22 1202       calcium carbonate (OS-WINDY) 500 mg (elemental) tablet  500 mg Oral Daily    And     magnesium oxide  400 mg Oral Daily     cetirizine  10 mg Oral Daily     fluticasone  2 spray Both Nostrils Daily     insulin aspart  1-5 Units Subcutaneous At Bedtime     insulin aspart  1-7 Units Subcutaneous TID AC     insulin glargine  10 Units Subcutaneous QAM AC     polyethylene glycol  17 g Oral Daily     senna-docusate  1 tablet Oral BID     sodium chloride (PF)  3 mL Intracatheter Q8H       Data     CBC RESULTS:   Recent Labs   Lab Test 03/20/22  0505   WBC 5.8   RBC 4.43   HGB 11.2*   HCT 36.5   MCV 82   MCH 25.3*   MCHC 30.7*   RDW 16.0*        Basic Metabolic Panel:  Lab Results   Component Value Date     03/20/2022     07/17/2017      Lab Results   Component Value Date    POTASSIUM 4.0 03/20/2022    POTASSIUM 3.9 07/17/2017     Lab Results   Component Value Date    CHLORIDE 110 03/20/2022    CHLORIDE 102 07/17/2017     Lab Results   Component Value Date    WINDY 9.1 03/20/2022    WINDY 9.0 07/17/2017     Lab Results   Component Value Date    CO2 28 03/20/2022    CO2 30 07/17/2017     Lab Results   Component Value Date    BUN 18 03/20/2022    BUN 12 07/17/2017     Lab Results   Component Value Date    CR 0.60 03/20/2022    CR 0.65 07/17/2017     Lab Results   Component Value Date     03/21/2022     03/20/2022     07/17/2017     INR:  Lab Results   Component Value Date    INR 0.97 03/18/2022    INR 1.03 04/03/2015

## 2022-03-21 NOTE — CARE PLAN
Occupational Therapy: Orders received. Chart reviewed and discussed with care team and patient.  Patient evaluated by OT on 3/19/22 and found to be independent with functional mobility and I/ADLs, no further therapy recommended. OT was re-consulted following R stacey hole evacuation for chronic subdural hematoma. Patient now POD # 1, continues to be independent, no balance or cognitive deficits noted. Educated patient on craniotomy precautions and issued handout, patient is familiar with precautions. Formal OT evaluation not indicated due to no acute needs, continue to recommend patient can discharge home with no further therapy.  Will complete orders.

## 2022-03-21 NOTE — PLAN OF CARE
POD1 of R side burrholes of SDH. A&Ox4. Neuros intact. VSS. Incision/suture CDI and DARINA. JOSSUE. VSS. Tele NSR. Regular diet. Up with SBA. Voiding adequately. No BM, passing flatus. C/o mild incision pain managed with PRN tylenol. Pt scoring green on the aggression stoplight tool. Possible discharge today, will continue to monitor.

## 2022-03-22 VITALS
RESPIRATION RATE: 18 BRPM | DIASTOLIC BLOOD PRESSURE: 75 MMHG | BODY MASS INDEX: 33.66 KG/M2 | HEART RATE: 74 BPM | WEIGHT: 190 LBS | OXYGEN SATURATION: 97 % | TEMPERATURE: 98.2 F | HEIGHT: 63 IN | SYSTOLIC BLOOD PRESSURE: 116 MMHG

## 2022-03-22 LAB
ANION GAP SERPL CALCULATED.3IONS-SCNC: 5 MMOL/L (ref 3–14)
BUN SERPL-MCNC: 12 MG/DL (ref 7–30)
CALCIUM SERPL-MCNC: 9 MG/DL (ref 8.5–10.1)
CHLORIDE BLD-SCNC: 104 MMOL/L (ref 94–109)
CO2 SERPL-SCNC: 30 MMOL/L (ref 20–32)
CREAT SERPL-MCNC: 0.65 MG/DL (ref 0.52–1.04)
ERYTHROCYTE [DISTWIDTH] IN BLOOD BY AUTOMATED COUNT: 16.3 % (ref 10–15)
GFR SERPL CREATININE-BSD FRML MDRD: >90 ML/MIN/1.73M2
GLUCOSE BLD-MCNC: 184 MG/DL (ref 70–99)
GLUCOSE BLDC GLUCOMTR-MCNC: 123 MG/DL (ref 70–99)
GLUCOSE BLDC GLUCOMTR-MCNC: 161 MG/DL (ref 70–99)
HCT VFR BLD AUTO: 38.7 % (ref 35–47)
HGB BLD-MCNC: 11.7 G/DL (ref 11.7–15.7)
MCH RBC QN AUTO: 24.9 PG (ref 26.5–33)
MCHC RBC AUTO-ENTMCNC: 30.2 G/DL (ref 31.5–36.5)
MCV RBC AUTO: 83 FL (ref 78–100)
PLATELET # BLD AUTO: 339 10E3/UL (ref 150–450)
POTASSIUM BLD-SCNC: 3.4 MMOL/L (ref 3.4–5.3)
RBC # BLD AUTO: 4.69 10E6/UL (ref 3.8–5.2)
SODIUM SERPL-SCNC: 139 MMOL/L (ref 133–144)
WBC # BLD AUTO: 7.2 10E3/UL (ref 4–11)

## 2022-03-22 PROCEDURE — 36415 COLL VENOUS BLD VENIPUNCTURE: CPT | Performed by: INTERNAL MEDICINE

## 2022-03-22 PROCEDURE — 250N000013 HC RX MED GY IP 250 OP 250 PS 637: Performed by: INTERNAL MEDICINE

## 2022-03-22 PROCEDURE — 80048 BASIC METABOLIC PNL TOTAL CA: CPT | Performed by: INTERNAL MEDICINE

## 2022-03-22 PROCEDURE — 85027 COMPLETE CBC AUTOMATED: CPT | Performed by: INTERNAL MEDICINE

## 2022-03-22 PROCEDURE — 99239 HOSP IP/OBS DSCHRG MGMT >30: CPT | Performed by: INTERNAL MEDICINE

## 2022-03-22 PROCEDURE — 250N000013 HC RX MED GY IP 250 OP 250 PS 637: Performed by: PHYSICIAN ASSISTANT

## 2022-03-22 PROCEDURE — 250N000013 HC RX MED GY IP 250 OP 250 PS 637: Performed by: NURSE PRACTITIONER

## 2022-03-22 RX ORDER — ASPIRIN 81 MG/1
81 TABLET ORAL DAILY
DISCHARGE
Start: 2022-04-26 | End: 2022-04-26

## 2022-03-22 RX ORDER — AMOXICILLIN 250 MG
1 CAPSULE ORAL 2 TIMES DAILY PRN
Qty: 30 TABLET | Refills: 1 | Status: SHIPPED | OUTPATIENT
Start: 2022-03-22 | End: 2022-04-28

## 2022-03-22 RX ORDER — OXYCODONE HYDROCHLORIDE 5 MG/1
5 TABLET ORAL EVERY 4 HOURS PRN
Qty: 20 TABLET | Refills: 0 | Status: SHIPPED | OUTPATIENT
Start: 2022-03-22 | End: 2022-04-28

## 2022-03-22 RX ADMIN — SENNOSIDES AND DOCUSATE SODIUM 1 TABLET: 50; 8.6 TABLET ORAL at 09:18

## 2022-03-22 RX ADMIN — OXYCODONE HYDROCHLORIDE 5 MG: 5 TABLET ORAL at 01:22

## 2022-03-22 RX ADMIN — CETIRIZINE HYDROCHLORIDE 10 MG: 10 TABLET, FILM COATED ORAL at 09:18

## 2022-03-22 RX ADMIN — FLUTICASONE PROPIONATE 2 SPRAY: 50 SPRAY, METERED NASAL at 09:16

## 2022-03-22 RX ADMIN — POLYETHYLENE GLYCOL 3350 17 G: 17 POWDER, FOR SOLUTION ORAL at 09:16

## 2022-03-22 RX ADMIN — Medication 400 MG: at 09:18

## 2022-03-22 RX ADMIN — CALCIUM 500 MG: 500 TABLET ORAL at 09:18

## 2022-03-22 ASSESSMENT — ACTIVITIES OF DAILY LIVING (ADL)
ADLS_ACUITY_SCORE: 6

## 2022-03-22 NOTE — PROGRESS NOTES
Received request to see pt for diet questions  Visited with pt this morning  Pt wondering about CHOs, dosing insulin and label reading  Pt eats fresh fruits/veggies, whole grains  Has been making her own almond milk  Likes to have avocado on wheat toast  States that she doesn't buy many packaged foods  We reviewed label reading  Recommendation for 45-60 gm CHO/meal  Encouraged pt to have protein with meals  Current insulin dosing is 1 unit per 15 gm CHO  Questions answered, handouts provided    Shanti Christina RD, LD  Clinical Dietitian - [facility]   Pager - [phone]

## 2022-03-22 NOTE — PROGRESS NOTES
"[facility]    Medicine Progress Note - Hospitalist Service    Date of Admission:  3/18/2022    Assessment & Plan          Cumulative Summary: Mary Franco is a 56 year old female admitted on 3/18/2022. She presents to the emergency department for evaluation of persistent worsening headache similar to prior spontaneous subdural hematoma in 2015 and is found to have again a spontaneous subdural hematoma, this time right frontal parietal with 8 mm leftward subfalcine shift and early uncal herniation.      Spontaneous right frontal subdural hematoma:   History of spontaneous subdural hematoma in 2015  Patient presented to ER for evaluation of persistent worsening headache for 1 week.  Patient is on baby aspirin daily, also took some Aleve to help her pain at home  CT scan of the head showed new mixed density right frontal convexity subdural hematoma there appears to be at least some recent acute to subacute blood products.  Patient was also noticed to have 8 mm leftward subfalcine shift with early uncal herniation and concern for left lateral ventricular entrapment.   No focal neurologic deficits have been appreciated.   Patient does have some speech thickening when she is tired and occasionally some word substitution when she is \"frazzled\" which are residual from her 2015 subdural.  Repeat CT on March 19 showed stable mixed density subdural hematoma overlying the right cerebral hemisphere with a stable 9 mm right-to-left midline shift     --Patient was seen and examined underwent right bur hole for evacuation of chronic subdural hematoma 3/20 by Dr. Ortega.  Continue postprocedure cares as per neurosurgery.   Still with drain    -- Recommending to keep Systolic BP less than 150     -- Hold PTA ASA,  has discussed with patient as well as son at bedside on admission my concern for restarting this.  Patient takes this medication for primary prophylaxis in the setting of type 2 diabetes, " "though now has had 2 spontaneous subdural hematomas.  --Start aspirin once okay with neurosurgery versus stopping it altogether per neurosurgery recommendation  -- Per NS, patient does not need Keppra or Platelet transfusion at this time   -- Patient might benefit from cerebral angiogram in future   -- Outpatient ophthalmology follow-up     Type 2 diabetes: Currently with hyperglycemia, glucose of 285 on arrival.  Reports she is no longer on medications for diabetes, managed with diet and exercise     -- Hemoglobin A1c 12.5   -- Continue with diet and exercise; patient reports that she has lost approximately 18 pounds with diet and exercise  --Medium dose sliding scale insulin every 4 hours while n.p.o.;   --  Patient was also initiated on Lantus 10 units every morning  Recent Labs   Lab 03/22/22  1146 03/22/22  0759 03/22/22  0556 03/21/22  2130 03/21/22  1111 03/21/22  0705   * 184* 123* 202* 291* 182*     3/22: Increase Lantus to 14 units and add carb coverage. (Goal blood glucose less than 180)  RN requested for bedside teaching for insulin  I reconciled discharge medications and sent prescriptions for insulin to the pharmacy downstairs  -- Once her HgA1c is improved , then they can continue to work with the PCP if her diabetes can be managed with oral hypoglycemic agents only        Diet: Advance Diet as Tolerated: Regular Diet Adult  Diet    DVT Prophylaxis: Pneumatic Compression Devices  Nichole Catheter: Not present  Central Lines: None  Cardiac Monitoring: None  Code Status: Full Code      Disposition Plan   Expected Discharge: 03/22/2022     Anticipated discharge location: home    Delays:         The patient's care was discussed with the Bedside Nurse and Patient.    Jm Londono MD, MD  Hospitalist Service  [facility]  Securely message with the Vocera Web Console (learn more here)  Text page via [employer] Paging/Directory     \"This dictation was performed with voice " "recognition software and may contain errors,  omissions and inadvertent word substitution.\"     Clinically Significant Risk Factors Present on Admission             # Diabetes, type II: last A1C 12.5 % (Ref range: 0.0 - 5.6 %)  # Obesity: Estimated body mass index is 33.66 kg/m  as calculated from the following:    Height as of this encounter: 1.6 m (5' 3\").    Weight as of this encounter: 86.2 kg (190 lb).      ______________________________________________________________________    Interval History   No new concerns.  Denies any blurring of vision/nausea/vomiting.   4 point ROS done and negative unless mentioned     Data reviewed today: I reviewed all medications, new labs and imaging results over the last 24 hours. I personally reviewed the head CT image(s) showing as below.    Physical Exam   /75 (BP Location: Right arm)   Pulse 74   Temp 98.2  F (36.8  C) (Oral)   Resp 18   Ht 1.6 m (5' 3\")   Wt 86.2 kg (190 lb)   SpO2 97%   BMI 33.66 kg/m    Gen- pleasant lying in bed  HEENT- RICHMOND. Drain in place   Neck- supple, no JVD elevation, no thyromegaly  CVS- I+II+ no m/r/g  RS- CTAB  Abdo- soft, no tenderness . No g/r/r  Ext- no edema   CNS- AOx3      Data    BMP  Recent Labs   Lab 03/22/22  1146 03/22/22  0759 03/22/22  0556 03/21/22  2130 03/20/22  1002 03/20/22  0505 03/19/22  0303 03/18/22 2017   NA  --  139  --   --   --  140  --  137   POTASSIUM  --  3.4  --   --   --  4.0  --  4.0   CHLORIDE  --  104  --   --   --  110*  --  105   WINDY  --  9.0  --   --   --  9.1  --  9.4   CO2  --  30  --   --   --  28  --  28   BUN  --  12  --   --   --  18  --  18   CR  --  0.65  --   --   --  0.60  --  0.50*   * 184* 123* 202*   < > 224*   < > 285*    < > = values in this interval not displayed.     CBC  Recent Labs   Lab 03/22/22  0759 03/20/22  0505 03/18/22 2020   WBC 7.2 5.8 5.9   RBC 4.69 4.43 4.99   HGB 11.7 11.2* 12.5   HCT 38.7 36.5 39.7   MCV 83 82 80   MCH 24.9* 25.3* 25.1*   MCHC 30.2* " 30.7* 31.5   RDW 16.3* 16.0* 15.7*    296 334     INR  Recent Labs   Lab 03/18/22  2205   INR 0.97     LFTs  Recent Labs   Lab 03/20/22  0505   ALKPHOS 70   AST 12   ALT 24   BILITOTAL 0.3   PROTTOTAL 6.5*   ALBUMIN 3.1*

## 2022-03-22 NOTE — PROGRESS NOTES
[facility]    Neurosurgery  Daily Post-Op Note    Assessment & Plan   Procedure(s):  Right stacey hole for evacuation of chronic subdural hematoma   2 Days Post-Op  Doing well.  Pain well-controlled.    Plan:  -Advance activity as tolerated  -Continue supportive and symptomatic treatment  -JOSSUE out, discharge orders in. Follow-up in one month with new head CT prior    Kevin Zamora    Interval History   Stable.  Doing well.  Improving slowly.  Pain is reasonably controlled.  No fevers.     Physical Exam   Temp: 98.2  F (36.8  C) Temp src: Oral BP: 116/75 Pulse: 74   Resp: 18 SpO2: 97 % O2 Device: None (Room air)    Vitals:    03/18/22 1931   Weight: 86.2 kg (190 lb)     Vital Signs with Ranges  Temp:  [98.2  F (36.8  C)-98.6  F (37  C)] 98.2  F (36.8  C)  Pulse:  [74-85] 74  Resp:  [16-18] 18  BP: (116-122)/(74-77) 116/75  SpO2:  [94 %-97 %] 97 %  I/O last 3 completed shifts:  In: 900 [P.O.:900]  Out: 75 [Drains:75]    Alert and oriented.  Moves all extremities equally.    Incision: CDI      Medications     - MEDICATION INSTRUCTIONS -       sodium chloride Stopped (03/22/22 0924)        calcium carbonate (OS-WINDY) 500 mg (elemental) tablet  500 mg Oral Daily    And     magnesium oxide  400 mg Oral Daily     cetirizine  10 mg Oral Daily     fluticasone  2 spray Both Nostrils Daily     insulin aspart   Subcutaneous Daily with breakfast     insulin aspart  1-5 Units Subcutaneous At Bedtime     insulin aspart  1-7 Units Subcutaneous TID AC     insulin glargine  14 Units Subcutaneous QAM AC     polyethylene glycol  17 g Oral Daily     senna-docusate  1 tablet Oral BID     sodium chloride (PF)  3 mL Intracatheter Q8H           Keivn Zamora PA-C  [facility] Neurosurgery  [address]  [address]  [place] [address]    Tel [phone]  Pager [phone]

## 2022-03-23 ENCOUNTER — TELEPHONE (OUTPATIENT)
Dept: EDUCATION SERVICES | Facility: CLINIC | Age: 57
End: 2022-03-23
Payer: COMMERCIAL

## 2022-03-23 ENCOUNTER — PATIENT OUTREACH (OUTPATIENT)
Dept: CARE COORDINATION | Facility: CLINIC | Age: 57
End: 2022-03-23
Payer: COMMERCIAL

## 2022-03-23 DIAGNOSIS — Z71.89 OTHER SPECIFIED COUNSELING: ICD-10-CM

## 2022-03-23 NOTE — TELEPHONE ENCOUNTER
Amanda,    Looks like pt was started on insulin in the hospital and that is great! Here are 2 options that educators have given approval to use:  Padmini Pineda- Tuesday March 29th at 12:30pm (spot saved to help triage)  Magdalene Real- Thursday March 31st at 10:15am (held for urgent)    Both options would be virtual for the patient. I would recommend keeping her appt on 4/13 as well.    Thanks!    Roseanne Quach RN, [facility]

## 2022-03-23 NOTE — TELEPHONE ENCOUNTER
Diabetes Education Scheduling Outreach #1:    Call to patient to schedule. She is scheduled for first available appointment on 04/13. She has an A1C of 12.50. If there are any sooner openings, please advise.    Amanda Frias OnCall  Diabetes and Nutrition Scheduling

## 2022-03-23 NOTE — PROGRESS NOTES
Clinic Care Coordination Contact  [facility]/Voicemail       Clinical Data: Care Coordinator Outreach  Outreach attempted x 1.  Left message on patient's voicemail with call back information and requested return call.  Plan: Care Coordinator will try to reach patient again in 1-2 business days.    Jigna Lu  Community Health Worker  [facility]  [phone]

## 2022-03-23 NOTE — TELEPHONE ENCOUNTER
I've added patient to Padmini's schedule on 03/29 at 1230pm. Thank you!    Amanda SAGASTUME  Central Scheduler

## 2022-03-24 NOTE — PROGRESS NOTES
Clinic Care Coordination Contact  [facility]/Voicemail       Clinical Data: Care Coordinator Outreach  Outreach attempted x 2.  Left message on patient's voicemail with call back information and requested return call.  Plan: Care Coordinator will do no further outreaches at this time.    Jigna Lu  Community Health Worker  [facility]  [phone]

## 2022-03-28 ENCOUNTER — IMMUNIZATION (OUTPATIENT)
Dept: NURSING | Facility: CLINIC | Age: 57
End: 2022-03-28
Payer: COMMERCIAL

## 2022-03-28 PROCEDURE — 91305 COVID-19,PF,PFIZER (12+ YRS): CPT

## 2022-03-28 PROCEDURE — 0054A COVID-19,PF,PFIZER (12+ YRS): CPT

## 2022-04-04 ENCOUNTER — OFFICE VISIT (OUTPATIENT)
Dept: NEUROSURGERY | Facility: CLINIC | Age: 57
End: 2022-04-04
Payer: COMMERCIAL

## 2022-04-04 VITALS
BODY MASS INDEX: 33.66 KG/M2 | WEIGHT: 190 LBS | DIASTOLIC BLOOD PRESSURE: 75 MMHG | OXYGEN SATURATION: 98 % | SYSTOLIC BLOOD PRESSURE: 115 MMHG | HEART RATE: 86 BPM | HEIGHT: 63 IN

## 2022-04-04 DIAGNOSIS — Z98.890 HISTORY OF BURR HOLE SURGERY: ICD-10-CM

## 2022-04-04 DIAGNOSIS — S06.5XAA SUBDURAL HEMATOMA (H): Primary | ICD-10-CM

## 2022-04-04 PROCEDURE — 99024 POSTOP FOLLOW-UP VISIT: CPT | Performed by: PHYSICIAN ASSISTANT

## 2022-04-04 ASSESSMENT — PAIN SCALES - GENERAL: PAINLEVEL: NO PAIN (0)

## 2022-04-04 NOTE — NURSING NOTE
"Mary Franco is a 56 year old female who presents for:  Chief Complaint   Patient presents with     Neurologic Problem     2 week folow up:        Initial Vitals:  /75   Pulse 86   Ht 5' 3\" (1.6 m)   Wt 190 lb (86.2 kg)   SpO2 98%   BMI 33.66 kg/m   Estimated body mass index is 33.66 kg/m  as calculated from the following:    Height as of this encounter: 5' 3\" (1.6 m).    Weight as of this encounter: 190 lb (86.2 kg).. Body surface area is 1.96 meters squared. BP completed using cuff size: regular    Nursing Comments:     Cristiane Barros=s  "

## 2022-04-04 NOTE — PROGRESS NOTES
Neurosurgery 2-week follow-up    Ms. Franco is a 56-year-old female who is 2 weeks status post right bur hole for subdural hematoma.  She states her headaches are much better and mostly resolved, she has some tenderness over the incision, and some difficulty sleeping at night because it is uncomfortable in her sleep on the left side of her head due to prior surgery on that side for review of subdural, and now is uncomfortable on the right side of her head.  She denies any issues with her incision.  Overall she feels like she is moving the right direction    Exam     Alert and oriented no acute distress  Moving all extremities appropriately  Gait is normal  Negative pronator drift  Finger-nose slow and accurate  Extraocular movements intact    Assessment    Status post right bur hole for drainage of subdural hematoma    History of left subdural hematoma status post craniotomy    Plan    Gradually increase activity as tolerated.   Follow up in 2 weeks with a head CT scan prior.

## 2022-04-04 NOTE — LETTER
4/4/2022         RE: Mary Franco  [address]  [place] [address]        Dear Colleague,    Thank you for referring your patient, Mary Franco, to the [facility]. Please see a copy of my visit note below.    Neurosurgery 2-week follow-up    Ms. Franco is a 56-year-old female who is 2 weeks status post right bur hole for subdural hematoma.  She states her headaches are much better and mostly resolved, she has some tenderness over the incision, and some difficulty sleeping at night because it is uncomfortable in her sleep on the left side of her head due to prior surgery on that side for review of subdural, and now is uncomfortable on the right side of her head.  She denies any issues with her incision.  Overall she feels like she is moving the right direction    Exam     Alert and oriented no acute distress  Moving all extremities appropriately  Gait is normal  Negative pronator drift  Finger-nose slow and accurate  Extraocular movements intact    Assessment    Status post right bur hole for drainage of subdural hematoma    History of left subdural hematoma status post craniotomy    Plan    Gradually increase activity as tolerated.   Follow up in 2 weeks with a head CT scan prior.        Again, thank you for allowing me to participate in the care of your patient.        Sincerely,        Ranjeet Araujo PA-C

## 2022-04-13 ENCOUNTER — VIRTUAL VISIT (OUTPATIENT)
Dept: EDUCATION SERVICES | Facility: CLINIC | Age: 57
End: 2022-04-13
Attending: INTERNAL MEDICINE
Payer: COMMERCIAL

## 2022-04-13 DIAGNOSIS — E11.9 TYPE 2 DIABETES MELLITUS WITHOUT COMPLICATION, WITHOUT LONG-TERM CURRENT USE OF INSULIN (H): ICD-10-CM

## 2022-04-13 PROCEDURE — G0108 DIAB MANAGE TRN  PER INDIV: HCPCS | Mod: 95 | Performed by: DIETITIAN, REGISTERED

## 2022-04-13 NOTE — Clinical Note
Zafar,  Patient is seeing you next week to establish care. I've included some recs in my note for next steps with her DM management, if you see fit. I encouraged her to discuss further with you and I have follow up with her in ~8 weeks.  Thanks,  Taina Cam, RD, LD, [facility]

## 2022-04-13 NOTE — PROGRESS NOTES
"Diabetes Self-Management Education & Support    Presents for: Initial Assessment for new diagnosis    Type of service:  Video Visit    If the video visit is dropped, the video visit invitation should be resent by: Text to cell phone: [phone]    Originating Location (pt. Location): Home  Distant Location (provider location): Home  Mode of Communication:  Video Conference via [place]    Video Start Time: 7:35a  Video End Time (time video stopped): 8:26a    How would patient like to obtain AVS? MyChart      SUBJECTIVE/OBJECTIVE:  Presents for: Initial Assessment for new diagnosis  Accompanied by: Self  Diabetes education in the past 24mo: No  Focus of Visit: Monitoring  Diabetes type: Type 2  Date of diagnosis: 3/2022  Disease course: Improving  How confident are you filling out medical forms by yourself:: Not Assessed  Diabetes management related comments/concerns: Want to learn about diet, lose some weight, get off insulin  Difficulty affording diabetes medication?: No  Difficulty affording diabetes testing supplies?: No  Other concerns:: None  Cultural Influences/Ethnic Background:  Choose not to answer    Diabetes Symptoms & Complications:  Fatigue: No  Polydipsia: No  Polyuria: No  Visual change: No  Weight trend: Decreasing  Complications assessed today?: No    Patient Problem List and Family Medical History reviewed for relevant medical history, current medical status, and diabetes risk factors.    Vitals:  There were no vitals taken for this visit.  Estimated body mass index is 33.66 kg/m  as calculated from the following:    Height as of 4/4/22: 1.6 m (5' 3\").    Weight as of 4/4/22: 86.2 kg (190 lb).   Last 3 BP:   BP Readings from Last 3 Encounters:   04/04/22 115/75   03/22/22 116/75   03/18/22 124/82       History   Smoking Status     Never Smoker   Smokeless Tobacco     Never Used       Labs:  Lab Results   Component Value Date    A1C 12.5 03/18/2022    A1C 7.3 07/17/2017     Lab Results "   Component Value Date     03/22/2022     03/22/2022     07/17/2017     Lab Results   Component Value Date     07/17/2017     HDL Cholesterol   Date Value Ref Range Status   07/17/2017 51 >49 mg/dL Final   ]  GFR Estimate   Date Value Ref Range Status   03/22/2022 >90 >60 mL/min/1.73m2 Final     Comment:     Effective December 21, 2021 eGFRcr in adults is calculated using the 2021 CKD-EPI creatinine equation which includes age and gender (Maria Fernanda et al., NEJM, DOI: 10.[facility]6/[id number])   07/17/2017 >90  Non  GFR Calc   >60 mL/min/1.7m2 Final     GFR Estimate If Black   Date Value Ref Range Status   07/17/2017 >90   GFR Calc   >60 mL/min/1.7m2 Final     Lab Results   Component Value Date    CR 0.65 03/22/2022    CR 0.65 07/17/2017     No results found for: MICROALBUMIN    Healthy Eating:  Healthy Eating Assessed Today: Yes  Cultural/Rastafari diet restrictions?: No  Meal planning/habits: Smaller portions, Low carb  Meals include: Breakfast, Lunch, Dinner  Breakfast: egg bake w zucchini, mushroom, onion, kale/spinach, cheese, 2 sausages, 1 slice toast w/ avocado -  Lunch: soup OR salad - lettuce, onion, mushrooms, cucumbers, tomatoes, parmesan, vinagrette OR turkey sandwich  Dinner: cabbage, chicken, mushrooms, cheese  Snacks: cashews OR fruit  Has patient met with a dietitian in the past?: No    Being Active:  Being Active Assessed Today: Yes  Exercise:: Currently not exercising    Monitoring:  Monitoring Assessed Today: Yes  Did patient bring glucose meter to appointment? : No  Blood Glucose Meter: Unknown  Times checking blood sugar at home (number): 4  Times checking blood sugar at home (per): Day  Blood glucose trend: Fluctuating    Date Breakfast  Lunch  Dinner  Bedtime    Before After Before After Before After (3-4 hrs after dinner)   4/6 150  105  165  150   4/7 174  194  120  280   4/8 140  108  154  161   4/9 142  117  124  272   4/10 139  171  137   186   4/11 174  115  141  224   4/12 188  176  166  132   4/13 148           Taking Medications:  Diabetes Medication(s)     Diabetic Other       glucose (BD GLUCOSE) 4 g chewable tablet    Take 4 tablets by mouth every 15 minutes as needed for low blood sugar    Insulin       insulin aspart (NOVOLOG PEN) 100 UNIT/ML pen    Inject 1-7 Units Subcutaneous 3 times daily (before meals) Correction Scale - MEDIUM INSULIN RESISTANCE DOSING   Do Not give Correction Insulin if Pre-Meal BG less than 140. For Pre-Meal  - 189 give 1 unit. For Pre-Meal  - 239 give 2 units. For Pre-Meal  - 289 give 3 units. For Pre-Meal  - 339 give 4 units. For Pre-Meal - 399 give 5 units. For Pre-Meal -449 give 6 units For Pre-Meal BG greater than or equal to 450 give 7 units. To be given with prandial insulin, and based on pre-meal blood glucose.  Notify provider if glucose greater than or equal to 350 mg/dL after administration of correction dose. If given at mealtime, administer within 30 minutes of start of meal.     insulin aspart (NOVOLOG PEN) 100 UNIT/ML pen    Inject 1-5 Units Subcutaneous At Bedtime     insulin aspart (NOVOLOG PEN) 100 UNIT/ML pen    DOSE:  1 units per 15 grams of carbohydrate 3 times with meals.  Only chart total amount of units given.  Do not give if pre-prandial glucose is less than 60 mg/dL. If given at mealtime, administer within 30 minutes of start of meal.     insulin glargine (LANTUS PEN) 100 UNIT/ML pen    Inject 14 Units Subcutaneous every morning (before breakfast)          Taking Medication Assessed Today: Yes  Current Treatments: Insulin Injections  Dose schedule: Pre-breakfast, Pre-lunch, Pre-dinner, At bedtime  Given by: Patient  Problems taking diabetes medications regularly?: No  Diabetes medication side effects?: No    Problem Solving:  Problem Solving Assessed Today: Yes  Is the patient at risk for hypoglycemia?: Yes  Hypoglycemia Frequency: Never  Medical ID:  No  Does patient have glucagon emergency kit?: No  Is the patient at risk for DKA?: No  Does patient have severe weather/disaster plan for diabetes management?: No  Does patient have sick day plan for diabetes management?: No    Reducing Risks:  Reducing Risks Assessed Today: No  Diabetes Risks: Age over 45 years, Ethnicity, Sedentary Lifestyle  CAD Risks: Diabetes Mellitus, Obesity, Sedentary lifestyle  Has dilated eye exam at least once a year?: Yes    Healthy Coping:  Healthy Coping Assessed Today: Yes  Emotional response to diabetes: Ready to learn, Concern for health and well-being, Confidence diabetes can be controlled  Informal Support system:: Children  Stage of change: ACTION (Actively working towards change)  Support resources: None  Patient Activation Measure Survey Score:  TRAVIS Score (Last Two) 11/9/2012 4/14/2014   TRAVIS Raw Score 44 43   Activation Score 70.8 68.5   TRAVIS Level 4 4       Diabetes knowledge and skills assessment:   Patient is knowledgeable in diabetes management concepts related to: Healthy Eating, Monitoring and Taking Medication    Patient needs further education on the following diabetes management concepts: Healthy Eating, Being Active, Monitoring, Taking Medication, Problem Solving, Reducing Risks and Healthy Coping    Based on learning assessment above, most appropriate setting for further diabetes education would be: Group class or Individual setting.      INTERVENTIONS:    Education provided today on:  AADE Self-Care Behaviors:  Diabetes Pathophysiology  Healthy Eating: carbohydrate counting, weight reduction and portion control  Being Active: relationship to blood glucose and describe appropriate activity program  Monitoring: individual blood glucose targets and frequency of monitoring  Taking Medication: action of prescribed medication, side effects of prescribed medications and when to take medications  Problem Solving: low blood glucose - causes, signs/symptoms, treatment and  prevention and carrying a carbohydrate source at all times    Opportunities for ongoing education and support in diabetes-self management were discussed.    Pt verbalized understanding of concepts discussed and recommendations provided today.       Education Materials Provided:  No new materials provided today      ASSESSMENT:  Patient seeing very high blood sugars during hopsitalization, sent home on MDI and doing ok with this. Goals are to get off of insulin, lose some weight, eat healthy. She is generally eating a very healthy diet. Makes her own almond milk and yogurt. Has cut out sugar from coffee last year. Pre-makes breakfast & lunch every day. Feels comfortable with carb counting for meals.     Discussed use of CGMS and she would be interested in this. Has seen ads for Freestyle Elvia CGM and would benefit from CGM with alarms. Has appointment with new provider and will wait to ask them for orders for this next week when establishing care. Also reviewed desire to get off insulin & encouraged discussion with PCP on this goal.     Adjusted insulin dosing today but encouraged patient to discuss use of metformin + GLP1ra medications with PCP in order to move away from MDI. She is agreeable. Follow up planned in ~2 months for further review and to discuss blood sugar results plus medication regimen.         Patient's most recent   Lab Results   Component Value Date    A1C 12.5 03/18/2022    A1C 7.3 07/17/2017    is not meeting goal of <7.0    PLAN  See Patient Instructions for co-developed, patient-stated behavior change goals.  AVS printed and provided to patient today. See Follow-Up section for recommended follow-up.    Once patient has established PCP:  -Please place new Ambulatory Adult Diabetes Ed referral  -Would recommend use of Freestyle Elvia 2 CGMS to allow for more frequent monitoring + alarms for hypoglycemia & hyperglycemia  -Would recommend initiation of metformin XR 2000 mg/day - starting at 500  mg/day and advancing by 500 mg/day q 3-4 days   -Would consider use of GLP1ra med - Trulicity or Ozempic - to support weight loss & transition away from mealtime insulin. With start of GLP1, could consider stopping Humalog with meals  -Depending on blood sugar results with above meds, could work towards discontinuation of long-acting insulin as well     Taina Cam RD, LD, [facility]   Time Spent: 51 minutes  Encounter Type: Individual    Any diabetes medication dose changes were made via the CDE Protocol and Collaborative Practice Agreement with the patient's referring provider. A copy of this encounter was shared with the provider.

## 2022-04-13 NOTE — LETTER
"    4/13/2022         RE: Mary Franco  [address]  [place] [address]        Dear Colleague,    Thank you for referring your patient, Mary Franco, to the [facility] [place]. Please see a copy of my visit note below.    Diabetes Self-Management Education & Support    Presents for: Initial Assessment for new diagnosis    Type of service:  Video Visit    If the video visit is dropped, the video visit invitation should be resent by: Text to cell phone: [phone]    Originating Location (pt. Location): Home  Distant Location (provider location): Home  Mode of Communication:  Video Conference via [email or web address]    Video Start Time: 7:35a  Video End Time (time video stopped): 8:26a    How would patient like to obtain AVS? MyChart      SUBJECTIVE/OBJECTIVE:  Presents for: Initial Assessment for new diagnosis  Accompanied by: Self  Diabetes education in the past 24mo: No  Focus of Visit: Monitoring  Diabetes type: Type 2  Date of diagnosis: 3/2022  Disease course: Improving  How confident are you filling out medical forms by yourself:: Not Assessed  Diabetes management related comments/concerns: Want to learn about diet, lose some weight, get off insulin  Difficulty affording diabetes medication?: No  Difficulty affording diabetes testing supplies?: No  Other concerns:: None  Cultural Influences/Ethnic Background:  Choose not to answer    Diabetes Symptoms & Complications:  Fatigue: No  Polydipsia: No  Polyuria: No  Visual change: No  Weight trend: Decreasing  Complications assessed today?: No    Patient Problem List and Family Medical History reviewed for relevant medical history, current medical status, and diabetes risk factors.    Vitals:  There were no vitals taken for this visit.  Estimated body mass index is 33.66 kg/m  as calculated from the following:    Height as of 4/4/22: 1.6 m (5' 3\").    Weight as of 4/4/22: 86.2 kg (190 lb).   Last 3 BP:   BP Readings from Last 3 " Encounters:   04/04/22 115/75   03/22/22 116/75   03/18/22 124/82       History   Smoking Status     Never Smoker   Smokeless Tobacco     Never Used       Labs:  Lab Results   Component Value Date    A1C 12.5 03/18/2022    A1C 7.3 07/17/2017     Lab Results   Component Value Date     03/22/2022     03/22/2022     07/17/2017     Lab Results   Component Value Date     07/17/2017     HDL Cholesterol   Date Value Ref Range Status   07/17/2017 51 >49 mg/dL Final   ]  GFR Estimate   Date Value Ref Range Status   03/22/2022 >90 >60 mL/min/1.73m2 Final     Comment:     Effective December 21, 2021 eGFRcr in adults is calculated using the 2021 CKD-EPI creatinine equation which includes age and gender (Maria Fernanda aguayo al., NEJM, DOI: 10.1056/NFTDpu5080702)   07/17/2017 >90  Non  GFR Calc   >60 mL/min/1.7m2 Final     GFR Estimate If Black   Date Value Ref Range Status   07/17/2017 >90   GFR Calc   >60 mL/min/1.7m2 Final     Lab Results   Component Value Date    CR 0.65 03/22/2022    CR 0.65 07/17/2017     No results found for: MICROALBUMIN    Healthy Eating:  Healthy Eating Assessed Today: Yes  Cultural/Scientologist diet restrictions?: No  Meal planning/habits: Smaller portions, Low carb  Meals include: Breakfast, Lunch, Dinner  Breakfast: egg bake w zucchini, mushroom, onion, kale/spinach, cheese, 2 sausages, 1 slice toast w/ avocado -  Lunch: soup OR salad - lettuce, onion, mushrooms, cucumbers, tomatoes, parmesan, vinagrette OR turkey sandwich  Dinner: cabbage, chicken, mushrooms, cheese  Snacks: cashews OR fruit  Has patient met with a dietitian in the past?: No    Being Active:  Being Active Assessed Today: Yes  Exercise:: Currently not exercising    Monitoring:  Monitoring Assessed Today: Yes  Did patient bring glucose meter to appointment? : No  Blood Glucose Meter: Unknown  Times checking blood sugar at home (number): 4  Times checking blood sugar at home (per):  Day  Blood glucose trend: Fluctuating    Date Breakfast  Lunch  Dinner  Bedtime    Before After Before After Before After (3-4 hrs after dinner)   4/6 150  105  165  150   4/7 174  194  120  280   4/8 140  108  154  161   4/9 142  117  124  272   4/10 139  171  137  186   4/11 174  115  141  224   4/12 188  176  166  132   4/13 148           Taking Medications:  Diabetes Medication(s)     Diabetic Other       glucose (BD GLUCOSE) 4 g chewable tablet    Take 4 tablets by mouth every 15 minutes as needed for low blood sugar    Insulin       insulin aspart (NOVOLOG PEN) 100 UNIT/ML pen    Inject 1-7 Units Subcutaneous 3 times daily (before meals) Correction Scale - MEDIUM INSULIN RESISTANCE DOSING   Do Not give Correction Insulin if Pre-Meal BG less than 140. For Pre-Meal  - 189 give 1 unit. For Pre-Meal  - 239 give 2 units. For Pre-Meal  - 289 give 3 units. For Pre-Meal  - 339 give 4 units. For Pre-Meal - 399 give 5 units. For Pre-Meal -449 give 6 units For Pre-Meal BG greater than or equal to 450 give 7 units. To be given with prandial insulin, and based on pre-meal blood glucose.  Notify provider if glucose greater than or equal to 350 mg/dL after administration of correction dose. If given at mealtime, administer within 30 minutes of start of meal.     insulin aspart (NOVOLOG PEN) 100 UNIT/ML pen    Inject 1-5 Units Subcutaneous At Bedtime     insulin aspart (NOVOLOG PEN) 100 UNIT/ML pen    DOSE:  1 units per 15 grams of carbohydrate 3 times with meals.  Only chart total amount of units given.  Do not give if pre-prandial glucose is less than 60 mg/dL. If given at mealtime, administer within 30 minutes of start of meal.     insulin glargine (LANTUS PEN) 100 UNIT/ML pen    Inject 14 Units Subcutaneous every morning (before breakfast)          Taking Medication Assessed Today: Yes  Current Treatments: Insulin Injections  Dose schedule: Pre-breakfast, Pre-lunch, Pre-dinner, At  bedtime  Given by: Patient  Problems taking diabetes medications regularly?: No  Diabetes medication side effects?: No    Problem Solving:  Problem Solving Assessed Today: Yes  Is the patient at risk for hypoglycemia?: Yes  Hypoglycemia Frequency: Never  Medical ID: No  Does patient have glucagon emergency kit?: No  Is the patient at risk for DKA?: No  Does patient have severe weather/disaster plan for diabetes management?: No  Does patient have sick day plan for diabetes management?: No    Reducing Risks:  Reducing Risks Assessed Today: No  Diabetes Risks: Age over 45 years, Ethnicity, Sedentary Lifestyle  CAD Risks: Diabetes Mellitus, Obesity, Sedentary lifestyle  Has dilated eye exam at least once a year?: Yes    Healthy Coping:  Healthy Coping Assessed Today: Yes  Emotional response to diabetes: Ready to learn, Concern for health and well-being, Confidence diabetes can be controlled  Informal Support system:: Children  Stage of change: ACTION (Actively working towards change)  Support resources: None  Patient Activation Measure Survey Score:  TRAVIS Score (Last Two) 11/9/2012 4/14/2014   TRAVIS Raw Score 44 43   Activation Score 70.8 68.5   TRAVIS Level 4 4       Diabetes knowledge and skills assessment:   Patient is knowledgeable in diabetes management concepts related to: Healthy Eating, Monitoring and Taking Medication    Patient needs further education on the following diabetes management concepts: Healthy Eating, Being Active, Monitoring, Taking Medication, Problem Solving, Reducing Risks and Healthy Coping    Based on learning assessment above, most appropriate setting for further diabetes education would be: Group class or Individual setting.      INTERVENTIONS:    Education provided today on:  AADE Self-Care Behaviors:  Diabetes Pathophysiology  Healthy Eating: carbohydrate counting, weight reduction and portion control  Being Active: relationship to blood glucose and describe appropriate activity  program  Monitoring: individual blood glucose targets and frequency of monitoring  Taking Medication: action of prescribed medication, side effects of prescribed medications and when to take medications  Problem Solving: low blood glucose - causes, signs/symptoms, treatment and prevention and carrying a carbohydrate source at all times    Opportunities for ongoing education and support in diabetes-self management were discussed.    Pt verbalized understanding of concepts discussed and recommendations provided today.       Education Materials Provided:  No new materials provided today      ASSESSMENT:  Patient seeing very high blood sugars during hopsitalization, sent home on MDI and doing ok with this. Goals are to get off of insulin, lose some weight, eat healthy. She is generally eating a very healthy diet. Makes her own almond milk and yogurt. Has cut out sugar from coffee last year. Pre-makes breakfast & lunch every day. Feels comfortable with carb counting for meals.     Discussed use of CGMS and she would be interested in this. Has seen ads for Netviewer CGM and would benefit from CGM with alarms. Has appointment with new provider and will wait to ask them for orders for this next week when establishing care. Also reviewed desire to get off insulin & encouraged discussion with PCP on this goal.     Adjusted insulin dosing today but encouraged patient to discuss use of metformin + GLP1ra medications with PCP in order to move away from MDI. She is agreeable. Follow up planned in ~2 months for further review and to discuss blood sugar results plus medication regimen.         Patient's most recent   Lab Results   Component Value Date    A1C 12.5 03/18/2022    A1C 7.3 07/17/2017    is not meeting goal of <7.0    PLAN  See Patient Instructions for co-developed, patient-stated behavior change goals.  AVS printed and provided to patient today. See Follow-Up section for recommended follow-up.    Once patient has  established PCP:  -Please place new Ambulatory Adult Diabetes Ed referral  -Would recommend use of Freestyle Elvia 2 CGMS to allow for more frequent monitoring + alarms for hypoglycemia & hyperglycemia  -Would recommend initiation of metformin XR 2000 mg/day - starting at 500 mg/day and advancing by 500 mg/day q 3-4 days   -Would consider use of GLP1ra med - Trulicity or Ozempic - to support weight loss & transition away from mealtime insulin. With start of GLP1, could consider stopping Humalog with meals  -Depending on blood sugar results with above meds, could work towards discontinuation of long-acting insulin as well     Taina Cam RD, LD, [facility]   Time Spent: 51 minutes  Encounter Type: Individual    Any diabetes medication dose changes were made via the CDE Protocol and Collaborative Practice Agreement with the patient's referring provider. A copy of this encounter was shared with the provider.

## 2022-04-13 NOTE — PATIENT INSTRUCTIONS
Insulin adjustments:  -Increase Lantus dose to 15 units in AM  -Continue 1 unit:15 grams carb Humalog dosing at breakfast & lunch   -Increase to 1 unit:12 grams carb Humalog with dinner    Things to discuss with PCP next week:   -use of Freestyle Elvia 2 continuous glucose monitoring system  -Desire to get off insulin -- will include other med recommendations in my note    Call or send a Pronutria message with any questions or concerns    Taina Cam RD, LD, [facility]   Diabetes Education Triage Line: [phone]  Diabetes Education Appointment [phone]

## 2022-04-15 ASSESSMENT — ENCOUNTER SYMPTOMS
BREAST MASS: 0
HEMATOCHEZIA: 0
SORE THROAT: 0
FREQUENCY: 0
NERVOUS/ANXIOUS: 0
SHORTNESS OF BREATH: 0
DIZZINESS: 0
DYSURIA: 0
COUGH: 0
NAUSEA: 0
HEMATURIA: 0
EYE PAIN: 0
PALPITATIONS: 0
ABDOMINAL PAIN: 0
WEAKNESS: 0
DIARRHEA: 0
HEADACHES: 0
FEVER: 0
JOINT SWELLING: 0
HEARTBURN: 0
CHILLS: 0
MYALGIAS: 0
CONSTIPATION: 0
PARESTHESIAS: 0
ARTHRALGIAS: 1

## 2022-04-18 ENCOUNTER — OFFICE VISIT (OUTPATIENT)
Dept: NEUROSURGERY | Facility: CLINIC | Age: 57
End: 2022-04-18
Payer: COMMERCIAL

## 2022-04-18 ENCOUNTER — HOSPITAL ENCOUNTER (OUTPATIENT)
Dept: CT IMAGING | Facility: CLINIC | Age: 57
Discharge: HOME | End: 2022-04-18
Attending: PHYSICIAN ASSISTANT | Admitting: PHYSICIAN ASSISTANT
Payer: COMMERCIAL

## 2022-04-18 VITALS
HEIGHT: 63 IN | DIASTOLIC BLOOD PRESSURE: 75 MMHG | HEART RATE: 92 BPM | BODY MASS INDEX: 33.66 KG/M2 | SYSTOLIC BLOOD PRESSURE: 129 MMHG | WEIGHT: 190 LBS | OXYGEN SATURATION: 97 %

## 2022-04-18 DIAGNOSIS — S06.5XAA SUBDURAL HEMATOMA (H): ICD-10-CM

## 2022-04-18 DIAGNOSIS — Z98.890 HISTORY OF BURR HOLE SURGERY: Primary | ICD-10-CM

## 2022-04-18 DIAGNOSIS — Z98.890 HISTORY OF BURR HOLE SURGERY: ICD-10-CM

## 2022-04-18 PROCEDURE — 70450 CT HEAD/BRAIN W/O DYE: CPT

## 2022-04-18 PROCEDURE — 99024 POSTOP FOLLOW-UP VISIT: CPT | Performed by: PHYSICIAN ASSISTANT

## 2022-04-18 ASSESSMENT — PAIN SCALES - GENERAL: PAINLEVEL: NO PAIN (0)

## 2022-04-18 NOTE — LETTER
4/18/2022         RE: Mary Franco  [address]  [place] [address]        Dear Colleague,    Thank you for referring your patient, Mary Franco, to the [facility]. Please see a copy of my visit note below.    Neurosurgery 4-week follow-up    Ms. Franco is a 56-year-old female who is 4 weeks status post right bur hole for subdural hematoma.  She states she is doing well, has occasional tenderness on the left side of her head around the area of her prior surgery.  She feels like she is moving in the right direction, and states she is ready to get back to work.  She denies any issues with her incision.  Exam     Alert and oriented no acute distress  Moving all extremities appropriately  Gait is normal  Incision healing well    Imaging    Head CT scan demonstrates resolution of the right-sided subdural hematoma.    Assessment    Status post right bur hole for drainage of subdural hematoma    History of left subdural hematoma status post craniotomy    Plan    Gradually increase activity as tolerated.   Follow-up as scheduled.  May return to work with no restrictions.        Again, thank you for allowing me to participate in the care of your patient.        Sincerely,        Ranjeet Araujo PA-C

## 2022-04-18 NOTE — PROGRESS NOTES
Neurosurgery 4-week follow-up    Ms. Franco is a 56-year-old female who is 4 weeks status post right bur hole for subdural hematoma.  She states she is doing well, has occasional tenderness on the left side of her head around the area of her prior surgery.  She feels like she is moving in the right direction, and states she is ready to get back to work.  She denies any issues with her incision.  Exam     Alert and oriented no acute distress  Moving all extremities appropriately  Gait is normal  Incision healing well    Imaging    Head CT scan demonstrates resolution of the right-sided subdural hematoma.    Assessment    Status post right bur hole for drainage of subdural hematoma    History of left subdural hematoma status post craniotomy    Plan    Gradually increase activity as tolerated.   Follow-up as scheduled.  May return to work with no restrictions.

## 2022-04-18 NOTE — NURSING NOTE
"Mary Franco is a 56 year old female who presents for:  Chief Complaint   Patient presents with     Surgical Followup     4 week follow up        Initial Vitals:  /75   Pulse 92   Ht 5' 3\" (1.6 m)   Wt 190 lb (86.2 kg)   SpO2 97%   BMI 33.66 kg/m   Estimated body mass index is 33.66 kg/m  as calculated from the following:    Height as of this encounter: 5' 3\" (1.6 m).    Weight as of this encounter: 190 lb (86.2 kg).. Body surface area is 1.96 meters squared. BP completed using cuff size: regular  No Pain (0)    Nursing Comments:     Cristiane Barros    "

## 2022-04-21 ENCOUNTER — OFFICE VISIT (OUTPATIENT)
Dept: FAMILY MEDICINE | Facility: CLINIC | Age: 57
End: 2022-04-21
Payer: COMMERCIAL

## 2022-04-21 ENCOUNTER — DOCUMENTATION ONLY (OUTPATIENT)
Dept: NEUROSURGERY | Facility: CLINIC | Age: 57
End: 2022-04-21

## 2022-04-21 VITALS
SYSTOLIC BLOOD PRESSURE: 118 MMHG | HEART RATE: 91 BPM | OXYGEN SATURATION: 97 % | TEMPERATURE: 96.9 F | WEIGHT: 189 LBS | BODY MASS INDEX: 33.48 KG/M2 | RESPIRATION RATE: 18 BRPM | DIASTOLIC BLOOD PRESSURE: 72 MMHG

## 2022-04-21 DIAGNOSIS — L98.9 SKIN LESION: ICD-10-CM

## 2022-04-21 DIAGNOSIS — Z12.31 VISIT FOR SCREENING MAMMOGRAM: ICD-10-CM

## 2022-04-21 DIAGNOSIS — Z13.220 SCREENING FOR HYPERLIPIDEMIA: ICD-10-CM

## 2022-04-21 DIAGNOSIS — N89.8 VAGINAL DISCHARGE: ICD-10-CM

## 2022-04-21 DIAGNOSIS — S06.5XAA SUBDURAL HEMATOMA (H): Primary | ICD-10-CM

## 2022-04-21 DIAGNOSIS — Z00.00 ENCOUNTER FOR ANNUAL HEALTH EXAMINATION: ICD-10-CM

## 2022-04-21 DIAGNOSIS — D50.9 IRON DEFICIENCY ANEMIA, UNSPECIFIED IRON DEFICIENCY ANEMIA TYPE: ICD-10-CM

## 2022-04-21 DIAGNOSIS — Z23 NEED FOR VACCINATION: ICD-10-CM

## 2022-04-21 DIAGNOSIS — E11.9 TYPE 2 DIABETES MELLITUS WITHOUT COMPLICATION, WITHOUT LONG-TERM CURRENT USE OF INSULIN (H): ICD-10-CM

## 2022-04-21 LAB
CLUE CELLS: ABNORMAL
TRICHOMONAS, WET PREP: ABNORMAL
WBC'S/HIGH POWER FIELD, WET PREP: ABNORMAL
YEAST, WET PREP: ABNORMAL

## 2022-04-21 PROCEDURE — 99396 PREV VISIT EST AGE 40-64: CPT | Mod: 25 | Performed by: PHYSICIAN ASSISTANT

## 2022-04-21 PROCEDURE — G0145 SCR C/V CYTO,THINLAYER,RESCR: HCPCS | Performed by: PHYSICIAN ASSISTANT

## 2022-04-21 PROCEDURE — 80061 LIPID PANEL: CPT | Performed by: PHYSICIAN ASSISTANT

## 2022-04-21 PROCEDURE — 36415 COLL VENOUS BLD VENIPUNCTURE: CPT | Performed by: PHYSICIAN ASSISTANT

## 2022-04-21 PROCEDURE — 99214 OFFICE O/P EST MOD 30 MIN: CPT | Mod: 25 | Performed by: PHYSICIAN ASSISTANT

## 2022-04-21 PROCEDURE — 90472 IMMUNIZATION ADMIN EACH ADD: CPT | Performed by: PHYSICIAN ASSISTANT

## 2022-04-21 PROCEDURE — 82043 UR ALBUMIN QUANTITATIVE: CPT | Performed by: PHYSICIAN ASSISTANT

## 2022-04-21 PROCEDURE — 87210 SMEAR WET MOUNT SALINE/INK: CPT | Performed by: PHYSICIAN ASSISTANT

## 2022-04-21 PROCEDURE — 90750 HZV VACC RECOMBINANT IM: CPT | Performed by: PHYSICIAN ASSISTANT

## 2022-04-21 PROCEDURE — 90471 IMMUNIZATION ADMIN: CPT | Performed by: PHYSICIAN ASSISTANT

## 2022-04-21 PROCEDURE — 99207 PR FOOT EXAM NO CHARGE: CPT | Mod: 25 | Performed by: PHYSICIAN ASSISTANT

## 2022-04-21 PROCEDURE — 87624 HPV HI-RISK TYP POOLED RSLT: CPT | Performed by: PHYSICIAN ASSISTANT

## 2022-04-21 PROCEDURE — 90715 TDAP VACCINE 7 YRS/> IM: CPT | Performed by: PHYSICIAN ASSISTANT

## 2022-04-21 PROCEDURE — 80053 COMPREHEN METABOLIC PANEL: CPT | Performed by: PHYSICIAN ASSISTANT

## 2022-04-21 RX ORDER — METFORMIN HCL 500 MG
500 TABLET, EXTENDED RELEASE 24 HR ORAL
Qty: 180 TABLET | Refills: 1 | Status: SHIPPED | OUTPATIENT
Start: 2022-04-21 | End: 2022-06-10

## 2022-04-21 RX ORDER — DULAGLUTIDE 0.75 MG/.5ML
0.75 INJECTION, SOLUTION SUBCUTANEOUS
Qty: 0.5 ML | Refills: 3 | Status: SHIPPED | OUTPATIENT
Start: 2022-04-21 | End: 2022-06-10 | Stop reason: DRUGHIGH

## 2022-04-21 ASSESSMENT — ENCOUNTER SYMPTOMS
MYALGIAS: 0
PARESTHESIAS: 0
NAUSEA: 0
HEMATURIA: 0
JOINT SWELLING: 0
WEAKNESS: 0
SHORTNESS OF BREATH: 0
FREQUENCY: 0
FEVER: 0
NERVOUS/ANXIOUS: 0
HEMATOCHEZIA: 0
CHILLS: 0
ABDOMINAL PAIN: 0
DIZZINESS: 0
DIARRHEA: 0
HEARTBURN: 0
SORE THROAT: 0
BREAST MASS: 0
CONSTIPATION: 0
PALPITATIONS: 0
EYE PAIN: 0
DYSURIA: 0
ARTHRALGIAS: 1
HEADACHES: 0
COUGH: 0

## 2022-04-21 ASSESSMENT — PAIN SCALES - GENERAL: PAINLEVEL: NO PAIN (0)

## 2022-04-21 NOTE — PROGRESS NOTES
"  Assessment & Plan     Encounter for annual health examination    Type 2 diabetes mellitus without complication, without long-term current use of insulin (H)  Start Trulicity and metformin 500 mg.  She will increase Metformin to 1000 mg daily in 2 weeks if tolerating. She will STOP her mealtime insulin at initiation of Trulicity. Follow up with DE for CGM and ongoing monitoring.   Follow up in 3 months  - Albumin Random Urine Quantitative with Creat Ratio; Future  - Adult Gastro Ref - Procedure Only; Future  - FOOT EXAM  - Comprehensive metabolic panel (BMP + Alb, Alk Phos, ALT, AST, Total. Bili, TP); Future  - metFORMIN (GLUCOPHAGE-XR) 500 MG 24 hr tablet; Take 1 tablet (500 mg) by mouth daily (with dinner)  - dulaglutide (TRULICITY) 0.75 MG/0.5ML pen; Inject 0.75 mg Subcutaneous every 7 days  - Comprehensive metabolic panel (BMP + Alb, Alk Phos, ALT, AST, Total. Bili, TP)  - Albumin Random Urine Quantitative with Creat Ratio    Iron deficiency anemia, unspecified iron deficiency anemia type  stable    Subdural hematoma (H)  Per neurosurgery        Screening for hyperlipidemia  - Lipid panel reflex to direct LDL Fasting; Future  - Lipid panel reflex to direct LDL Fasting      Cervical cancer screening  - Pap Screen with HPV - recommended age 30 - 65 years  - HPV Hold (Lab Only)  - HPV High Risk Types DNA Cervical    Visit for screening mammogram  - MA SCREENING DIGITAL BILAT - Future  (s+30); Future    Vaginal discharge  - Wet prep - Clinic Collect    Skin lesion  Mole on left ear,she would like referral  - Adult Dermatology Referral; Future    Need for vaccination  - SHINGRIX [[id number]]  - TDAP VACCINE (Adacel, Boostrix)  [[id number]]         BMI:   Estimated body mass index is 33.48 kg/m  as calculated from the following:    Height as of 4/18/22: 1.6 m (5' 3\").    Weight as of this encounter: 85.7 kg (189 lb).       Return in about 3 months (around 7/21/2022) for med check.    [facility] JESSICA Hernandez is a 56 year old who presents for the following health issues     Healthy Habits:     Getting at least 3 servings of Calcium per day:  Yes    Bi-annual eye exam:  Yes    Dental care twice a year:  Yes    Sleep apnea or symptoms of sleep apnea:  None    Diet:  Carbohydrate counting    Frequency of exercise:  2-3 days/week    Duration of exercise:  30-45 minutes    Taking medications regularly:  Yes    Medication side effects:  Other    PHQ-2 Total Score: 0    Additional concerns today:  No       Mary is a new patient to me today.   She was recently hospitalized with a subdural hematoma. She underwent right bur hole for evacuation and continues to follow with Neurosurgery. She is feeling well at this time.    Incidentally, she was found to have Diabetes mellitus during her hospitalization.  She was started on basal insulin as well as sliding scale mealtime insulin.  She has met with diabetic education and is working on controlling her sugars with diet and exercise.  Fasting sugars have been improved (140-160). Her goal is to get off of her insulin.  Would like to discuss diabetic regimen.  Diabetic education discussed starting Trulicity and metformin, also recommended CGM    Review of Systems   Constitutional: Negative for chills and fever.   HENT: Negative for congestion, ear pain, hearing loss and sore throat.    Eyes: Negative for pain and visual disturbance.   Respiratory: Negative for cough and shortness of breath.    Cardiovascular: Negative for chest pain, palpitations and peripheral edema.   Gastrointestinal: Negative for abdominal pain, constipation, diarrhea, heartburn, hematochezia and nausea.   Breasts:  Negative for tenderness, breast mass and discharge.   Genitourinary: Negative for dysuria, frequency, genital sores, hematuria, pelvic pain, urgency, vaginal bleeding and vaginal discharge.   Musculoskeletal: Positive for arthralgias. Negative for joint  swelling and myalgias.   Skin: Negative for rash.   Neurological: Negative for dizziness, weakness, headaches and paresthesias.   Psychiatric/Behavioral: Negative for mood changes. The patient is not nervous/anxious.             Objective    /72   Pulse 91   Temp 96.9  F (36.1  C) (Tympanic)   Resp 18   Wt 85.7 kg (189 lb)   LMP 10/14/2015   SpO2 97%   BMI 33.48 kg/m    Body mass index is 33.48 kg/m .  Physical Exam   GENERAL: healthy, alert and no distress  EYES: Eyes grossly normal to inspection, PERRL and conjunctivae and sclerae normal  HENT: ear canals and TM's normal, nose and mouth without ulcers or lesions  NECK: no adenopathy, no asymmetry, masses, or scars and thyroid normal to palpation  RESP: lungs clear to auscultation - no rales, rhonchi or wheezes  BREAST: normal without masses, tenderness or nipple discharge and no palpable axillary masses or adenopathy  CV: regular rate and rhythm, normal S1 S2, no S3 or S4, no murmur, click or rub, no peripheral edema and peripheral pulses strong  ABDOMEN: soft, nontender, no hepatosplenomegaly, no masses and bowel sounds normal   (female): normal female external genitalia, normal urethral meatus, vaginal mucosa  Speculum examination shows a small amount of thick white discharge noted, samples collected  MS: no gross musculoskeletal defects noted, no edema  SKIN: no suspicious lesions or rashes  NEURO: Normal strength and tone, mentation intact and speech normal  PSYCH: mentation appears normal, affect normal/bright

## 2022-04-22 ENCOUNTER — ANCILLARY PROCEDURE (OUTPATIENT)
Dept: MAMMOGRAPHY | Facility: CLINIC | Age: 57
End: 2022-04-22
Payer: COMMERCIAL

## 2022-04-22 DIAGNOSIS — Z12.31 VISIT FOR SCREENING MAMMOGRAM: ICD-10-CM

## 2022-04-22 LAB
ALBUMIN SERPL-MCNC: 4 G/DL (ref 3.4–5)
ALP SERPL-CCNC: 77 U/L (ref 40–150)
ALT SERPL W P-5'-P-CCNC: 27 U/L (ref 0–50)
ANION GAP SERPL CALCULATED.3IONS-SCNC: 5 MMOL/L (ref 3–14)
AST SERPL W P-5'-P-CCNC: 13 U/L (ref 0–45)
BILIRUB SERPL-MCNC: 0.3 MG/DL (ref 0.2–1.3)
BUN SERPL-MCNC: 13 MG/DL (ref 7–30)
CALCIUM SERPL-MCNC: 9.9 MG/DL (ref 8.5–10.1)
CHLORIDE BLD-SCNC: 106 MMOL/L (ref 94–109)
CHOLEST SERPL-MCNC: 221 MG/DL
CO2 SERPL-SCNC: 27 MMOL/L (ref 20–32)
CREAT SERPL-MCNC: 0.61 MG/DL (ref 0.52–1.04)
CREAT UR-MCNC: 96 MG/DL
FASTING STATUS PATIENT QL REPORTED: NO
GFR SERPL CREATININE-BSD FRML MDRD: >90 ML/MIN/1.73M2
GLUCOSE BLD-MCNC: 112 MG/DL (ref 70–99)
HDLC SERPL-MCNC: 59 MG/DL
LDLC SERPL CALC-MCNC: 147 MG/DL
MICROALBUMIN UR-MCNC: 23 MG/L
MICROALBUMIN/CREAT UR: 23.96 MG/G CR (ref 0–25)
NONHDLC SERPL-MCNC: 162 MG/DL
POTASSIUM BLD-SCNC: 3.9 MMOL/L (ref 3.4–5.3)
PROT SERPL-MCNC: 8 G/DL (ref 6.8–8.8)
SODIUM SERPL-SCNC: 138 MMOL/L (ref 133–144)
TRIGL SERPL-MCNC: 74 MG/DL

## 2022-04-22 PROCEDURE — 77067 SCR MAMMO BI INCL CAD: CPT | Mod: TC | Performed by: RADIOLOGY

## 2022-04-22 PROCEDURE — 77063 BREAST TOMOSYNTHESIS BI: CPT | Mod: TC | Performed by: RADIOLOGY

## 2022-04-25 NOTE — RESULT ENCOUNTER NOTE
Mary-  Here are your recent results.     It was nice to meet you last week!    Overall, Your labs look great!  Your glucose was 112 which is a huge improvement. I have reached our to the diabetic educator ( Taina) regarding an appointment for you to get started on Trulicity and about your Continuous glucose monitor. I will let you know when I hear back from her, hopefully in the next day or two.    Regarding your cholesterol, your LDL cholesterol is elevated.  This can place you at higher risk for cardiovascular disease.  I recommend that you begin taking a medication called Lipitor to help with your cholesterol.  You can take this once daily.  I have sent this to the pharmacy for you. I would also recommend that you begin taking a baby aspirin (Aspirin 81 mg) daily to help reduce your risk of cardiovascular disease.   Your wet prep sample did not show an infection.     Please let me know if you have questions. We should follow up in about 3 months  Zafar Jerome PA-C

## 2022-04-26 ENCOUNTER — TELEPHONE (OUTPATIENT)
Dept: EDUCATION SERVICES | Facility: CLINIC | Age: 57
End: 2022-04-26
Payer: COMMERCIAL

## 2022-04-26 DIAGNOSIS — E11.9 TYPE 2 DIABETES MELLITUS WITHOUT COMPLICATION, WITHOUT LONG-TERM CURRENT USE OF INSULIN (H): Primary | ICD-10-CM

## 2022-04-26 NOTE — TELEPHONE ENCOUNTER
Yuliana Stephen are pended orders for Elvia 2 CGMS. Please sign off if you agree or provide an alternative plan.     Taina Cam RD, LD, [facility]

## 2022-04-27 LAB
BKR LAB AP GYN ADEQUACY: NORMAL
BKR LAB AP GYN INTERPRETATION: NORMAL
BKR LAB AP HPV REFLEX: NORMAL
BKR LAB AP PREVIOUS ABNORMAL: NORMAL
PATH REPORT.COMMENTS IMP SPEC: NORMAL
PATH REPORT.COMMENTS IMP SPEC: NORMAL
PATH REPORT.RELEVANT HX SPEC: NORMAL

## 2022-04-28 LAB
HUMAN PAPILLOMA VIRUS 16 DNA: NEGATIVE
HUMAN PAPILLOMA VIRUS 18 DNA: NEGATIVE
HUMAN PAPILLOMA VIRUS FINAL DIAGNOSIS: NORMAL
HUMAN PAPILLOMA VIRUS OTHER HR: NEGATIVE

## 2022-05-16 DIAGNOSIS — E11.9 TYPE 2 DIABETES MELLITUS WITHOUT COMPLICATION, WITHOUT LONG-TERM CURRENT USE OF INSULIN (H): ICD-10-CM

## 2022-05-18 NOTE — TELEPHONE ENCOUNTER
Routing refill request to provider for review/approval because:   Diabetic Supplies Protocol Failed 05/16/2022 02:55 PM   Protocol Details  Recent (6 mo) or future (30 days) visit within the authorizing provider's specialty          If PCP needs to have team do something (appointment etc ) please route to your team - thank you         Brynn Parker RN, BSN  [facility]

## 2022-06-01 ENCOUNTER — TRANSFERRED RECORDS (OUTPATIENT)
Dept: HEALTH INFORMATION MANAGEMENT | Facility: CLINIC | Age: 57
End: 2022-06-01

## 2022-06-01 LAB — RETINOPATHY: NORMAL

## 2022-06-10 ENCOUNTER — TELEPHONE (OUTPATIENT)
Dept: FAMILY MEDICINE | Facility: CLINIC | Age: 57
End: 2022-06-10
Payer: COMMERCIAL

## 2022-06-10 ENCOUNTER — ALLIED HEALTH/NURSE VISIT (OUTPATIENT)
Dept: EDUCATION SERVICES | Facility: CLINIC | Age: 57
End: 2022-06-10
Payer: COMMERCIAL

## 2022-06-10 VITALS — WEIGHT: 189.8 LBS | BODY MASS INDEX: 33.62 KG/M2

## 2022-06-10 DIAGNOSIS — E11.9 TYPE 2 DIABETES MELLITUS WITHOUT COMPLICATION, WITHOUT LONG-TERM CURRENT USE OF INSULIN (H): Primary | ICD-10-CM

## 2022-06-10 PROCEDURE — G0108 DIAB MANAGE TRN  PER INDIV: HCPCS | Performed by: DIETITIAN, REGISTERED

## 2022-06-10 RX ORDER — DULAGLUTIDE 1.5 MG/.5ML
1.5 INJECTION, SOLUTION SUBCUTANEOUS
Qty: 2 ML | Refills: 11 | Status: SHIPPED | OUTPATIENT
Start: 2022-06-10 | End: 2022-11-15

## 2022-06-10 RX ORDER — METFORMIN HCL 500 MG
2000 TABLET, EXTENDED RELEASE 24 HR ORAL
Qty: 360 TABLET | Refills: 3 | Status: SHIPPED | OUTPATIENT
Start: 2022-06-10 | End: 2022-11-15

## 2022-06-10 NOTE — TELEPHONE ENCOUNTER
Please send over a new prescription that includes a numerical frequency of use. Unable to finish a test claim without that information. Thank you!

## 2022-06-10 NOTE — PATIENT INSTRUCTIONS
-Increase your metformin dose to goal 2000 mg/day (4 tablets) - add 1 tablet every 3-7 days until at the goal dose   -Start taking the Trulicity every 7 days - take 0.75 mg for the next 3 doses and then increase to 1.5 mg weekly after that   -Continue to scan your Elvia frequently - you're doing a great job with this!  -Continue with Lantus 12 units in the AM for now    Call or send a [employer] message with any questions or concerns    Taina Cam RD, LD, [facility]   Diabetes Education Triage Line: [phone]  Diabetes Education Appointment [phone]

## 2022-06-10 NOTE — LETTER
6/10/2022         RE: Mary Franco  [address]  [place] [address]        Dear Colleague,    Thank you for referring your patient, Mary Franco, to the [facility] CL[facility] [place]. Please see a copy of my visit note below.    No notes on file

## 2022-06-15 NOTE — PROGRESS NOTES
Diabetes Self-Management Education & Support    Presents for: Follow-up    Type of Visit: In Person    How would patient like to obtain AVS? printed    ASSESSMENT:    Mary is doing very well getting blood sugars under better control. Has stopped mealtime insulin and started Trulicity, although determined she is only taking every other week, not every 7 days as prescribed. Will have her increase to weekly and then increase to 1.5 mg after a few more weeks at lower dose. She is also taking metformin, only 1 tablet daily but is agreeable to increasing to optimal dose of 2000 mg/day. She is taking long-acting insulin without issue. Discussed keeping current insulin dose where it is and working up on other meds, with hopes of stopping insulin eventually. She is agreeable. She will be due to get A1C rechecked.     Patient's most recent   Lab Results   Component Value Date    A1C 12.5 03/18/2022    A1C 7.3 07/17/2017    is not meeting goal of <7.0    Diabetes knowledge and skills assessment:   Patient is knowledgeable in diabetes management concepts related to: Healthy Eating, Being Active, Monitoring and Taking Medication    Continue education with the following diabetes management concepts: Monitoring, Taking Medication, Problem Solving, Reducing Risks and Healthy Coping    Based on learning assessment above, most appropriate setting for further diabetes education would be: Group class or Individual setting.    INTERVENTIONS:    Education provided today on:  AADE Self-Care Behaviors:  Monitoring: individual blood glucose targets and frequency of monitoring  Taking Medication: action of prescribed medication, drawing up, administering and storing injectable diabetes medications, side effects of prescribed medications and when to take medications  Reducing Risks: major complications of diabetes, prevention, early diagnostic measures and treatment of complications, foot care, annual eye exam, aspirin therapy, A1C - goals,  relating to blood glucose levels, how often to check, lipids levels and goals and blood pressure and goals  Healthy Coping: recognize feelings about diagnosis and benefits of making appropriate lifestyle changes    Opportunities for ongoing education and support in diabetes-self management were discussed. Pt verbalized understanding of concepts discussed and recommendations provided today.       Education Materials Provided:  Goals for Your Diabetes Care          PLAN  -Increase your metformin dose to goal 2000 mg/day (4 tablets) - add 1 tablet every 3-7 days until at the goal dose   -Start taking the Trulicity every 7 days - take 0.75 mg for the next 3 doses and then increase to 1.5 mg weekly after that   -Continue to scan your Elvia frequently - you're doing a great job with this!  -Continue with Lantus 12 units in the AM for now    Topics to cover at upcoming visits: Taking Medication, Problem Solving, Reducing Risks and Healthy Coping  Follow-up: 7/22/22    See Goals Section for co-developed, patient-stated behavior change goals.  AVS provided to patient today.          SUBJECTIVE / OBJECTIVE:  Presents for: Follow-up  Accompanied by: Self  Diabetes education in the past 24mo: Yes  Focus of Visit: Monitoring, Taking Medication, Assistance w/ making life changes  Diabetes type: Type 2  Disease course: Improving  How confident are you filling out medical forms by yourself:: Extremely  Diabetes management related comments/concerns: none, started metformin & Trulicity since last visit, no side effects at this time.  Transportation concerns: No  Difficulty affording diabetes medication?: No  Difficulty affording diabetes testing supplies?: No  Other concerns:: None  Cultural Influences/Ethnic Background:  Not  or     Diabetes Symptoms & Complications:  Fatigue: Sometimes  Neuropathy: No  Polydipsia: No  Polyphagia: No  Polyuria: No  Visual change: No  Slow healing wounds: No  Complications assessed  "today?: No  Autonomic neuropathy: No  CVA: No  Heart disease: No  Nephropathy: No  Peripheral neuropathy: No  Peripheral Vascular Disease: No  Retinopathy: No  Sexual dysfunction: No    Patient Problem List and Family Medical History reviewed for relevant medical history, current medical status, and diabetes risk factors.    Vitals:  Wt 86.1 kg (189 lb 12.8 oz)   LMP 10/14/2015   BMI 33.62 kg/m    Estimated body mass index is 33.62 kg/m  as calculated from the following:    Height as of 4/18/22: 1.6 m (5' 3\").    Weight as of this encounter: 86.1 kg (189 lb 12.8 oz).   Last 3 BP:   BP Readings from Last 3 Encounters:   04/21/22 118/72   04/18/22 129/75   04/04/22 115/75       History   Smoking Status     Never Smoker   Smokeless Tobacco     Never Used       Labs:  Lab Results   Component Value Date    A1C 12.5 03/18/2022    A1C 7.3 07/17/2017     Lab Results   Component Value Date     04/21/2022     07/17/2017     Lab Results   Component Value Date     04/21/2022     07/17/2017     HDL Cholesterol   Date Value Ref Range Status   07/17/2017 51 >49 mg/dL Final     Direct Measure HDL   Date Value Ref Range Status   04/21/2022 59 >=50 mg/dL Final   ]  GFR Estimate   Date Value Ref Range Status   04/21/2022 >90 >60 mL/min/1.73m2 Final     Comment:     Effective December 21, 2021 eGFRcr in adults is calculated using the 2021 CKD-EPI creatinine equation which includes age and gender (Maria Fernanda aguayo al., NEJ, DOI: 10.1056/NAINkv9406878)   07/17/2017 >90  Non  GFR Calc   >60 mL/min/1.7m2 Final     GFR Estimate If Black   Date Value Ref Range Status   07/17/2017 >90   GFR Calc   >60 mL/min/1.7m2 Final     Lab Results   Component Value Date    CR 0.61 04/21/2022    CR 0.65 07/17/2017     No results found for: MICROALBUMIN    Healthy Eating:  Healthy Eating Assessed Today: Yes  Cultural/Voodoo diet restrictions?: No  Meal planning/habits: Carb counting, Smaller " portions  How many times a week on average do you eat food made away from home (restaurant/take-out)?: 1  Meals include: Breakfast, Lunch, Dinner, Afternoon Snack  Beverages: Water, Tea, Coffee, Other  Has patient met with a dietitian in the past?: Yes    Being Active:  Being Active Assessed Today: Yes  Exercise:: Yes  Days per week of moderate to strenuous exercise (like a brisk walk): (P) 3  On average, minutes per day of exercise at this level: (P) 40  How intense was your typical exercise? : (P) Moderate (like brisk walking)  Exercise Minutes per Week: (P) 120  Barrier to exercise: None    Monitoring:  Monitoring Assessed Today: Yes  Did patient bring glucose meter to appointment? : Yes  Blood Glucose Meter: One Touch, CGM  Times checking blood sugar at home (number): 5+  Times checking blood sugar at home (per): Day  Blood glucose trend: Fluctuating    Glucose data:                Taking Medications:  Diabetes Medication(s)     Biguanides       metFORMIN (GLUCOPHAGE XR) 500 MG 24 hr tablet    Take 4 tablets (2,000 mg) by mouth daily (with dinner) Increase by 1 tablet every 4-7 days until at goal of 4 tablets daily    Diabetic Other       glucose (BD GLUCOSE) 4 g chewable tablet    Take 4 tablets by mouth every 15 minutes as needed for low blood sugar    Insulin       insulin glargine (LANTUS PEN) 100 UNIT/ML pen    Inject 14 Units Subcutaneous every morning (before breakfast)    Incretin Mimetic Agents (GLP-1 Receptor Agonists)       dulaglutide (TRULICITY) 1.5 MG/0.5ML pen    Inject 1.5 mg Subcutaneous every 7 days          Taking Medication Assessed Today: Yes  Current Treatments: Diet, Insulin Injections, Oral Medication (taken by mouth), Non-insulin Injectables  Dose schedule: Pre-breakfast  Given by: Patient  Injection/Infusion sites: Abdomen  Problems taking diabetes medications regularly?: No  Diabetes medication side effects?: No    Problem Solving:  Problem Solving Assessed Today: No  Is the patient at  risk for hypoglycemia?: Yes  Hypoglycemia Frequency: Never  Medical ID: No  Does patient have glucagon emergency kit?: No  Is the patient at risk for DKA?: No  Does patient have severe weather/disaster plan for diabetes management?: No  Does patient have sick day plan for diabetes management?: No              Reducing Risks:  Reducing Risks Assessed Today: Yes  Diabetes Risks: Age over 45 years, Ethnicity  CAD Risks: Obesity, Diabetes Mellitus  Has dilated eye exam at least once a year?: Yes  Sees dentist every 6 months?: Yes  Feet checked by healthcare provider in the last year?: Yes    Healthy Coping:  Healthy Coping Assessed Today: Yes  Emotional response to diabetes: Ready to learn, Concern for health and well-being  Informal Support system:: Children  Stage of change: ACTION (Actively working towards change)  Support resources: None  Patient Activation Measure Survey Score:  TRAVIS Score (Last Two) 11/9/2012 4/14/2014   TRAVIS Raw Score 44 43   Activation Score 70.8 68.5   TRAVIS Level 4 4             Taina Cam RD, LD, [facility]   Time Spent: 45 minutes  Encounter Type: Individual        Any diabetes medication dose changes were made via the Certified Diabetes Care & Education Protocol in collaboration with the patient's referring provider. A copy of this encounter was shared with the provider.

## 2022-06-24 ENCOUNTER — OFFICE VISIT (OUTPATIENT)
Dept: NEUROSURGERY | Facility: CLINIC | Age: 57
End: 2022-06-24
Payer: COMMERCIAL

## 2022-06-24 VITALS — HEART RATE: 102 BPM | OXYGEN SATURATION: 98 % | DIASTOLIC BLOOD PRESSURE: 76 MMHG | SYSTOLIC BLOOD PRESSURE: 110 MMHG

## 2022-06-24 DIAGNOSIS — Z98.890 HISTORY OF BURR HOLE SURGERY: Primary | ICD-10-CM

## 2022-06-24 PROCEDURE — 99207 PR NO CHARGE LOS: CPT | Performed by: NEUROLOGICAL SURGERY

## 2022-06-24 ASSESSMENT — PAIN SCALES - GENERAL: PAINLEVEL: NO PAIN (0)

## 2022-06-24 NOTE — LETTER
[facility]  Neurosurgery Clinic  [address] [place] [address]  [id number]        June 24, 2022     To Whom it May Concern,      Mary Franco is being seen at our clinic for care. She is to work from home for 3 months until 9/24/22.    Please call our clinic with questions or concerns: [phone]      Sincerely,    Austin Ortega MD  (Electronically Signed, June 24, 2022, 1332)

## 2022-06-24 NOTE — LETTER
"    6/24/2022         RE: Mary Franco  [address]  [place] [address]        Dear Colleague,    Thank you for referring your patient, Mary Franco, to the [facility] NEUROLOGY [facility]. Please see a copy of my visit note below.    It was a pleasure to see Mary Franco today in Neurosurgery Clinic. She is a 56 year old female who underwent:    Procedure Date: 03/20/2022     PREOPERATIVE DIAGNOSIS:  Right chronic subdural hematoma.     POSTOPERATIVE DIAGNOSIS:  Right chronic subdural hematoma.     PROCEDURE:  Right stacey hole for evacuation of chronic subdural hematoma.     SURGEON:  Austin Ortega MD     ANESTHESIA:  General endotracheal anesthesia plus local anesthetic.     ESTIMATED BLOOD LOSS:  5 mL     She is doing reasonably well.  She had a follow-up CT in April which showed resolution of the hematoma.    She has been working from home because she has had difficulty tolerating in person work since the surgery.  She occasionally gets some headaches but otherwise denies any significant symptoms.    Vitals:    06/24/22 1320   BP: 110/76   Pulse: 102   SpO2: 98%     Estimated body mass index is 33.62 kg/m  as calculated from the following:    Height as of 4/18/22: 1.6 m (5' 3\").    Weight as of 6/10/22: 86.1 kg (189 lb 12.8 oz).  No Pain (0)    Well-healed incision.  Bilateral upper and lower extremity strength is 5 out of 5 in all muscle groups.    Imaging: April CT was reviewed which showed resolution of the hematoma.  The imaging was reviewed with patient shown to the patient clinic today.    Assessment: Status post bur hole for chronic subdural hematoma  I think she is doing well and may follow-up as needed.    Plan: I have extended her remote work letter for another 3 months as I think this is quite reasonable but do think that in the long-term she will hopefully be able to go back to in person work.   however if she needs ongoing permanent restrictions I have asked her to " speak to her regular doctor.  She may follow-up with us on an as-needed basis.         Again, thank you for allowing me to participate in the care of your patient.        Sincerely,        Austin Ortega MD

## 2022-06-24 NOTE — PATIENT INSTRUCTIONS
Patient Next Steps:    Letter drafted ok'd to [facility] for 3 months. After that point, PCP to handle any work letters if needed.     Please call us if you have any further questions or concerns.    [facility] Neurosurgery Clinic   Phone: [phone]  Fax: [phone]

## 2022-06-24 NOTE — PROGRESS NOTES
"It was a pleasure to see Mary Franoc today in Neurosurgery Clinic. She is a 56 year old female who underwent:    Procedure Date: 03/20/2022     PREOPERATIVE DIAGNOSIS:  Right chronic subdural hematoma.     POSTOPERATIVE DIAGNOSIS:  Right chronic subdural hematoma.     PROCEDURE:  Right stacey hole for evacuation of chronic subdural hematoma.     SURGEON:  Austin Ortega MD     ANESTHESIA:  General endotracheal anesthesia plus local anesthetic.     ESTIMATED BLOOD LOSS:  5 mL     She is doing reasonably well.  She had a follow-up CT in April which showed resolution of the hematoma.    She has been working from home because she has had difficulty tolerating in person work since the surgery.  She occasionally gets some headaches but otherwise denies any significant symptoms.    Vitals:    06/24/22 1320   BP: 110/76   Pulse: 102   SpO2: 98%     Estimated body mass index is 33.62 kg/m  as calculated from the following:    Height as of 4/18/22: 1.6 m (5' 3\").    Weight as of 6/10/22: 86.1 kg (189 lb 12.8 oz).  No Pain (0)    Well-healed incision.  Bilateral upper and lower extremity strength is 5 out of 5 in all muscle groups.    Imaging: April CT was reviewed which showed resolution of the hematoma.  The imaging was reviewed with patient shown to the patient clinic today.    Assessment: Status post bur hole for chronic subdural hematoma  I think she is doing well and may follow-up as needed.    Plan: I have extended her remote work letter for another 3 months as I think this is quite reasonable but do think that in the long-term she will hopefully be able to go back to in person work.   however if she needs ongoing permanent restrictions I have asked her to speak to her regular doctor.  She may follow-up with us on an as-needed basis.     "

## 2022-08-03 ENCOUNTER — OFFICE VISIT (OUTPATIENT)
Dept: FAMILY MEDICINE | Facility: CLINIC | Age: 57
End: 2022-08-03
Attending: PHYSICIAN ASSISTANT
Payer: COMMERCIAL

## 2022-08-03 DIAGNOSIS — D48.9 NEOPLASM OF UNCERTAIN BEHAVIOR: ICD-10-CM

## 2022-08-03 PROCEDURE — 11102 TANGNTL BX SKIN SINGLE LES: CPT | Performed by: NURSE PRACTITIONER

## 2022-08-03 PROCEDURE — 88305 TISSUE EXAM BY PATHOLOGIST: CPT | Performed by: DERMATOLOGY

## 2022-08-03 ASSESSMENT — PAIN SCALES - GENERAL: PAINLEVEL: NO PAIN (0)

## 2022-08-03 NOTE — LETTER
8/3/2022         RE: Mary Franco  [address]  [place] [address]        Dear Colleague,    Thank you for referring your patient, Mary Franco, to the [facility] [place]. Please see a copy of my visit note below.    [facility] Dermatology Note  Encounter Date: Aug 3, 2022  Office Visit     Reviewed patients past medical history and pertinent chart review prior to patients visit today.     Dermatology Problem List:  Patient denies personal history of skin cancer or dysplastic nevi.    Patient denies family history of skin cancer or dysplastic nevi.      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 Shave biopsy today.    Procedure Note: Biopsy by shave technique  The risks and benefits of the procedure were described to the patient. These include but are not limited to bleeding, infection, scar, incomplete removal, and non-diagnostic biopsy. Verbal informed consent was obtained. The above site(s) was cleansed with an alcohol pad and injected with 1% lidocaine with epinephrine. Once anesthesia was obtained, a biopsy(ies) was performed with Gilette blade. The tissue(s) was placed in a labeled container(s) with formalin and sent to pathology. Hemostasis was achieved with aluminum chloride. Vaseline and a bandage were applied to the wound(s). The patient tolerated the procedure well and was given post biopsy care instructions.     Belinda Martínez, APRN CNP on 8/3/2022 at 2:55 PM   _______________________________________    CC: Derm Problem (Lesion on left ear)    HPI:  Ms. Mary Franco is a(n) 56 year old female who presents today as a new patient for a spot on the left ear. In about January of this year it seemed to blister and then peel. It peeled again in about April. Since then it has not done  anything, has not changed in size, and is asymptomatic.     Patient is otherwise feeling well, without additional skin concerns.      Physical Exam:  SKIN: Focused examination of ears was performed.  - left mid helix, 6 mm darkened brown pigmented macule without pigment network    - No other lesions of concern on areas examined.     Medications:  Current Outpatient Medications   Medication     APPLE CIDER VINEGAR PO     atorvastatin (LIPITOR) 20 MG tablet     calcium-magnesium (CALMAG) 500-250 MG TABS     cetirizine (ZYRTEC ALLERGY) 10 MG tablet     dulaglutide (TRULICITY) 1.5 MG/0.5ML pen     glucose (BD GLUCOSE) 4 g chewable tablet     insulin glargine (LANTUS PEN) 100 UNIT/ML pen     insulin pen needle (32G X 4 MM) 32G X 4 MM miscellaneous     metFORMIN (GLUCOPHAGE XR) 500 MG 24 hr tablet     mometasone (NASONEX) 50 MCG/ACT nasal spray     multivitamin, therapeutic (THERA-VIT) TABS     Omega-3 Fatty Acids (OMEGA-3 FISH OIL PO)     Sharps Container MISC     VITAMIN E PO     Alcohol Swabs PADS     blood glucose (NO BRAND SPECIFIED) lancets standard     blood glucose (NO BRAND SPECIFIED) lancets standard     blood glucose (NO BRAND SPECIFIED) test strip     blood glucose calibration (NO BRAND SPECIFIED) solution     blood glucose monitoring (NO BRAND SPECIFIED) meter device kit     Continuous Blood Gluc Sensor (FREESTYLE YVETTE 2 SENSOR) [facility]     No current facility-administered medications for this visit.      Past Medical History:   Patient Active Problem List   Diagnosis     Subdural hematoma (H)     Mild intermittent asthma     History of cranioplasty     Obesity     Seasonal allergies     Type 2 diabetes mellitus without complication, without long-term current use of insulin (H)     Hyperlipidemia LDL goal <100     Iron deficiency anemia, unspecified iron deficiency anemia type     Past Medical History:   Diagnosis Date     Elevated blood sugar 2015     Obesity      Seasonal allergies      Subdural haemorrhage  2015    Large left-sided     Uncomplicated asthma            CC Zafar Jerome PA-C  [address] [place] [address] on close of this encounter.       Again, thank you for allowing me to participate in the care of your patient.        Sincerely,        JOSE Robb CNP

## 2022-08-03 NOTE — PROGRESS NOTES
[facility] Dermatology Note  Encounter Date: Aug 3, 2022  Office Visit     Reviewed patients past medical history and pertinent chart review prior to patients visit today.     Dermatology Problem List:  Patient denies personal history of skin cancer or dysplastic nevi.    Patient denies family history of skin cancer or dysplastic nevi.      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 Shave biopsy today.    Procedure Note: Biopsy by shave technique  The risks and benefits of the procedure were described to the patient. These include but are not limited to bleeding, infection, scar, incomplete removal, and non-diagnostic biopsy. Verbal informed consent was obtained. The above site(s) was cleansed with an alcohol pad and injected with 1% lidocaine with epinephrine. Once anesthesia was obtained, a biopsy(ies) was performed with Gilette blade. The tissue(s) was placed in a labeled container(s) with formalin and sent to pathology. Hemostasis was achieved with aluminum chloride. Vaseline and a bandage were applied to the wound(s). The patient tolerated the procedure well and was given post biopsy care instructions.     Belinda Martínez, JOSE CNP on 8/3/2022 at 2:55 PM   _______________________________________    CC: Derm Problem (Lesion on left ear)    HPI:  Ms. Mary Franco is a(n) 56 year old female who presents today as a new patient for a spot on the left ear. In about January of this year it seemed to blister and then peel. It peeled again in about April. Since then it has not done anything, has not changed in size, and is asymptomatic.     Patient is otherwise feeling well, without additional skin concerns.      Physical Exam:  SKIN: Focused examination of ears was performed.  - left mid helix, 6 mm darkened brown pigmented macule without  pigment network    - No other lesions of concern on areas examined.     Medications:  Current Outpatient Medications   Medication     APPLE CIDER VINEGAR PO     atorvastatin (LIPITOR) 20 MG tablet     calcium-magnesium (CALMAG) 500-250 MG TABS     cetirizine (ZYRTEC ALLERGY) 10 MG tablet     dulaglutide (TRULICITY) 1.5 MG/0.5ML pen     glucose (BD GLUCOSE) 4 g chewable tablet     insulin glargine (LANTUS PEN) 100 UNIT/ML pen     insulin pen needle (32G X 4 MM) 32G X 4 MM miscellaneous     metFORMIN (GLUCOPHAGE XR) 500 MG 24 hr tablet     mometasone (NASONEX) 50 MCG/ACT nasal spray     multivitamin, therapeutic (THERA-VIT) TABS     Omega-3 Fatty Acids (OMEGA-3 FISH OIL PO)     Sharps Container [facility] [place]     VITAMIN E PO     Alcohol Swabs PADS     blood glucose (NO BRAND SPECIFIED) lancets standard     blood glucose (NO BRAND SPECIFIED) lancets standard     blood glucose (NO BRAND SPECIFIED) test strip     blood glucose calibration (NO BRAND SPECIFIED) solution     blood glucose monitoring (NO BRAND SPECIFIED) meter device kit     Continuous Blood Gluc Sensor (FREESTYLE YVETTE 2 SENSOR) [facility] [place]     No current facility-administered medications for this visit.      Past Medical History:   Patient Active Problem List   Diagnosis     Subdural hematoma (H)     Mild intermittent asthma     History of cranioplasty     Obesity     Seasonal allergies     Type 2 diabetes mellitus without complication, without long-term current use of insulin (H)     Hyperlipidemia LDL goal <100     Iron deficiency anemia, unspecified iron deficiency anemia type     Past Medical History:   Diagnosis Date     Elevated blood sugar 2015     Obesity      Seasonal allergies      Subdural haemorrhage 2015    Large left-sided     Uncomplicated asthma            CC Zafar Jerome PA-C  [address] [place] [address] [place] [phone] on close of this encounter.

## 2022-08-05 LAB
PATH REPORT.COMMENTS IMP SPEC: NORMAL
PATH REPORT.COMMENTS IMP SPEC: NORMAL
PATH REPORT.FINAL DX SPEC: NORMAL
PATH REPORT.GROSS SPEC: NORMAL
PATH REPORT.MICROSCOPIC SPEC OTHER STN: NORMAL
PATH REPORT.RELEVANT HX SPEC: NORMAL

## 2022-08-29 ENCOUNTER — ALLIED HEALTH/NURSE VISIT (OUTPATIENT)
Dept: EDUCATION SERVICES | Facility: CLINIC | Age: 57
End: 2022-08-29
Payer: COMMERCIAL

## 2022-08-29 DIAGNOSIS — E11.9 TYPE 2 DIABETES MELLITUS WITHOUT COMPLICATION, WITHOUT LONG-TERM CURRENT USE OF INSULIN (H): Primary | ICD-10-CM

## 2022-08-29 PROCEDURE — 99207 PR NO CHARGE LOS: CPT | Performed by: DIETITIAN, REGISTERED

## 2022-08-29 NOTE — LETTER
8/29/2022         RE: Mary Franco  [address]  [place] [address]        Dear Colleague,    Thank you for referring your patient, Mary Franco, to the [facility] [place]. Please see a copy of my visit note below.    Diabetes Self-Management Education & Support    Presents for: CGM Review    CDE VISIT MODE: In Person    Assessment Type:   REPORTS:                Pt verbalized understanding of concepts discussed and recommendations provided today.       Continue education with the following diabetes management concepts: Healthy Coping    ASSESSMENT:  Mary  Feels things are going well. Stopped her insulin on her own as she started seeing more low blood sugars as she went up on metformin & Trulicity dosing. Her blood sugar trends are looking excellent so agreed that insulin is no longer necessary. Will inform PCP that this has been stopped by patient. She is due for A1C recheck and encouraged her to get this done, follow up with PCP next, diabetes ed annually.      Glucose Patterns & Trends:  No clear patterns identified      PLAN    Get A1C rechecked every 3-6 months  Follow up with PCP   No need to add insulin back in, continue with current metformin & Trulicity dosing     Topics to cover at upcoming visits: Healthy Coping    Follow-up: annually or sooner, as needed     See Care Plan for co-developed, patient-state behavior change goals.  AVS provided for patient today.    Education Materials Provided:  No new materials provided today      SUBJECTIVE/OBJECTIVE:  Presents for: CGM Review  Accompanied by: Self  Diabetes education in the past 24mo: Yes  Focus of Visit: Monitoring, Taking Medication, Assistance w/ making life changes, CGM  Type of CGM visit: Personal CGM Follow-up  Diabetes type: Type 2  Disease course: Improving  How confident are you filling out medical forms by yourself:: Extremely  Diabetes management related comments/concerns: Has noticed a change in  "appetite, has to remind herself to eat at times  Transportation concerns: No  Difficulty affording diabetes medication?: No  Difficulty affording diabetes testing supplies?: No  Other concerns:: None  Cultural Influences/Ethnic Background:  Not  or       Diabetes Symptoms & Complications:  Fatigue: Sometimes  Neuropathy: No  Polydipsia: No  Polyphagia: No  Polyuria: No  Visual change: No  Slow healing wounds: No  Complications assessed today?: No  Autonomic neuropathy: No  CVA: No  Heart disease: No  Nephropathy: No  Peripheral neuropathy: No  Peripheral Vascular Disease: No  Retinopathy: No  Sexual dysfunction: No    Patient Problem List and Family Medical History reviewed for relevant medical history, current medical status, and diabetes risk factors.    Vitals:  LMP 10/14/2015   Estimated body mass index is 33.62 kg/m  as calculated from the following:    Height as of 4/18/22: 1.6 m (5' 3\").    Weight as of 6/10/22: 86.1 kg (189 lb 12.8 oz).   Last 3 BP:   BP Readings from Last 3 Encounters:   06/24/22 110/76   04/21/22 118/72   04/18/22 129/75       History   Smoking Status     Never Smoker   Smokeless Tobacco     Never Used       Labs:  Lab Results   Component Value Date    A1C 12.5 03/18/2022    A1C 7.3 07/17/2017     Lab Results   Component Value Date     04/21/2022     07/17/2017     Lab Results   Component Value Date     04/21/2022     07/17/2017     HDL Cholesterol   Date Value Ref Range Status   07/17/2017 51 >49 mg/dL Final     Direct Measure HDL   Date Value Ref Range Status   04/21/2022 59 >=50 mg/dL Final   ]  GFR Estimate   Date Value Ref Range Status   04/21/2022 >90 >60 mL/min/1.73m2 Final     Comment:     Effective December 21, 2021 eGFRcr in adults is calculated using the 2021 CKD-EPI creatinine equation which includes age and gender (Maria Fernanda aguayo al., NEJ, DOI: 10.1056/GZMIiy0378346)   07/17/2017 >90  Non  GFR Calc   >60 mL/min/1.7m2 Final "     GFR Estimate If Black   Date Value Ref Range Status   07/17/2017 >90   GFR Calc   >60 mL/min/1.7m2 Final     Lab Results   Component Value Date    CR 0.61 04/21/2022    CR 0.65 07/17/2017     No results found for: MICROALBUMIN    Healthy Eating:  Healthy Eating Assessed Today: No  Cultural/Scientology diet restrictions?: No  Meal planning/habits: Carb counting, Smaller portions  How many times a week on average do you eat food made away from home (restaurant/take-out)?: 1  Meals include: Breakfast, Lunch, Dinner, Afternoon Snack  Beverages: Water, Tea, Coffee, Other  Has patient met with a dietitian in the past?: Yes    Being Active:  Being Active Assessed Today: No  Exercise:: Yes  Days per week of moderate to strenuous exercise (like a brisk walk): 3  On average, minutes per day of exercise at this level: 40  How intense was your typical exercise? : Moderate (like brisk walking)  Exercise Minutes per Week: 120  Barrier to exercise: None    Monitoring:  Monitoring Assessed Today: Yes  Did patient bring glucose meter to appointment? : Yes  Blood Glucose Meter: One Touch, CGM  Times checking blood sugar at home (number): 5+  Times checking blood sugar at home (per): Day  Blood glucose trend: Fluctuating    See LibreView report above     Taking Medications:  Diabetes Medication(s)     Biguanides       metFORMIN (GLUCOPHAGE XR) 500 MG 24 hr tablet    Take 4 tablets (2,000 mg) by mouth daily (with dinner) Increase by 1 tablet every 4-7 days until at goal of 4 tablets daily    Diabetic Other       glucose (BD GLUCOSE) 4 g chewable tablet    Take 4 tablets by mouth every 15 minutes as needed for low blood sugar    Insulin       insulin glargine (LANTUS PEN) 100 UNIT/ML pen    Inject 14 Units Subcutaneous every morning (before breakfast)    Incretin Mimetic Agents (GLP-1 Receptor Agonists)       dulaglutide (TRULICITY) 1.5 MG/0.5ML pen    Inject 1.5 mg Subcutaneous every 7 days          Taking Medication  Assessed Today: Yes  Current Treatments: Diet, Insulin Injections, Oral Medication (taken by mouth), Non-insulin Injectables  Dose schedule: Pre-breakfast  Given by: Patient  Injection/Infusion sites: Abdomen  Problems taking diabetes medications regularly?: No  Diabetes medication side effects?: No    Problem Solving:  Problem Solving Assessed Today: No  Is the patient at risk for hypoglycemia?: Yes  Hypoglycemia Frequency: Never  Medical ID: No  Does patient have glucagon emergency kit?: No  Is the patient at risk for DKA?: No  Does patient have severe weather/disaster plan for diabetes management?: No  Does patient have sick day plan for diabetes management?: No              Reducing Risks:  Reducing Risks Assessed Today: No  Diabetes Risks: Age over 45 years, Ethnicity  CAD Risks: Obesity, Diabetes Mellitus  Has dilated eye exam at least once a year?: Yes  Sees dentist every 6 months?: Yes  Feet checked by healthcare provider in the last year?: Yes    Healthy Coping:  Healthy Coping Assessed Today: Yes  Emotional response to diabetes: Ready to learn, Concern for health and well-being  Informal Support system:: Children  Stage of change: MAINTENANCE (Working to maintain change, with risk of relapse)  Support resources: None  Patient Activation Measure Survey Score:  TRAVIS Score (Last Two) 11/9/2012 4/14/2014   TRAVIS Raw Score 44 43   Activation Score 70.8 68.5   TRAVIS Level 4 4         Care Plan and Education Provided:  Care Plan: Diabetes   Updates made by Taina Cam RD since 8/29/2022 12:00 AM      Problem: HbA1C Not In Goal       Goal: Establish Regular Follow-Ups with PCP       Task: Discuss with PCP the recommended timing for patient's next follow up visit(s)    Responsible User: Taina Cam RD      Task: Discuss schedule for PCP visits with patient    Responsible User: Taina Cam RD      Goal: Get HbA1C Level in Goal       Task: Educate patient on diabetes education self-management topics     Responsible User: Taina Cam RD      Task: Educate patient on benefits of regular glucose monitoring    Responsible User: Taina Cam RD      Task: Refer patient to appropriate extended care team member, as needed (Medication Therapy Management, Behavioral Health, Physical Therapy, etc.)    Responsible User: Taina Cam RD      Task: Discuss diabetes treatment plan with patient    Responsible User: Taina Cam RD      Problem: Diabetes Self-Management Education Needed to Optimize Self-Care Behaviors       Goal: Understand diabetes pathophysiology and disease progression       Task: Provide education on diabetes pathophysiology and disease progression specfic to patient's diabetes type    Responsible User: Taina Cam RD      Goal: Healthy Eating - follow a healthy eating pattern for diabetes       Task: Provide education on portion control and consistency in amount, composition and timing of food intake    Responsible User: Taina Cam RD      Task: Provide education on managing carbohydrate intake (carbohydrate counting, plate planning method, etc.)    Responsible User: Taina Cam RD      Task: Provide education on weight management    Responsible User: Taina Cam RD      Task: Provide education on heart healthy eating    Responsible User: Taina Cam RD      Task: Provide education on eating out    Responsible User: Taina Cam RD      Task: Develop individualized healthy eating plan with patient    Responsible User: Taina Cam RD      Goal: Being Active - get regular physical activity, working up to at least 150 minutes per week       Task: Provide education on relationship of activity to glucose and precautions to take if at risk for low glucose    Responsible User: Taina Cam RD      Task: Discuss barriers to physical activity with patient    Responsible User: Taina Cam RD      Task: Develop physical activity plan with patient    Responsible User: Taina Cam  CHEVY      Task: Explore community resources including walking groups, assistance programs, and home videos    Responsible User: Taina Cam RD      Goal: Monitoring - monitor glucose and ketones as directed       Task: Provide education on blood glucose monitoring (purpose, proper technique, frequency, glucose targets, interpreting results, when to use glucose control solution, sharps disposal)    Responsible User: Taina Cam RD      Task: Provide education on continuous glucose monitoring (sensor placement, use of leora or /reader, understanding glucose trends, alerts and alarms, differences between sensor glucose and blood glucose) Completed 8/29/2022   Responsible User: Taina Cam RD      Task: Provide education on ketone monitoring (when to monitor, frequency, etc.)    Responsible User: Taina Cam RD      Goal: Taking Medication - patient is consistently taking medications as directed       Task: Provide education on action of prescribed medication, including when to take and possible side effects Completed 8/29/2022   Responsible User: Taina Cam RD      Task: Provide education on insulin and injectable diabetes medications, including administration, storage, site selection and rotation for injection sites    Responsible User: Taina Cam RD      Task: Discuss barriers to medication adherence with patient and provide management technique ideas as appropriate    Responsible User: Taina Cam RD      Task: Provide education on frequency and refill details of medications    Responsible User: Taina Cam RD      Goal: Problem Solving - know how to prevent and manage short-term diabetes complications       Task: Provide education on high blood glucose - causes, signs/symptoms, prevention and treatment    Responsible User: Taina Cam RD      Task: Provide education on low blood glucose - causes, signs/symptoms, prevention, treatment, carrying a carbohydrate source at all times, and  medical identification    Responsible User: Taina Cam RD      Task: Provide education on safe travel with diabetes    Responsible User: Taina Cam RD      Task: Provide education on how to care for diabetes on sick days    Responsible User: Taina Cam RD      Task: Provide education on when to call a health care provider    Responsible User: Taina Cam RD      Goal: Reducing Risks - know how to prevent and treat long-term diabetes complications       Task: Provide education on major complications of diabetes, prevention, early diagnostic measures and treatment of complications    Responsible User: Taina Cam RD      Task: Provide education on recommended care for dental, eye and foot health    Responsible User: Taina Cam RD      Task: Provide education on Hemoglobin A1c - goals and relationship to blood glucose levels Completed 8/29/2022   Responsible User: Taina Cam RD      Task: Provide education on recommendations for heart health - lipid levels and goals, blood pressure and goals, and aspirin therapy, if indicated    Responsible User: Taina Cam RD      Task: Provide education on tobacco cessation    Responsible User: Taina Cam RD      Goal: Healthy Coping - use available resources to cope with the challenges of managing diabetes       Task: Discuss recognizing feelings about having diabetes    Responsible User: Taina Cam RD      Task: Provide education on the benefits of making appropriate lifestyle changes    Responsible User: Taina Cam RD      Task: Provide education on benefits of utilizing support systems    Responsible User: Taina Cam RD      Task: Discuss methods for coping with stress    Responsible User: Taina Cam RD      Task: Provide education on when to seek professional counseling    Responsible User: Taina Cam RD Elise Graham, RD, JANA, CDCES     Time Spent: 15 minutes  Encounter Type: Individual    Any diabetes medication dose  changes were made via the CDE Protocol per the patient's referring provider. A copy of this encounter was shared with the provider.

## 2022-08-29 NOTE — PROGRESS NOTES
Diabetes Self-Management Education & Support    Presents for: CGM Review    CDE VISIT MODE: In Person    Assessment Type:   REPORTS:                Pt verbalized understanding of concepts discussed and recommendations provided today.       Continue education with the following diabetes management concepts: Healthy Coping    ASSESSMENT:  Mary  Feels things are going well. Stopped her insulin on her own as she started seeing more low blood sugars as she went up on metformin & Trulicity dosing. Her blood sugar trends are looking excellent so agreed that insulin is no longer necessary. Will inform PCP that this has been stopped by patient. She is due for A1C recheck and encouraged her to get this done, follow up with PCP next, diabetes ed annually.      Glucose Patterns & Trends:  No clear patterns identified      PLAN    Get A1C rechecked every 3-6 months  Follow up with PCP   No need to add insulin back in, continue with current metformin & Trulicity dosing     Topics to cover at upcoming visits: Healthy Coping    Follow-up: annually or sooner, as needed     See Care Plan for co-developed, patient-state behavior change goals.  AVS provided for patient today.    Education Materials Provided:  No new materials provided today      SUBJECTIVE/OBJECTIVE:  Presents for: CGM Review  Accompanied by: Self  Diabetes education in the past 24mo: Yes  Focus of Visit: Monitoring, Taking Medication, Assistance w/ making life changes, CGM  Type of CGM visit: Personal CGM Follow-up  Diabetes type: Type 2  Disease course: Improving  How confident are you filling out medical forms by yourself:: Extremely  Diabetes management related comments/concerns: Has noticed a change in appetite, has to remind herself to eat at times  Transportation concerns: No  Difficulty affording diabetes medication?: No  Difficulty affording diabetes testing supplies?: No  Other concerns:: None  Cultural Influences/Ethnic Background:  Not  or  "      Diabetes Symptoms & Complications:  Fatigue: Sometimes  Neuropathy: No  Polydipsia: No  Polyphagia: No  Polyuria: No  Visual change: No  Slow healing wounds: No  Complications assessed today?: No  Autonomic neuropathy: No  CVA: No  Heart disease: No  Nephropathy: No  Peripheral neuropathy: No  Peripheral Vascular Disease: No  Retinopathy: No  Sexual dysfunction: No    Patient Problem List and Family Medical History reviewed for relevant medical history, current medical status, and diabetes risk factors.    Vitals:  [facility] 10/14/2015   Estimated body mass index is 33.62 kg/m  as calculated from the following:    Height as of 4/18/22: 1.6 m (5' 3\").    Weight as of 6/10/22: 86.1 kg (189 lb 12.8 oz).   Last 3 BP:   BP Readings from Last 3 Encounters:   06/24/22 110/76   04/21/22 118/72   04/18/22 129/75       History   Smoking Status     Never Smoker   Smokeless Tobacco     Never Used       Labs:  Lab Results   Component Value Date    A1C 12.5 03/18/2022    A1C 7.3 07/17/2017     Lab Results   Component Value Date     04/21/2022     07/17/2017     Lab Results   Component Value Date     04/21/2022     07/17/2017     HDL Cholesterol   Date Value Ref Range Status   07/17/2017 51 >49 mg/dL Final     Direct Measure HDL   Date Value Ref Range Status   04/21/2022 59 >=50 mg/dL Final   ]  GFR Estimate   Date Value Ref Range Status   04/21/2022 >90 >60 mL/min/1.73m2 Final     Comment:     Effective December 21, 2021 eGFRcr in adults is calculated using the 2021 CKD-EPI creatinine equation which includes age and gender (Maria Fernanda aguayo al., NEJ, DOI: 10.1056/OEWMhg2600954)   07/17/2017 >90  Non  GFR Calc   >60 mL/min/1.7m2 Final     GFR Estimate If Black   Date Value Ref Range Status   07/17/2017 >90   GFR Calc   >60 mL/min/1.7m2 Final     Lab Results   Component Value Date    CR 0.61 04/21/2022    CR 0.65 07/17/2017     No results found for: MICROALBUMIN    Healthy " Eating:  Healthy Eating Assessed Today: No  Cultural/Nondenominational diet restrictions?: No  Meal planning/habits: Carb counting, Smaller portions  How many times a week on average do you eat food made away from home (restaurant/take-out)?: 1  Meals include: Breakfast, Lunch, Dinner, Afternoon Snack  Beverages: Water, Tea, Coffee, Other  Has patient met with a dietitian in the past?: Yes    Being Active:  Being Active Assessed Today: No  Exercise:: Yes  Days per week of moderate to strenuous exercise (like a brisk walk): 3  On average, minutes per day of exercise at this level: 40  How intense was your typical exercise? : Moderate (like brisk walking)  Exercise Minutes per Week: 120  Barrier to exercise: None    Monitoring:  Monitoring Assessed Today: Yes  Did patient bring glucose meter to appointment? : Yes  Blood Glucose Meter: One Touch, CGM  Times checking blood sugar at home (number): 5+  Times checking blood sugar at home (per): Day  Blood glucose trend: Fluctuating    See LibreView report above     Taking Medications:  Diabetes Medication(s)     Biguanides       metFORMIN (GLUCOPHAGE XR) 500 MG 24 hr tablet    Take 4 tablets (2,000 mg) by mouth daily (with dinner) Increase by 1 tablet every 4-7 days until at goal of 4 tablets daily    Diabetic Other       glucose (BD GLUCOSE) 4 g chewable tablet    Take 4 tablets by mouth every 15 minutes as needed for low blood sugar    Insulin       insulin glargine (LANTUS PEN) 100 UNIT/ML pen    Inject 14 Units Subcutaneous every morning (before breakfast)    Incretin Mimetic Agents (GLP-1 Receptor Agonists)       dulaglutide (TRULICITY) 1.5 MG/0.5ML pen    Inject 1.5 mg Subcutaneous every 7 days          Taking Medication Assessed Today: Yes  Current Treatments: Diet, Insulin Injections, Oral Medication (taken by mouth), Non-insulin Injectables  Dose schedule: Pre-breakfast  Given by: Patient  Injection/Infusion sites: Abdomen  Problems taking diabetes medications regularly?:  No  Diabetes medication side effects?: No    Problem Solving:  Problem Solving Assessed Today: No  Is the patient at risk for hypoglycemia?: Yes  Hypoglycemia Frequency: Never  Medical ID: No  Does patient have glucagon emergency kit?: No  Is the patient at risk for DKA?: No  Does patient have severe weather/disaster plan for diabetes management?: No  Does patient have sick day plan for diabetes management?: No              Reducing Risks:  Reducing Risks Assessed Today: No  Diabetes Risks: Age over 45 years, Ethnicity  CAD Risks: Obesity, Diabetes Mellitus  Has dilated eye exam at least once a year?: Yes  Sees dentist every 6 months?: Yes  Feet checked by healthcare provider in the last year?: Yes    Healthy Coping:  Healthy Coping Assessed Today: Yes  Emotional response to diabetes: Ready to learn, Concern for health and well-being  Informal Support system:: Children  Stage of change: MAINTENANCE (Working to maintain change, with risk of relapse)  Support resources: None  Patient Activation Measure Survey Score:  TRAVIS Score (Last Two) 11/9/2012 4/14/2014   TRAVIS Raw Score 44 43   Activation Score 70.8 68.5   TRAVIS Level 4 4         Care Plan and Education Provided:  Care Plan: Diabetes   Updates made by Taina Cam RD since 8/29/2022 12:00 AM      Problem: HbA1C Not In Goal       Goal: Establish Regular Follow-Ups with PCP       Task: Discuss with PCP the recommended timing for patient's next follow up visit(s)    Responsible User: Taina Cam RD      Task: Discuss schedule for PCP visits with patient    Responsible User: Taina Cam RD      Goal: Get HbA1C Level in Goal       Task: Educate patient on diabetes education self-management topics    Responsible User: Taina Cam RD      Task: Educate patient on benefits of regular glucose monitoring    Responsible User: Taina Cam RD      Task: Refer patient to appropriate extended care team member, as needed (Medication Therapy Management, Behavioral  Health, Physical Therapy, etc.)    Responsible User: Taina Cam RD      Task: Discuss diabetes treatment plan with patient    Responsible User: Taina Cam RD      Problem: Diabetes Self-Management Education Needed to Optimize Self-Care Behaviors       Goal: Understand diabetes pathophysiology and disease progression       Task: Provide education on diabetes pathophysiology and disease progression specfic to patient's diabetes type    Responsible User: Taina Cam RD      Goal: Healthy Eating - follow a healthy eating pattern for diabetes       Task: Provide education on portion control and consistency in amount, composition and timing of food intake    Responsible User: Taina Cam RD      Task: Provide education on managing carbohydrate intake (carbohydrate counting, plate planning method, etc.)    Responsible User: Taina Cam RD      Task: Provide education on weight management    Responsible User: Taina Cam RD      Task: Provide education on heart healthy eating    Responsible User: Taina Cam RD      Task: Provide education on eating out    Responsible User: Taina Cam RD      Task: Develop individualized healthy eating plan with patient    Responsible User: Taina Cam RD      Goal: Being Active - get regular physical activity, working up to at least 150 minutes per week       Task: Provide education on relationship of activity to glucose and precautions to take if at risk for low glucose    Responsible User: Taina Cam RD      Task: Discuss barriers to physical activity with patient    Responsible User: Taina Cam RD      Task: Develop physical activity plan with patient    Responsible User: Taina Cam RD      Task: Explore community resources including walking groups, assistance programs, and home videos    Responsible User: Taina Cam RD      Goal: Monitoring - monitor glucose and ketones as directed       Task: Provide education on blood glucose monitoring  (purpose, proper technique, frequency, glucose targets, interpreting results, when to use glucose control solution, sharps disposal)    Responsible User: Taina Cam RD      Task: Provide education on continuous glucose monitoring (sensor placement, use of leora or /reader, understanding glucose trends, alerts and alarms, differences between sensor glucose and blood glucose) Completed 8/29/2022   Responsible User: Taina Cam RD      Task: Provide education on ketone monitoring (when to monitor, frequency, etc.)    Responsible User: Taina Cam RD      Goal: Taking Medication - patient is consistently taking medications as directed       Task: Provide education on action of prescribed medication, including when to take and possible side effects Completed 8/29/2022   Responsible User: Taina Cam RD      Task: Provide education on insulin and injectable diabetes medications, including administration, storage, site selection and rotation for injection sites    Responsible User: Taina Cam RD      Task: Discuss barriers to medication adherence with patient and provide management technique ideas as appropriate    Responsible User: Taina Cam RD      Task: Provide education on frequency and refill details of medications    Responsible User: Taina Cam RD      Goal: Problem Solving - know how to prevent and manage short-term diabetes complications       Task: Provide education on high blood glucose - causes, signs/symptoms, prevention and treatment    Responsible User: Taina Cam RD      Task: Provide education on low blood glucose - causes, signs/symptoms, prevention, treatment, carrying a carbohydrate source at all times, and medical identification    Responsible User: Taina Cam RD      Task: Provide education on safe travel with diabetes    Responsible User: Taina Cam RD      Task: Provide education on how to care for diabetes on sick days    Responsible User: Taina Cam  CHEVY      Task: Provide education on when to call a health care provider    Responsible User: Taina Cam RD      Goal: Reducing Risks - know how to prevent and treat long-term diabetes complications       Task: Provide education on major complications of diabetes, prevention, early diagnostic measures and treatment of complications    Responsible User: Taina Cam RD      Task: Provide education on recommended care for dental, eye and foot health    Responsible User: Taina Cam RD      Task: Provide education on Hemoglobin A1c - goals and relationship to blood glucose levels Completed 8/29/2022   Responsible User: Taina Cam RD      Task: Provide education on recommendations for heart health - lipid levels and goals, blood pressure and goals, and aspirin therapy, if indicated    Responsible User: Taina Cam RD      Task: Provide education on tobacco cessation    Responsible User: Taina Cam RD      Goal: Healthy Coping - use available resources to cope with the challenges of managing diabetes       Task: Discuss recognizing feelings about having diabetes    Responsible User: Taina Cam RD      Task: Provide education on the benefits of making appropriate lifestyle changes    Responsible User: Taina Cam RD      Task: Provide education on benefits of utilizing support systems    Responsible User: Taina Cam RD      Task: Discuss methods for coping with stress    Responsible User: Taina Cam RD      Task: Provide education on when to seek professional counseling    Responsible User: Taina Cam RD Elise Graham, RD, LD, [facility]     Time Spent: 15 minutes  Encounter Type: Individual    Any diabetes medication dose changes were made via the CDE Protocol per the patient's referring provider. A copy of this encounter was shared with the provider.

## 2022-09-24 DIAGNOSIS — E78.00 HYPERCHOLESTEROLEMIA: ICD-10-CM

## 2022-09-26 RX ORDER — ATORVASTATIN CALCIUM 20 MG/1
TABLET, FILM COATED ORAL
Qty: 90 TABLET | Refills: 1 | Status: SHIPPED | OUTPATIENT
Start: 2022-09-26 | End: 2022-11-09

## 2022-09-26 NOTE — TELEPHONE ENCOUNTER
Prescription approved per [facility] Refill Protocol.    Calli Christopher RN  [facility] Triage Team

## 2022-11-09 DIAGNOSIS — E11.9 TYPE 2 DIABETES MELLITUS WITHOUT COMPLICATION, WITHOUT LONG-TERM CURRENT USE OF INSULIN (H): ICD-10-CM

## 2022-11-09 DIAGNOSIS — E78.00 HYPERCHOLESTEROLEMIA: ICD-10-CM

## 2022-11-11 NOTE — TELEPHONE ENCOUNTER
Routing refill request to provider for review/approval because:  Labs out of range:  A1c  Labs not current:  A1c  Patient needs to be seen because it has been more than 6 months since last office visit.    Cierra GRYE RN  EP Triage

## 2022-11-15 RX ORDER — METFORMIN HCL 500 MG
2000 TABLET, EXTENDED RELEASE 24 HR ORAL
Qty: 120 TABLET | Refills: 0 | Status: SHIPPED | OUTPATIENT
Start: 2022-11-15 | End: 2022-11-30

## 2022-11-15 RX ORDER — ATORVASTATIN CALCIUM 20 MG/1
20 TABLET, FILM COATED ORAL DAILY
Qty: 90 TABLET | Refills: 0 | Status: SHIPPED | OUTPATIENT
Start: 2022-11-15 | End: 2023-05-04

## 2022-11-15 RX ORDER — DULAGLUTIDE 1.5 MG/.5ML
1.5 INJECTION, SOLUTION SUBCUTANEOUS
Qty: 2 ML | Refills: 0 | Status: SHIPPED | OUTPATIENT
Start: 2022-11-15 | End: 2023-05-11

## 2022-11-17 ENCOUNTER — TELEPHONE (OUTPATIENT)
Dept: FAMILY MEDICINE | Facility: CLINIC | Age: 57
End: 2022-11-17

## 2022-11-17 NOTE — TELEPHONE ENCOUNTER
Forms/Letter Request    Type of form/letter: [employer]    Have you been seen for this request: N/A    Do we have the form/letter: Yes:     When is form/letter needed by: when able     How would you like the form/letter returned: Fax           Put form in red folder, placed on AR desk for review.        Anel Verma    [facility]

## 2022-11-18 NOTE — TELEPHONE ENCOUNTER
Picked up completed form, faxed to [phone], and sent to scanning.    Anel SERVIN    [facility]

## 2022-11-20 ENCOUNTER — HEALTH MAINTENANCE LETTER (OUTPATIENT)
Age: 57
End: 2022-11-20

## 2022-11-22 ENCOUNTER — LAB (OUTPATIENT)
Dept: LAB | Facility: CLINIC | Age: 57
End: 2022-11-22
Payer: COMMERCIAL

## 2022-11-22 ENCOUNTER — ALLIED HEALTH/NURSE VISIT (OUTPATIENT)
Dept: FAMILY MEDICINE | Facility: CLINIC | Age: 57
End: 2022-11-22
Payer: COMMERCIAL

## 2022-11-22 DIAGNOSIS — Z23 NEED FOR PROPHYLACTIC VACCINATION AND INOCULATION AGAINST INFLUENZA: Primary | ICD-10-CM

## 2022-11-22 DIAGNOSIS — E11.9 TYPE 2 DIABETES MELLITUS WITHOUT COMPLICATION, WITHOUT LONG-TERM CURRENT USE OF INSULIN (H): ICD-10-CM

## 2022-11-22 LAB — HBA1C MFR BLD: 6.7 % (ref 0–5.6)

## 2022-11-22 PROCEDURE — 99207 PR NO CHARGE NURSE ONLY: CPT

## 2022-11-22 PROCEDURE — 90682 RIV4 VACC RECOMBINANT DNA IM: CPT

## 2022-11-22 PROCEDURE — 83036 HEMOGLOBIN GLYCOSYLATED A1C: CPT

## 2022-11-22 PROCEDURE — 90471 IMMUNIZATION ADMIN: CPT

## 2022-11-22 PROCEDURE — 36415 COLL VENOUS BLD VENIPUNCTURE: CPT

## 2022-11-25 ENCOUNTER — TELEPHONE (OUTPATIENT)
Dept: FAMILY MEDICINE | Facility: CLINIC | Age: 57
End: 2022-11-25

## 2022-11-25 DIAGNOSIS — E11.9 TYPE 2 DIABETES MELLITUS WITHOUT COMPLICATION, WITHOUT LONG-TERM CURRENT USE OF INSULIN (H): ICD-10-CM

## 2022-11-27 ASSESSMENT — ASTHMA QUESTIONNAIRES

## 2022-11-28 ENCOUNTER — VIRTUAL VISIT (OUTPATIENT)
Dept: FAMILY MEDICINE | Facility: CLINIC | Age: 57
End: 2022-11-28
Payer: COMMERCIAL

## 2022-11-28 DIAGNOSIS — E11.9 TYPE 2 DIABETES MELLITUS WITHOUT COMPLICATION, WITHOUT LONG-TERM CURRENT USE OF INSULIN (H): ICD-10-CM

## 2022-11-28 DIAGNOSIS — Z12.11 SCREEN FOR COLON CANCER: ICD-10-CM

## 2022-11-28 PROCEDURE — 99213 OFFICE O/P EST LOW 20 MIN: CPT | Mod: TEL | Performed by: PHYSICIAN ASSISTANT

## 2022-11-28 NOTE — TELEPHONE ENCOUNTER
S/w pt who is requesting 90 day supply of sensors.     Routing refill request to provider for review/approval because:  Drug not on the [facility] refill protocol     Cierra GREY RN  EP Triage

## 2022-11-28 NOTE — PROGRESS NOTES
"Mary is a 57 year old who is being evaluated via a billable telephone visit.      What phone number would you like to be contacted at? [phone]  How would you like to obtain your AVS? Suleimanhart    Assessment & Plan     Type 2 diabetes mellitus without complication, without long-term current use of insulin (H)  Well controlled.  She will continue with the same regimen. Follow up in 6 months  Melatonin for sleep PRN    Screen for colon cancer  - Fecal colorectal cancer screen (FIT); Future      BMI:   Estimated body mass index is 33.62 kg/m  as calculated from the following:    Height as of 4/18/22: 1.6 m (5' 3\").    Weight as of 6/10/22: 86.1 kg (189 lb 12.8 oz).       Return in about 6 months (around 5/28/2023) for Routine Visit.    Zafar Jerome PA-C  [facility]    Jasmyne Hernandez is a 57 year old presenting for the following health issues:  No chief complaint on file.      History of Present Illness       Diabetes:   She presents for follow up of diabetes.  She is checking home blood glucose with a continuous glucose monitor.  She checks blood glucose before and after meals and at bedtime.  Blood glucose is sometimes over 200 and never under 70. When her blood glucose is low, the patient is asymptomatic for confusion, blurred vision, lethargy and reports not feeling dizzy, shaky, or weak.  She has no concerns regarding her diabetes at this time.  She is not experiencing numbness or burning in feet, excessive thirst, blurry vision, weight changes or redness, sores or blisters on feet. The patient has had a diabetic eye exam in the last 12 months. Eye exam performed on 6/2022. [facility] of [facility]        She eats 4 or more servings of fruits and vegetables daily.She consumes 0 sweetened beverage(s) daily.She exercises with enough effort to increase her heart rate 30 to 60 minutes per day.  She exercises with enough effort to increase her heart rate 3 or less " days per week.   She is taking medications regularly.     Mary presents via telephone visit to discuss her blood sugar.  She is currently using Trulicity and metformin.  Recent A1C 6.7%.  Fasting sugars are around 100 and post prandial 125.  Few elevated sugars in the past 3 months.   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  She does not experience anxiety otherwise  No new headaches       Review of Systems   Constitutional, HEENT, cardiovascular, pulmonary, GI, , musculoskeletal, neuro, skin, endocrine and psych systems are negative, except as otherwise noted.      Objective           Vitals:  No vitals were obtained today due to virtual visit.    Physical Exam   healthy, alert and no distress  PSYCH: Alert and oriented times 3; coherent speech, normal   rate and volume, able to articulate logical thoughts, able   to abstract reason, no tangential thoughts, no hallucinations   or delusions  Her affect is normal  RESP: No cough, no audible wheezing, able to talk in full sentences  Remainder of exam unable to be completed due to telephone visits          Phone call duration: 15   minutes

## 2022-11-29 ENCOUNTER — MYC MEDICAL ADVICE (OUTPATIENT)
Dept: FAMILY MEDICINE | Facility: CLINIC | Age: 57
End: 2022-11-29

## 2022-11-29 DIAGNOSIS — E11.9 TYPE 2 DIABETES MELLITUS WITHOUT COMPLICATION, WITHOUT LONG-TERM CURRENT USE OF INSULIN (H): ICD-10-CM

## 2022-11-30 RX ORDER — METFORMIN HCL 500 MG
2000 TABLET, EXTENDED RELEASE 24 HR ORAL
Qty: 16 TABLET | Refills: 0 | Status: SHIPPED | OUTPATIENT
Start: 2022-11-30 | End: 2023-06-20

## 2022-11-30 NOTE — TELEPHONE ENCOUNTER
Prescription approved per [facility] Refill Protocol.  Galo Greene RN  [facility] Triage Nurse

## 2022-12-20 ENCOUNTER — IMMUNIZATION (OUTPATIENT)
Dept: FAMILY MEDICINE | Facility: CLINIC | Age: 57
End: 2022-12-20
Payer: COMMERCIAL

## 2022-12-20 DIAGNOSIS — Z23 HIGH PRIORITY FOR 2019-NCOV VACCINE: Primary | ICD-10-CM

## 2022-12-20 PROCEDURE — 91312 COVID-19 VACCINE BIVALENT BOOSTER 12+ (PFIZER): CPT

## 2022-12-20 PROCEDURE — 0124A COVID-19 VACCINE BIVALENT BOOSTER 12+ (PFIZER): CPT

## 2023-04-01 ENCOUNTER — TRANSFERRED RECORDS (OUTPATIENT)
Dept: MULTI SPECIALTY CLINIC | Facility: CLINIC | Age: 58
End: 2023-04-01

## 2023-04-01 LAB — RETINOPATHY: NORMAL

## 2023-05-04 DIAGNOSIS — E78.00 HYPERCHOLESTEROLEMIA: ICD-10-CM

## 2023-05-08 RX ORDER — ATORVASTATIN CALCIUM 20 MG/1
TABLET, FILM COATED ORAL
Qty: 90 TABLET | Refills: 0 | Status: SHIPPED | OUTPATIENT
Start: 2023-05-08 | End: 2023-07-06

## 2023-05-11 DIAGNOSIS — E11.9 TYPE 2 DIABETES MELLITUS WITHOUT COMPLICATION, WITHOUT LONG-TERM CURRENT USE OF INSULIN (H): ICD-10-CM

## 2023-05-11 RX ORDER — DULAGLUTIDE 1.5 MG/.5ML
INJECTION, SOLUTION SUBCUTANEOUS
Qty: 3 ML | Refills: 3 | Status: SHIPPED | OUTPATIENT
Start: 2023-05-11 | End: 2023-07-06

## 2023-06-02 ENCOUNTER — HEALTH MAINTENANCE LETTER (OUTPATIENT)
Age: 58
End: 2023-06-02

## 2023-06-18 DIAGNOSIS — E11.9 TYPE 2 DIABETES MELLITUS WITHOUT COMPLICATION, WITHOUT LONG-TERM CURRENT USE OF INSULIN (H): ICD-10-CM

## 2023-06-20 RX ORDER — METFORMIN HCL 500 MG
TABLET, EXTENDED RELEASE 24 HR ORAL
Qty: 360 TABLET | Refills: 0 | Status: SHIPPED | OUTPATIENT
Start: 2023-06-20 | End: 2023-07-06

## 2023-06-22 ENCOUNTER — VIRTUAL VISIT (OUTPATIENT)
Dept: FAMILY MEDICINE | Facility: CLINIC | Age: 58
End: 2023-06-22
Payer: COMMERCIAL

## 2023-06-22 DIAGNOSIS — H92.01 DISCOMFORT OF RIGHT EAR: Primary | ICD-10-CM

## 2023-06-22 DIAGNOSIS — R09.81 NASAL CONGESTION: ICD-10-CM

## 2023-06-22 DIAGNOSIS — R07.0 THROAT PAIN: ICD-10-CM

## 2023-06-22 PROCEDURE — 99213 OFFICE O/P EST LOW 20 MIN: CPT | Mod: VID | Performed by: PHYSICIAN ASSISTANT

## 2023-06-22 ASSESSMENT — ASTHMA QUESTIONNAIRES
QUESTION_4 LAST FOUR WEEKS HOW OFTEN HAVE YOU USED YOUR RESCUE INHALER OR NEBULIZER MEDICATION (SUCH AS ALBUTEROL): NOT AT ALL
QUESTION_1 LAST FOUR WEEKS HOW MUCH OF THE TIME DID YOUR ASTHMA KEEP YOU FROM GETTING AS MUCH DONE AT WORK, SCHOOL OR AT HOME: NONE OF THE TIME
QUESTION_5 LAST FOUR WEEKS HOW WOULD YOU RATE YOUR ASTHMA CONTROL: COMPLETELY CONTROLLED
ACT_TOTALSCORE: 25
ACT_TOTALSCORE: 25
QUESTION_3 LAST FOUR WEEKS HOW OFTEN DID YOUR ASTHMA SYMPTOMS (WHEEZING, COUGHING, SHORTNESS OF BREATH, CHEST TIGHTNESS OR PAIN) WAKE YOU UP AT NIGHT OR EARLIER THAN USUAL IN THE MORNING: NOT AT ALL
QUESTION_2 LAST FOUR WEEKS HOW OFTEN HAVE YOU HAD SHORTNESS OF BREATH: NOT AT ALL

## 2023-06-22 ASSESSMENT — ENCOUNTER SYMPTOMS: SORE THROAT: 1

## 2023-06-22 NOTE — PATIENT INSTRUCTIONS
Nasonex at bedtime    Saline nasal rinses in the morning     Continue OTC zyrtec    Try sudafed for 2-3 days     Follow up in person if not improving

## 2023-06-22 NOTE — PROGRESS NOTES
Mray is a 57 year old who is being evaluated via a billable video visit.      How would you like to obtain your AVS? MyChart  If the video visit is dropped, the invitation should be resent by: Send to e-mail at: [email or web address]  Will anyone else be joining your video visit? No      Assessment & Plan     Discomfort of right ear  With swallowing, seems to radiate from throat     Throat pain  R>L, mostly just when swallowing or laying down     Nasal congestion  Has improved since initial onset.     Above symptoms likely related to recent congestion, possibly allergies. I recommend:  Nasonex at bedtime   Saline rinses in the mornings  Continue zyrtec   Add sudafed for 2-3 days (no history of hypertension)    If not improving within the next week, follow up in person for further evaluation. Sooner if worsening or new symptoms arise.      JUDSON [facility]   Mary is a 57 year old, presenting for the following health issues:  Pharyngitis, Ear Problem (Right ear pain. ), and Sinus Problem        6/22/2023    10:19 AM   Additional Questions   Roomed by [facility] SNEED         6/22/2023    10:19 AM   Patient Reported Additional Medications   Patient reports taking the following new medications Tylenol     Pharyngitis     History of Present Illness       Reason for visit:  Sore throat  Symptom onset:  3-7 days ago  Symptoms include:  Sore throat  Symptom intensity:  Moderate  Symptom progression:  Staying the same  Had these symptoms before:  No  What makes it worse:  Laying down  What makes it better:  Hot drink    She eats 4 or more servings of fruits and vegetables daily.She consumes 0 sweetened beverage(s) daily.She exercises with enough effort to increase her heart rate 20 to 29 minutes per day.  She exercises with enough effort to increase her heart rate 3 or less days per week.   She is taking medications regularly.     Sore throat for a week - started on left side at base of  throat, now on the right side only when swallowing also affecting her right ear  Seems worse when laying down, some coughing when laying   No choking on water or food  No fevers or chills   No shortness of breath   No change in hearing, no drainage out of ear  Ear feels sensitive but not painful unless swallowing  No recent air travel    Congestion, runny nose - yellow mucous which now seems to be a bit better   Had some discharge and crusting around right eye which has now cleared up, no vision changes or eye pain   Grandchildren have been sick but no positives strep she's aware of     Has tried tylenol and throat lozenges without improvement   Takes OTC zyrtec  Not using any nasal sprays     Review of Systems   HENT: Positive for sore throat.       Constitutional, HEENT, cardiovascular, pulmonary, gi and gu systems are negative, except as otherwise noted.      Objective    Vitals - Patient Reported  Pain Score: Moderate Pain (5)  Pain Loc: Throat (back of throat)    Physical Exam   GENERAL: Healthy, alert and no distress  EYES: Eyes grossly normal to inspection.  No obvious scleral/conjunctival abnormalities. Poor video quality   ENT: limited view of pharynx - uvula midline, difficult to see other details but no obvious swelling noted   RESP: No audible wheeze, cough, or visible cyanosis.  No visible retractions or increased work of breathing.    SKIN: Visible skin clear. Poor video quality   NEURO: Cranial nerves grossly intact.  Mentation and speech appropriate for age.  PSYCH: Mentation appears normal, affect normal/bright, judgement and insight intact, normal speech and appearance well-groomed.      Video-Visit Details    Type of service:  Video Visit   Video visit start time: 10:37 AM  Video visit end time: 10:52 AM    Originating Location (pt. Location): Home  Distant Location (provider location):  On-site  Platform used for Video Visit: [employer]OTEIN [employer]GE initially, then [facility] due to connection issues on PROTE[employer]OUNGE

## 2023-07-05 ASSESSMENT — ASTHMA QUESTIONNAIRES: ACT_TOTALSCORE: 25

## 2023-07-06 ENCOUNTER — VIRTUAL VISIT (OUTPATIENT)
Dept: FAMILY MEDICINE | Facility: CLINIC | Age: 58
End: 2023-07-06
Payer: COMMERCIAL

## 2023-07-06 DIAGNOSIS — Z12.11 SPECIAL SCREENING FOR MALIGNANT NEOPLASMS, COLON: Primary | ICD-10-CM

## 2023-07-06 DIAGNOSIS — E78.5 HYPERLIPIDEMIA LDL GOAL <100: ICD-10-CM

## 2023-07-06 DIAGNOSIS — D50.9 IRON DEFICIENCY ANEMIA, UNSPECIFIED IRON DEFICIENCY ANEMIA TYPE: ICD-10-CM

## 2023-07-06 DIAGNOSIS — E11.9 TYPE 2 DIABETES MELLITUS WITHOUT COMPLICATION, WITHOUT LONG-TERM CURRENT USE OF INSULIN (H): ICD-10-CM

## 2023-07-06 DIAGNOSIS — Z12.31 ENCOUNTER FOR SCREENING MAMMOGRAM FOR BREAST CANCER: ICD-10-CM

## 2023-07-06 DIAGNOSIS — R09.81 NASAL CONGESTION: ICD-10-CM

## 2023-07-06 DIAGNOSIS — J45.20 MILD INTERMITTENT ASTHMA WITHOUT COMPLICATION: Chronic | ICD-10-CM

## 2023-07-06 PROCEDURE — 99214 OFFICE O/P EST MOD 30 MIN: CPT | Mod: VID | Performed by: PHYSICIAN ASSISTANT

## 2023-07-06 RX ORDER — METFORMIN HCL 500 MG
TABLET, EXTENDED RELEASE 24 HR ORAL
Qty: 360 TABLET | Refills: 1 | Status: SHIPPED | OUTPATIENT
Start: 2023-07-06 | End: 2024-02-19

## 2023-07-06 RX ORDER — DULAGLUTIDE 1.5 MG/.5ML
1.5 INJECTION, SOLUTION SUBCUTANEOUS
Qty: 9 ML | Refills: 1 | Status: SHIPPED | OUTPATIENT
Start: 2023-07-06 | End: 2023-12-29

## 2023-07-06 RX ORDER — ATORVASTATIN CALCIUM 20 MG/1
20 TABLET, FILM COATED ORAL DAILY
Qty: 90 TABLET | Refills: 3 | Status: SHIPPED | OUTPATIENT
Start: 2023-07-06 | End: 2024-07-22

## 2023-07-06 NOTE — PROGRESS NOTES
Mary is a 57 year old who is being evaluated via a billable video visit.      How would you like to obtain your AVS? MyChart  If the video visit is dropped, the invitation should be resent by: Text to cell phone: [phone]  Will anyone else be joining your video visit? No        Assessment & Plan     Type 2 diabetes mellitus without complication, without long-term current use of insulin (H)  Continue trulicity and metformin.  Labs ordered.  She will schedule lab only in the next 2-4 weeks and I will reach out when I have results  - Lipid panel reflex to direct LDL Non-fasting; Future  - Albumin Random Urine Quantitative with Creat Ratio; Future  - HEMOGLOBIN A1C; Future  - Comprehensive metabolic panel (BMP + Alb, Alk Phos, ALT, AST, Total. Bili, TP); Future  - metFORMIN (GLUCOPHAGE XR) 500 MG 24 hr tablet; Take 2000 mg daily  - dulaglutide (TRULICITY) 1.5 MG/0.5ML pen; Inject 1.5 mg Subcutaneous every 7 days  - Continuous Blood Gluc  (FREESTYLE YVETTE 2 READER) EMMETT; Use to read blood sugars as per 's instructions.    Hyperlipidemia LDL goal <100  Stable    Special screening for malignant neoplasms, colon  - Fecal colorectal cancer screen (FIT); Future    Mild intermittent asthma without complication  Stable    Encounter for screening mammogram for breast cancer  - *MA Screening Digital Bilateral; Future    Iron deficiency anemia, unspecified iron deficiency anemia type  - CBC with platelets; Future      7. Nasal congestion  Ok to try sudafed over the next few days.  If persisting, we may need to consider an antibiotic        Zafar Jerome PA-C  [facility]   Mary is a 57 year old, presenting for the following health issues:  Recheck Medication and Diabetes        7/6/2023     4:30 PM   Additional Questions   Roomed by Clara     History of Present Illness       Diabetes:   She presents for follow up of diabetes.  She is checking home blood  glucose with a continuous glucose monitor.  She checks blood glucose before and after meals and at bedtime.  Blood glucose is sometimes over 200 and sometimes under 70. When her blood glucose is low, the patient is asymptomatic for confusion, blurred vision, lethargy and reports not feeling dizzy, shaky, or weak.  She has no concerns regarding her diabetes at this time.  She is having weight loss. The patient has had a diabetic eye exam in the last 12 months. Eye exam performed on 5/30/2022. Location of last eye exam [facility].        She eats 4 or more servings of fruits and vegetables daily.She consumes 0 sweetened beverage(s) daily.She exercises with enough effort to increase her heart rate 20 to 29 minutes per day.  She exercises with enough effort to increase her heart rate 3 or less days per week.   She is taking medications regularly.      Fasting sugars have been around 100-120, no SE with medication.  Has had a few low readings of 70, but when rechecked these have been around 80-90.    Overall tolerating medication well  Was recently treated for allergies and sinus congestion. This seems to be lingering pressure in her face that is worse with swallowing.  Decongestant was helpful but she stopped this after 2 days      Review of Systems   Constitutional, HEENT, cardiovascular, pulmonary, GI, , musculoskeletal, neuro, skin, endocrine and psych systems are negative, except as otherwise noted.      Objective           Vitals:  No vitals were obtained today due to virtual visit.    Physical Exam   GENERAL: Healthy, alert and no distress  EYES: Eyes grossly normal to inspection.  No discharge or erythema, or obvious scleral/conjunctival abnormalities.  RESP: No audible wheeze, cough, or visible cyanosis.  No visible retractions or increased work of breathing.    SKIN: Visible skin clear. No significant rash, abnormal pigmentation or lesions.  NEURO: Cranial nerves grossly intact.  Mentation and speech  appropriate for age.  PSYCH: Mentation appears normal, affect normal/bright, judgement and insight intact, normal speech and appearance well-groomed.            Video-Visit Details    Type of service:  Video Visit     Originating Location (pt. Location): Home  Distant Location (provider location):  On-site  Platform used for Video Visit: Pavan

## 2023-07-14 ENCOUNTER — LAB (OUTPATIENT)
Dept: LAB | Facility: CLINIC | Age: 58
End: 2023-07-14
Attending: PHYSICIAN ASSISTANT
Payer: COMMERCIAL

## 2023-07-14 DIAGNOSIS — E11.9 TYPE 2 DIABETES MELLITUS WITHOUT COMPLICATION, WITHOUT LONG-TERM CURRENT USE OF INSULIN (H): ICD-10-CM

## 2023-07-14 DIAGNOSIS — Z11.59 NEED FOR HEPATITIS C SCREENING TEST: ICD-10-CM

## 2023-07-14 DIAGNOSIS — D50.9 IRON DEFICIENCY ANEMIA, UNSPECIFIED IRON DEFICIENCY ANEMIA TYPE: ICD-10-CM

## 2023-07-14 DIAGNOSIS — Z11.4 SCREENING FOR HIV (HUMAN IMMUNODEFICIENCY VIRUS): ICD-10-CM

## 2023-07-14 DIAGNOSIS — Z12.11 SPECIAL SCREENING FOR MALIGNANT NEOPLASMS, COLON: ICD-10-CM

## 2023-07-14 LAB
ALBUMIN SERPL BCG-MCNC: 4.8 G/DL (ref 3.5–5.2)
ALP SERPL-CCNC: 77 U/L (ref 35–104)
ALT SERPL W P-5'-P-CCNC: 30 U/L (ref 0–50)
ANION GAP SERPL CALCULATED.3IONS-SCNC: 14 MMOL/L (ref 7–15)
AST SERPL W P-5'-P-CCNC: 20 U/L (ref 0–45)
BILIRUB SERPL-MCNC: 0.4 MG/DL
BUN SERPL-MCNC: 15.8 MG/DL (ref 6–20)
CALCIUM SERPL-MCNC: 10.3 MG/DL (ref 8.6–10)
CHLORIDE SERPL-SCNC: 101 MMOL/L (ref 98–107)
CHOLEST SERPL-MCNC: 150 MG/DL
CREAT SERPL-MCNC: 0.7 MG/DL (ref 0.51–0.95)
CREAT UR-MCNC: 157 MG/DL
DEPRECATED HCO3 PLAS-SCNC: 26 MMOL/L (ref 22–29)
ERYTHROCYTE [DISTWIDTH] IN BLOOD BY AUTOMATED COUNT: 16.9 % (ref 10–15)
GFR SERPL CREATININE-BSD FRML MDRD: >90 ML/MIN/1.73M2
GLUCOSE SERPL-MCNC: 110 MG/DL (ref 70–99)
HBA1C MFR BLD: 6.3 % (ref 0–5.6)
HCT VFR BLD AUTO: 38.6 % (ref 35–47)
HDLC SERPL-MCNC: 49 MG/DL
HGB BLD-MCNC: 12.1 G/DL (ref 11.7–15.7)
LDLC SERPL CALC-MCNC: 72 MG/DL
MCH RBC QN AUTO: 24.4 PG (ref 26.5–33)
MCHC RBC AUTO-ENTMCNC: 31.3 G/DL (ref 31.5–36.5)
MCV RBC AUTO: 78 FL (ref 78–100)
MICROALBUMIN UR-MCNC: 33.9 MG/L
MICROALBUMIN/CREAT UR: 21.59 MG/G CR (ref 0–25)
NONHDLC SERPL-MCNC: 101 MG/DL
PLATELET # BLD AUTO: 367 10E3/UL (ref 150–450)
POTASSIUM SERPL-SCNC: 4.2 MMOL/L (ref 3.4–5.3)
PROT SERPL-MCNC: 7.8 G/DL (ref 6.4–8.3)
RBC # BLD AUTO: 4.95 10E6/UL (ref 3.8–5.2)
SODIUM SERPL-SCNC: 141 MMOL/L (ref 136–145)
TRIGL SERPL-MCNC: 146 MG/DL
WBC # BLD AUTO: 6.9 10E3/UL (ref 4–11)

## 2023-07-14 PROCEDURE — 86803 HEPATITIS C AB TEST: CPT

## 2023-07-14 PROCEDURE — 82570 ASSAY OF URINE CREATININE: CPT

## 2023-07-14 PROCEDURE — 80061 LIPID PANEL: CPT

## 2023-07-14 PROCEDURE — 82043 UR ALBUMIN QUANTITATIVE: CPT

## 2023-07-14 PROCEDURE — 80053 COMPREHEN METABOLIC PANEL: CPT

## 2023-07-14 PROCEDURE — 36415 COLL VENOUS BLD VENIPUNCTURE: CPT

## 2023-07-14 PROCEDURE — 85027 COMPLETE CBC AUTOMATED: CPT

## 2023-07-14 PROCEDURE — 87389 HIV-1 AG W/HIV-1&-2 AB AG IA: CPT

## 2023-07-14 PROCEDURE — 83036 HEMOGLOBIN GLYCOSYLATED A1C: CPT

## 2023-07-15 LAB
HCV AB SERPL QL IA: NONREACTIVE
HIV 1+2 AB+HIV1 P24 AG SERPL QL IA: NONREACTIVE

## 2023-07-15 PROCEDURE — 82274 ASSAY TEST FOR BLOOD FECAL: CPT

## 2023-07-17 ENCOUNTER — MYC MEDICAL ADVICE (OUTPATIENT)
Dept: FAMILY MEDICINE | Facility: CLINIC | Age: 58
End: 2023-07-17
Payer: COMMERCIAL

## 2023-07-17 DIAGNOSIS — E11.9 TYPE 2 DIABETES MELLITUS WITHOUT COMPLICATION, WITHOUT LONG-TERM CURRENT USE OF INSULIN (H): ICD-10-CM

## 2023-07-18 LAB — HEMOCCULT STL QL IA: NEGATIVE

## 2023-07-19 NOTE — RESULT ENCOUNTER NOTE
Mary-  Here are your recent results.     Your labs appear stable and your A1C is improved, now 6.3%!  Great work.  You should continue your current regimen without changes. We can follow up again in 6 months    Zafar Jerome PA-C

## 2023-07-28 ENCOUNTER — HOSPITAL ENCOUNTER (OUTPATIENT)
Dept: MAMMOGRAPHY | Facility: CLINIC | Age: 58
Discharge: HOME | End: 2023-07-28
Attending: PHYSICIAN ASSISTANT | Admitting: PHYSICIAN ASSISTANT
Payer: COMMERCIAL

## 2023-07-28 DIAGNOSIS — Z12.31 ENCOUNTER FOR SCREENING MAMMOGRAM FOR BREAST CANCER: ICD-10-CM

## 2023-07-28 PROCEDURE — 77067 SCR MAMMO BI INCL CAD: CPT

## 2023-12-29 DIAGNOSIS — E11.9 TYPE 2 DIABETES MELLITUS WITHOUT COMPLICATION, WITHOUT LONG-TERM CURRENT USE OF INSULIN (H): ICD-10-CM

## 2023-12-29 RX ORDER — DULAGLUTIDE 1.5 MG/.5ML
1.5 INJECTION, SOLUTION SUBCUTANEOUS
Qty: 6 ML | Refills: 1 | Status: SHIPPED | OUTPATIENT
Start: 2023-12-29 | End: 2024-01-07

## 2023-12-29 NOTE — TELEPHONE ENCOUNTER
Prescription approved per [facility] Refill Protocol.  Stefani Sabillon RN  [facility]

## 2024-01-07 ENCOUNTER — MYC REFILL (OUTPATIENT)
Dept: FAMILY MEDICINE | Facility: CLINIC | Age: 59
End: 2024-01-07
Payer: COMMERCIAL

## 2024-01-07 DIAGNOSIS — E11.9 TYPE 2 DIABETES MELLITUS WITHOUT COMPLICATION, WITHOUT LONG-TERM CURRENT USE OF INSULIN (H): ICD-10-CM

## 2024-01-09 RX ORDER — DULAGLUTIDE 1.5 MG/.5ML
1.5 INJECTION, SOLUTION SUBCUTANEOUS
Qty: 6 ML | Refills: 1 | Status: SHIPPED | OUTPATIENT
Start: 2024-01-09

## 2024-01-09 NOTE — TELEPHONE ENCOUNTER
Spoke with patient and informed her of the message from Zafar Jerome PA-C. Patient scheduled for office visit on 02/19/24 at 7:10am.  Kristina Jewell CMA      1/9/2024     4:41 PM

## 2024-02-03 ENCOUNTER — HEALTH MAINTENANCE LETTER (OUTPATIENT)
Age: 59
End: 2024-02-03

## 2024-02-15 ASSESSMENT — ASTHMA QUESTIONNAIRES
ACT_TOTALSCORE: 24
ACT_TOTALSCORE: 24
QUESTION_1 LAST FOUR WEEKS HOW MUCH OF THE TIME DID YOUR ASTHMA KEEP YOU FROM GETTING AS MUCH DONE AT WORK, SCHOOL OR AT HOME: NONE OF THE TIME
QUESTION_2 LAST FOUR WEEKS HOW OFTEN HAVE YOU HAD SHORTNESS OF BREATH: NOT AT ALL
QUESTION_4 LAST FOUR WEEKS HOW OFTEN HAVE YOU USED YOUR RESCUE INHALER OR NEBULIZER MEDICATION (SUCH AS ALBUTEROL): NOT AT ALL
QUESTION_3 LAST FOUR WEEKS HOW OFTEN DID YOUR ASTHMA SYMPTOMS (WHEEZING, COUGHING, SHORTNESS OF BREATH, CHEST TIGHTNESS OR PAIN) WAKE YOU UP AT NIGHT OR EARLIER THAN USUAL IN THE MORNING: NOT AT ALL
QUESTION_5 LAST FOUR WEEKS HOW WOULD YOU RATE YOUR ASTHMA CONTROL: WELL CONTROLLED

## 2024-02-19 ENCOUNTER — OFFICE VISIT (OUTPATIENT)
Dept: FAMILY MEDICINE | Facility: CLINIC | Age: 59
End: 2024-02-19
Payer: COMMERCIAL

## 2024-02-19 VITALS
HEART RATE: 87 BPM | RESPIRATION RATE: 20 BRPM | DIASTOLIC BLOOD PRESSURE: 74 MMHG | WEIGHT: 190 LBS | SYSTOLIC BLOOD PRESSURE: 112 MMHG | OXYGEN SATURATION: 99 % | HEIGHT: 65 IN | BODY MASS INDEX: 31.65 KG/M2 | TEMPERATURE: 97.2 F

## 2024-02-19 DIAGNOSIS — Z12.11 SCREEN FOR COLON CANCER: ICD-10-CM

## 2024-02-19 DIAGNOSIS — E11.9 TYPE 2 DIABETES MELLITUS WITHOUT COMPLICATION, WITHOUT LONG-TERM CURRENT USE OF INSULIN (H): Primary | ICD-10-CM

## 2024-02-19 DIAGNOSIS — J45.20 MILD INTERMITTENT ASTHMA WITHOUT COMPLICATION: Chronic | ICD-10-CM

## 2024-02-19 DIAGNOSIS — E78.5 HYPERLIPIDEMIA LDL GOAL <100: ICD-10-CM

## 2024-02-19 LAB — HBA1C MFR BLD: 6.7 % (ref 0–5.6)

## 2024-02-19 PROCEDURE — 99214 OFFICE O/P EST MOD 30 MIN: CPT | Mod: 25 | Performed by: PHYSICIAN ASSISTANT

## 2024-02-19 PROCEDURE — 91320 SARSCV2 VAC 30MCG TRS-SUC IM: CPT | Performed by: PHYSICIAN ASSISTANT

## 2024-02-19 PROCEDURE — 90686 IIV4 VACC NO PRSV 0.5 ML IM: CPT | Performed by: PHYSICIAN ASSISTANT

## 2024-02-19 PROCEDURE — 99207 PR FOOT EXAM NO CHARGE: CPT | Performed by: PHYSICIAN ASSISTANT

## 2024-02-19 PROCEDURE — 90471 IMMUNIZATION ADMIN: CPT | Performed by: PHYSICIAN ASSISTANT

## 2024-02-19 PROCEDURE — 83036 HEMOGLOBIN GLYCOSYLATED A1C: CPT | Performed by: PHYSICIAN ASSISTANT

## 2024-02-19 PROCEDURE — 90480 ADMN SARSCOV2 VAC 1/ONLY CMP: CPT | Performed by: PHYSICIAN ASSISTANT

## 2024-02-19 PROCEDURE — 36415 COLL VENOUS BLD VENIPUNCTURE: CPT | Performed by: PHYSICIAN ASSISTANT

## 2024-02-19 RX ORDER — METFORMIN HCL 500 MG
TABLET, EXTENDED RELEASE 24 HR ORAL
Qty: 360 TABLET | Refills: 1 | Status: SHIPPED | OUTPATIENT
Start: 2024-02-19 | End: 2024-08-26

## 2024-02-19 ASSESSMENT — PAIN SCALES - GENERAL: PAINLEVEL: NO PAIN (0)

## 2024-02-19 NOTE — PROGRESS NOTES
1. Type 2 diabetes mellitus without complication, without long-term current use of insulin (H)  Stable, fasting sugars seem to be within goal. Plan to continue same regimen.  Follow up for annual exam in 6 months  - FOOT EXAM  - Hemoglobin A1c; Future  - metFORMIN (GLUCOPHAGE XR) 500 MG 24 hr tablet; Take 2000 mg daily  Dispense: 360 tablet; Refill: 1  - Hemoglobin A1c    2. Mild intermittent asthma without complication  Stable, controlled    3. Hyperlipidemia LDL goal <100  Stable, taking statin    4. Screen for colon cancer  - Fecal colorectal cancer screen (FIT); Future  - Fecal colorectal cancer screen (FIT)      Subjective   Mary is a 58 year old, presenting for the following health issues:  Recheck Medication        2/19/2024     7:21 AM   Additional Questions   Roomed by [facility]     Via the Health Maintenance questionnaire, the patient has reported the following services have been completed -Eye Exam, this information has been sent to the abstraction team.    History of Present Illness       Diabetes:   She presents for follow up of diabetes.   She is checking home blood glucose with a continuous glucose monitor.   She checks blood glucose after meals.  Blood glucose is sometimes over 200 and never under 70. She is aware of hypoglycemia symptoms including none.    She has no concerns regarding her diabetes at this time.   She is not experiencing numbness or burning in feet, excessive thirst, blurry vision, weight changes or redness, sores or blisters on feet. The patient has had a diabetic eye exam in the last 12 months. Eye exam performed on April 2023. Location of last eye exam [facility].        She eats 2-3 servings of fruits and vegetables daily.She consumes 1 sweetened beverage(s) daily.She exercises with enough effort to increase her heart rate 30 to 60 minutes per day.  She exercises with enough effort to increase her heart rate 3 or less days per week.   She is taking medications regularly.    "      Medication Followup of all   Taking Medication as prescribed: yes  Side Effects:  None  Medication Helping Symptoms:  yes       DM:  Taking metformin and Trulicity for t2DM, uses CGM.  Fasting sugars are around , post prandial sugars around 135.  She and her family were recently ill with viral illness and so was doing as well with diet and exercise but she has started back on her usual schedule now.       Review of Systems  Constitutional, HEENT, cardiovascular, pulmonary, GI, , musculoskeletal, neuro, skin, endocrine and psych systems are negative, except as otherwise noted.      Objective    /74   Pulse 87   Temp 97.2  F (36.2  C) (Tympanic)   Resp 20   Ht 1.638 m (5' 4.5\")   Wt 86.2 kg (190 lb)   LMP 10/14/2015   SpO2 99%   BMI 32.11 kg/m    Body mass index is 32.11 kg/m .  Physical Exam   GENERAL: alert and no distress  EYES: Eyes grossly normal to inspection, PERRL and conjunctivae and sclerae normal  HENT: ear canals and TM's normal, nose and mouth without ulcers or lesions  NECK: no adenopathy, no asymmetry, masses, or scars  RESP: lungs clear to auscultation - no rales, rhonchi or wheezes  CV: regular rate and rhythm, normal S1 S2, no S3 or S4, no murmur, click or rub, no peripheral edema   PSYCH: mentation appears normal, affect normal/bright  Foot:  normal PT and DP pulses, normal monofilament examination, no trophic changes          Signed Electronically by: Zafar Jerome PA-C    "

## 2024-02-27 NOTE — RESULT ENCOUNTER NOTE
Mary-  Here are your recent results.     Your A1C remains stable at 6.7% which is in your goal.  You can continue the same medication and we can follow up in 6 months  Zafar Jerome Pa-C

## 2024-03-11 PROCEDURE — 82274 ASSAY TEST FOR BLOOD FECAL: CPT | Performed by: PHYSICIAN ASSISTANT

## 2024-03-14 LAB — HEMOCCULT STL QL IA: POSITIVE

## 2024-03-15 ENCOUNTER — MYC MEDICAL ADVICE (OUTPATIENT)
Dept: FAMILY MEDICINE | Facility: CLINIC | Age: 59
End: 2024-03-15
Payer: COMMERCIAL

## 2024-03-15 DIAGNOSIS — R19.5 POSITIVE FIT (FECAL IMMUNOCHEMICAL TEST): Primary | ICD-10-CM

## 2024-03-15 DIAGNOSIS — Z12.11 SPECIAL SCREENING FOR MALIGNANT NEOPLASMS, COLON: ICD-10-CM

## 2024-03-18 NOTE — TELEPHONE ENCOUNTER
Please see [employer] message and advise. Does provider still recommend that patient has a colonoscopy?    Calli ROBLEDO RN  [facility] Triage Team

## 2024-03-18 NOTE — TELEPHONE ENCOUNTER
I would still recommend a colonoscopy as we cannot be sure that her FIT test is positive due to hemorrhoids. This will ensure that we are evaluating the issue completely

## 2024-03-22 ENCOUNTER — TELEPHONE (OUTPATIENT)
Dept: GASTROENTEROLOGY | Facility: CLINIC | Age: 59
End: 2024-03-22
Payer: COMMERCIAL

## 2024-03-22 ENCOUNTER — HOSPITAL ENCOUNTER (OUTPATIENT)
Facility: CLINIC | Age: 59
End: 2024-03-22
Attending: INTERNAL MEDICINE | Admitting: INTERNAL MEDICINE
Payer: COMMERCIAL

## 2024-03-22 DIAGNOSIS — R19.5 POSITIVE FIT (FECAL IMMUNOCHEMICAL TEST): Primary | ICD-10-CM

## 2024-03-22 NOTE — TELEPHONE ENCOUNTER
"Endoscopy Scheduling Screen    Have you had a positive Covid test in the last 14 days?  No    What is your communication preference for Instructions and/or Bowel Prep?   MyChart    What insurance is in the chart?  Other:      Ordering/Referring Provider:     PATSY NIETO      (If ordering provider performs procedure, schedule with ordering provider unless otherwise instructed. )    BMI: Estimated body mass index is 32.11 kg/m  as calculated from the following:    Height as of 2/19/24: 1.638 m (5' 4.5\").    Weight as of 2/19/24: 86.2 kg (190 lb).     Sedation Ordered  moderate sedation.   If patient BMI > 50 do not schedule in [facility].    If patient BMI > 45 do not schedule at [facility].    Are you taking methadone or Suboxone?  No    Have you had difficulties, pain, or discomfort during past endoscopy procedures?  No    Are you taking any prescription medications for pain 3 or more times per week?   NO, No RN review required.    Do you have a history of malignant hyperthermia?  No    (Females) Are you currently pregnant?   No     Have you been diagnosed or told you have pulmonary hypertension?   No    Do you have an LVAD?  No    Have you been told you have moderate to severe sleep apnea?  No    Have you been told you have COPD, asthma, or any other lung disease?  Yes     What breathing problems do you have?  Asthma     Do you use home oxygen?  No    Have your breathing problems required an ED visit or hospitalization in the last year?  No    Do you have any heart conditions?  No     Have you ever had or are you waiting for an organ transplant?  No. Continue scheduling, no site restrictions.    Have you had a stroke or transient ischemic attack (TIA aka \"mini stroke\" in the last 6 months?   No    Have you been diagnosed with or been told you have cirrhosis of the liver?   No    Are you currently on dialysis?   No    Do you need assistance transferring?   No    BMI: Estimated body mass index is 32.11 kg/m  as calculated " "from the following:    Height as of 2/19/24: 1.638 m (5' 4.5\").    Weight as of 2/19/24: 86.2 kg (190 lb).     Is patients BMI > 40 and scheduling location [facility]?  No    Do you take an injectable medication for weight loss or diabetes (excluding insulin)?  Yes, hold time can be up to 7 days. Please consult with you prescribing provider to discuss endoscopy recommendations.     Do you take the medication Naltrexone?  No    Do you take blood thinners?  No       Prep   Are you currently on dialysis or do you have chronic kidney disease?  No    Do you have a diagnosis of diabetes?  Yes (Golytely Prep)    Do you have a diagnosis of cystic fibrosis (CF)?  No    On a regular basis do you go 3 -5 days between bowel movements?  No    BMI > 40?  No    Preferred Pharmacy:    [facility] [id number] [place] [facility] [address]  [id number]  [place] [address]  Phone: [phone] Fax: [phone]      Final Scheduling Details     Procedure scheduled  Colonoscopy    Surgeon:  AZAEL     Date of procedure:  07/05/2024     Pre-OP / PAC:   No - Not required for this site.    Location  [facility] - Per order.    Sedation   Moderate Sedation - Patient preference.      Patient Reminders:   You will receive a call from a Nurse to review instructions and health history.  This assessment must be completed prior to your procedure.  Failure to complete the Nurse assessment may result in the procedure being cancelled.      On the day of your procedure, please designate an adult(s) who can drive you home stay with you for the next 24 hours. The medicines used in the exam will make you sleepy. You will not be able to drive.      You cannot take public transportation, ride share services, or non-medical taxi service without a responsible caregiver.  Medical transport services are allowed with the requirement that a responsible caregiver will receive you at your destination.  We require that drivers and caregivers are " confirmed prior to your procedure.

## 2024-03-28 ENCOUNTER — MYC MEDICAL ADVICE (OUTPATIENT)
Dept: FAMILY MEDICINE | Facility: CLINIC | Age: 59
End: 2024-03-28
Payer: COMMERCIAL

## 2024-03-28 DIAGNOSIS — E11.9 TYPE 2 DIABETES MELLITUS WITHOUT COMPLICATION, WITHOUT LONG-TERM CURRENT USE OF INSULIN (H): Primary | ICD-10-CM

## 2024-03-28 NOTE — TELEPHONE ENCOUNTER
Zafar, please see the additional my chart message.       Sharyn Drummond RN  [facility]

## 2024-04-02 NOTE — DISCHARGE SUMMARY
[facility]  Hospitalist Discharge Summary      Date of Admission:  3/18/2022  Date of Discharge:  3/22/2022  Discharging Provider: Jm Londono MD, MD  Discharge Service: Hospitalist Service    Discharge Diagnoses   Spontaneous right frontal subdural hematoma: with Brain compression  ( 8 mm leftward subfalcine shift and early uncal herniation.)    History of spontaneous subdural hematoma in 2015     Type 2 diabetes:     Follow-ups Needed After Discharge   Follow-up Appointments     Follow Up ([facility])      Follow-up with primary care provider within 1 week.  Made diarrhea for   blood glucose trends and insulin dosage.  PMD to kindly review and adjust   insulin as appropriate         Follow-up and recommended labs and tests       Follow-up in 4 weeks with head CT prior.     2 week follow-up for suture removal    [phone]         {  Unresulted Labs Ordered in the Past 30 Days of this Admission     No orders found from 2/16/2022 to 3/19/2022.        Discharge Disposition   Discharged to home  Condition at discharge: Stable    Hospital Course   Mary Franco is a 56 year old female admitted on 3/18/2022. She presents to the emergency department for evaluation of persistent worsening headache similar to prior spontaneous subdural hematoma in 2015 and is found to have again a spontaneous subdural hematoma, this time right frontal parietal with 8 mm leftward subfalcine shift and early uncal herniation.      Spontaneous right frontal subdural hematoma:   History of spontaneous subdural hematoma in 2015  Patient presented to ER for evaluation of persistent worsening headache for 1 week.  Patient is on baby aspirin daily, also took some Aleve to help her pain at home  CT scan of the head showed new mixed density right frontal convexity subdural hematoma there appears to be at least some recent acute to subacute blood products.  Patient was also noticed to have 8 mm leftward subfalcine shift  Melissa called in for her daughter Judie . Judie needs to cleared to go to the army . Judie is schedule for a np virtual appointment but melissa stated if any cancellations comes up with a in person np appointment to please give her a call .   "with early uncal herniation and concern for left lateral ventricular entrapment.   No focal neurologic deficits have been appreciated.   Patient does have some speech thickening when she is tired and occasionally some word substitution when she is \"frazzled\" which are residual from her 2015 subdural.  Repeat CT on March 19 showed stable mixed density subdural hematoma overlying the right cerebral hemisphere with a stable 9 mm right-to-left midline shift     --Patient was seen and examined underwent right bur hole for evacuation of chronic subdural hematoma 3/20 by Dr. Ortega.  Continued postprocedure cares as per neurosurgery.   Monitored and drain was removed on day of discharge.   -- Patient might benefit from cerebral angiogram in future   -- Outpatient ophthalmology follow-up     Type 2 diabetes: Currently with hyperglycemia, glucose of 285 on arrival.  Reports she is no longer on medications for diabetes, managed with diet and exercise     -- Hemoglobin A1c 12.5   -- Continued with diet and exercise; patient reports that she has lost approximately 18 pounds with diet and exercise    --  Patient was also initiated on Lantus 10 units every morning           Recent Labs   Lab 03/22/22  1146 03/22/22  0759 03/22/22  0556 03/21/22  2130 03/21/22  1111 03/21/22  0705   * 184* 123* 202* 291* 182*      3/22: Increase Lantus to 14 units and add carb coverage. (Goal blood glucose less than 180)  RN requested for bedside teaching for insulin    -- Once her HgA1c is improved , then they can continue to work with the PCP if her diabetes can be managed with oral hypoglycemic agents only       Diet: Advance Diet as Tolerated: Regular     Consultations This Hospital Stay   NEUROLOGY IP CONSULT  NEUROSURGERY IP CONSULT  PHYSICAL THERAPY ADULT IP CONSULT  OCCUPATIONAL THERAPY ADULT IP CONSULT  OCCUPATIONAL THERAPY ADULT IP CONSULT  PHYSICAL THERAPY ADULT IP CONSULT    Code Status   Full Code    Time Spent on this Encounter "   I, Jm Londono MD, MD, personally saw the patient today and spent greater than 30 minutes discharging this patient.       Jm Londono MD, MD  [facility] NEUROSCIENCE UNIT  [address] [place] [address]  Phone: [phone]  ______________________________________________________________________    Physical Exam   Vital Signs: Temp: 98.2  F (36.8  C) Temp src: Oral BP: 116/75 Pulse: 74   Resp: 18 SpO2: 97 % O2 Device: None (Room air)    Weight: 190 lbs 0 oz  Gen- pleasant lying in bed  HEENT- [place].  Neck- supple, no JVD elevation, no thyromegaly  CVS- I+II+ no m/r/g  RS- CTAB  Abdo- soft, no tenderness . No g/r/r  Ext- no edema   CNS- AOx3    Primary Care Physician   Physician No Ref-Primary    Discharge Orders      Diabetes Educator Referral      Reason for your hospital stay    Subdural evacuatiojn     Follow-up and recommended labs and tests     Follow-up in 4 weeks with head CT prior.     2 week follow-up for suture removal    [phone]     Activity    Your activity upon discharge: activity as tolerated, no lifting more than 10 lbs     Wound care and dressings    Instructions to care for your wound at home: may get incision wet in shower but do not soak or scrub.     Discharge Instructions    Discharge instructions:  No lifting of more than 10 pounds until follow up visit.  Ok to shampoo hair, shower or bathe, but, do not scrub or submerge incision under water until evaluated post operatively in clinic.    Ok to walk as tolerated, avoid bedrest.    No contact sports until after follow up visit  No high impact activities such as; running/jogging, snowmobile or 4 waggoner riding or any other recreational vehicles    Call my office at [phone] for increasing redness, swelling or pus draining from the incision, increased pain or any other questions and concerns.    Go to ER with any seizure activity, mental status change (increasing confusion), difficulty with speech or  increasing or acute weakness     Follow Up ([facility])    Follow-up with primary care provider within 1 week.  Made diarrhea for blood glucose trends and insulin dosage.  PMD to kindly review and adjust insulin as appropriate     Diet    Follow this diet upon discharge: Orders Placed This Encounter      Advance Diet as Tolerated: Regular Diet Adult       Significant Results and Procedures   Results for orders placed or performed during the hospital encounter of 03/18/22   CT Head w/o Contrast     Value    Radiologist flags Acute intracranial hemorrhage (AA)    Narrative    EXAM: CT HEAD W/O CONTRAST  LOCATION: [facility]  DATE/TIME: 3/18/2022 8:57 PM    INDICATION: Headache, intracranial hemorrhage suspected.  COMPARISON: 01/13/2017.  TECHNIQUE: Routine CT Head without IV contrast. Multiplanar reformats. Dose reduction techniques were used.    FINDINGS:  INTRACRANIAL CONTENTS: There is a new mixed density right frontal convexity subdural hematoma measuring up to approximately 13 mm in radial thickness. The hematoma is predominantly hypodense, but there are smaller hyperdense components suggesting at   least some acute/subacute component. There is also a hyperdense subdural hematoma component along the superior aspect of the right tentorial leaflet measuring up to approximately 3 mm in thickness. There is significant associated mass effect with up to   approximately 8 mm of leftward midline shift at the level of the foramen of Monro (series 2 image 12). There is significant narrowing of the right lateral ventricle and third ventricle with suggestion of slight dilatation of the atrium, occipital horn,   and temporal horn of left lateral ventricle, concerning for early left lateral ventricular entrapment. Early right uncal herniation is noted (series 2 image 9) with medialization of the right uncus/mesial temporal lobe over the tentorial leaflet and mild   narrowing of the ambient cisterns. No  cerebellar tonsillar herniation seen.    No acute intraparenchymal hemorrhage is identified. No intraventricular hemorrhage seen. The cortical gray-white distinction appears to be maintained.    VISUALIZED ORBITS/SINUSES/MASTOIDS: No intraorbital abnormality. No paranasal sinus mucosal disease. No middle ear or mastoid effusion.    BONES/SOFT TISSUES: No acute abnormality. Redemonstrated wide left craniotomy changes, as before.      Impression    IMPRESSION:  1.  New mixed density right frontal convexity subdural hematoma, as described. There appear to be at least some recent (acute to subacute) blood products within the collection. There is a recent appearing right tentorial leaflet subdural hematoma.   Associated mass effect resulting in up to approximately 8 mm of leftward subfalcine shift and early right uncal herniation. Urgent Neurosurgery consultation recommended.  2.  Narrowing of the right lateral ventricle and third ventricle with slight dilatation of the left lateral ventricle, concerning for early left lateral ventricular entrapment.    Findings were discussed with ordering provider Cyndee Cantu by myself at approximately 9:14 PM on 03/18/2022.      [Critical Result: Acute intracranial hemorrhage]    Finding was identified on 3/18/2022 9:06 PM.     Cyndee Cantu  was contacted by me on 3/18/2022 9:14 PM and verbalized understanding of the critical result.    CT Head w/o Contrast    Narrative    EXAM: CT HEAD WITHOUT CONTRAST  LOCATION: [facility]  DATE/TIME: 03/19/2022, 5:40 AM    INDICATION: Cerebral hemorrhage suspected. Headache, intracranial hemorrhage suspected.  COMPARISON: 03/18/2022.  TECHNIQUE: Routine CT Head without IV contrast. Multiplanar reformats. Dose reduction techniques were used.    FINDINGS:  INTRACRANIAL CONTENTS: Stable mixed predominantly low-density subdural hemorrhage overlying the right frontal lobe. Essentially stable mass effect with a 9 mm  right-to-left midline shift. Stable subdural hematoma along the right tentorial leaflet. No CT   evidence of acute infarct. Normal parenchymal attenuation. Stable asymmetric prominence of the left lateral ventricle suggesting early trapped ventricle. There is stable right uncal herniation. There may be a small amount of subarachnoid hemorrhage   anterior to the right temporal lobe (axial image 9 series 3).     VISUALIZED ORBITS/SINUSES/MASTOIDS: No intraorbital abnormality. No paranasal sinus mucosal disease. No middle ear or mastoid effusion.    BONES/SOFT TISSUES: Old left craniotomy changes.      Impression    IMPRESSION:  1.  Stable mixed density subdural hematoma overlying the right cerebral hemisphere with a stable 9 mm right-to-left midline shift.  2.  Stable thin subdural hematoma along the right tentorial leaflet.  3.  Stable prominence of the left lateral ventricle worrisome for early entrapment.  4.  Stable mild right uncal herniation.  5.  There may be a small amount of subarachnoid hemorrhage anterior to the right uncus as detailed above.  6.  No evidence for acute infarct.     CT Head w/o Contrast    Narrative    EXAM: CT HEAD W/O CONTRAST  LOCATION: [facility]  DATE/TIME: 3/21/2022 5:50 AM    INDICATION: Subdural hematoma.  COMPARISON: 03/19/2022.  TECHNIQUE: Routine CT Head without IV contrast. Multiplanar reformats. Dose reduction techniques were used.    FINDINGS:  INTRACRANIAL CONTENTS: Interval postsurgical changes relating to drainage of previously demonstrated mixed attenuation right-sided subdural hematoma. There is a new subdural drain in place entering through a right frontal stacey hole. There is a small   residual right-sided subdural collection significantly decreased in size with maximal thickness of 2 mm in the posterior temporal region. There is a persistent thin subdural component along the right tentorial leaflet. Significantly improved intracranial   mass  effect with mild residual effacement of the right lateral ventricle and persistent 3 mm of right to left shift of midline structures compared with 9 mm on the preoperative study. No evidence of interval acute intracranial hemorrhage or infarct.     VISUALIZED ORBITS/SINUSES/MASTOIDS: No intraorbital abnormality. No paranasal sinus mucosal disease. No middle ear or mastoid effusion.    BONES/SOFT TISSUES: Left-sided cranioplasty. Right frontal stacey hole.      Impression    IMPRESSION:  1.  Interval postsurgical changes of right-sided subdural hematoma drainage. No postprocedure complication is identified.  2.  There is a persistent but significantly smaller right-sided subdural collection.  3.  Decreased intracranial mass effect.                      Discharge Medications   Discharge Medication List as of 3/22/2022 12:26 PM      START taking these medications    Details   Alcohol Swabs PADS Use to swab the area of the injection or mike as directed Per insurance coverage, Disp-100 each, R-0, E-Prescribe      blood glucose (NO BRAND SPECIFIED) lancets standard To use to test glucose level in the blood Use to test blood sugar  4  times daily as directed. To accompany glucose monitor brands per insurance coverage.Disp-100 each, [id number]      blood glucose (NO BRAND SPECIFIED) test strip To use to test glucose level in the blood Use to test blood sugar  4 times daily as directed. To accompany glucose monitor brands per insurance coverage., Disp-120 strip, R-3, E-Prescribe      blood glucose calibration (NO BRAND SPECIFIED) solution Used to calibrate the blood glucose monitor as needed and as directed.  To accompany  blood glucose brands per insurance coverage, Disp-30 each, R-3, E-Prescribe      blood glucose monitoring (NO BRAND SPECIFIED) meter device kit Use as directed Per insurance coverageDisp-1 kit, [id number]      glucose (BD GLUCOSE) 4 g chewable tablet Take 4 tablets by mouth every 15 minutes as  needed for low blood sugar, Disp-50 tablet, R-0, E-Prescribe      !! insulin aspart (NOVOLOG PEN) 100 UNIT/ML pen Inject 1-7 Units Subcutaneous 3 times daily (before meals) Correction Scale - MEDIUM INSULIN RESISTANCE DOSING   Do Not give Correction Insulin if Pre-Meal BG less than 140. For Pre-Meal  - 189 give 1 unit. For Pre-Meal  - 239 give 2 units. F or Pre-Meal  - 289 give 3 units. For Pre-Meal  - 339 give 4 units. For Pre-Meal - 399 give 5 units. For Pre-Meal -449 give 6 units For Pre-Meal BG greater than or equal to 450 give 7 units. To be given with prandial insulin, and b ased on pre-meal blood glucose.  Notify provider if glucose greater than or equal to 350 mg/dL after administration of correction dose. If given at mealtime, administer within 30 minutes of start of meal., Disp-15 mL, R-1, E-PrescribeCan change to equiv alent alternative, if NovoLog is not covered by patient's insurance      !! insulin aspart (NOVOLOG PEN) 100 UNIT/ML pen Inject 1-5 Units Subcutaneous At Bedtime, Disp-15 mL, R-1, E-PrescribeCan change to equivalent alternative Humalog, if NovoLog is not covered by patient's insurance      !! insulin aspart (NOVOLOG PEN) 100 UNIT/ML pen DOSE:  1 units per 15 grams of carbohydrate 3 times with meals.  Only chart total amount of units given.  Do not give if pre-prandial glucose is less than 60 mg/dL. If given at mealtime, administer within 30 minutes of start of meal., Disp-15 mL, R-1, E- PrescribeCan change to equivalent alternative Humalog, if NovoLog is not covered by patient's insurance      insulin glargine (LANTUS PEN) 100 UNIT/ML pen Inject 14 Units Subcutaneous every morning (before breakfast), Disp-15 mL, R-1, E-PrescribeIf Lantus is not covered by insurance, may substitute Basaglar or Semglee or other insulin glargine product per insurance preference at same dose and frequency.         insulin pen needle (32G X 4 MM) 32G X 4 MM miscellaneous Use  as directed by provider Per insurance coverageDisp-100 each, [id number]      oxyCODONE (ROXICODONE) 5 MG tablet Take 1 tablet (5 mg) by mouth every 4 hours as needed for moderate to severe pain, Disp-20 tablet, R-0, E-Prescribe      senna-docusate (SENOKOT-S/PERICOLACE) 8.6-50 MG tablet Take 1 tablet by mouth 2 times daily as needed for constipation, Disp-30 tablet, R-1, E-Prescribe      Sharps Container MISC Use as directed to dispose of needles, lancets and other sharps Per Insurance coverage, Disp-1 each, R-0, E-Prescribe       !! - Potential duplicate medications found. Please discuss with provider.      CONTINUE these medications which have CHANGED    Details   aspirin 81 MG EC tablet Take 1 tablet (81 mg) by mouth daily, Transitional         CONTINUE these medications which have NOT CHANGED    Details   APPLE CIDER VINEGAR PO Take by mouth daily, Historical      calcium-magnesium (CALMAG) 500-250 MG TABS Take 1 tablet by mouth daily, Historical      cetirizine (ZYRTEC ALLERGY) 10 MG tablet Take 1 tablet (10 mg) by mouth daily, Disp-30 tablet, R-4, E-Prescribe      mometasone (NASONEX) 50 MCG/ACT nasal spray Spray 2 sprays into both nostrils daily, Historical      multivitamin, therapeutic (THERA-VIT) TABS Take 1 tablet by mouth daily, Historical      Omega-3 Fatty Acids (OMEGA-3 FISH OIL PO) Take 1 g by mouth daily, Historical      VITAMIN E PO Take 1 tablet by mouth daily, Historical           Allergies   Allergies   Allergen Reactions     Penicillins Shortness Of Breath, Hives and Difficulty breathing     Vicodin [Hydrocodone-Acetaminophen] Other (See Comments)     Severe headache

## 2024-04-23 DIAGNOSIS — E11.9 TYPE 2 DIABETES MELLITUS WITHOUT COMPLICATION, WITHOUT LONG-TERM CURRENT USE OF INSULIN (H): ICD-10-CM

## 2024-04-23 RX ORDER — DULAGLUTIDE 0.75 MG/.5ML
0.75 INJECTION, SOLUTION SUBCUTANEOUS
Qty: 2 ML | Refills: 2 | Status: SHIPPED | OUTPATIENT
Start: 2024-04-23 | End: 2024-05-22

## 2024-05-22 DIAGNOSIS — E11.9 TYPE 2 DIABETES MELLITUS WITHOUT COMPLICATION, WITHOUT LONG-TERM CURRENT USE OF INSULIN (H): ICD-10-CM

## 2024-05-22 RX ORDER — DULAGLUTIDE 0.75 MG/.5ML
0.75 INJECTION, SOLUTION SUBCUTANEOUS
Refills: 2 | OUTPATIENT
Start: 2024-05-22

## 2024-05-22 RX ORDER — DULAGLUTIDE 0.75 MG/.5ML
0.75 INJECTION, SOLUTION SUBCUTANEOUS
Qty: 6 ML | Refills: 0 | Status: SHIPPED | OUTPATIENT
Start: 2024-05-22 | End: 2024-08-12

## 2024-05-22 NOTE — TELEPHONE ENCOUNTER
Prescription approved per [facility] Refill Protocol.  Stefani Sabillon RN  [facility]

## 2024-06-06 RX ORDER — BISACODYL 5 MG/1
TABLET, DELAYED RELEASE ORAL
Qty: 4 TABLET | Refills: 0 | Status: SHIPPED | OUTPATIENT
Start: 2024-06-06

## 2024-06-06 NOTE — TELEPHONE ENCOUNTER
Standard Golytely Bowel Prep. Instructions were sent via [employer]. Bowel prep was sent 6/6/2024 to    [place] [id number] [place] [facility] - [place], [place] - [address]    Dorcas Alba RN Colorectal Cancer    Division of Gastroenterology at [facility]

## 2024-06-07 ENCOUNTER — TELEPHONE (OUTPATIENT)
Dept: GASTROENTEROLOGY | Facility: CLINIC | Age: 59
End: 2024-06-07
Payer: COMMERCIAL

## 2024-06-07 NOTE — TELEPHONE ENCOUNTER
Caller: Mary     Reason for Reschedule/Cancellation   (please be detailed, any staff messages or encounters to note?): Conflict with holiday      Prior to reschedule please review:  Ordering Provider: Selwyny  Sedation Determined: mod  Does patient have any ASC Exclusions, please identify?: N      Notes on Cancelled Procedure:  Procedure: Lower Endoscopy [Colonoscopy]   Date: 07/05/2024  Location: [facility]; [facility] [address] [place] [address]   Surgeon: Juventino      Rescheduled: No,         Did you cancel or rescheduled an EUS procedure? No.

## 2024-06-22 ENCOUNTER — HEALTH MAINTENANCE LETTER (OUTPATIENT)
Age: 59
End: 2024-06-22

## 2024-06-30 ENCOUNTER — TRANSFERRED RECORDS (OUTPATIENT)
Dept: MULTI SPECIALTY CLINIC | Facility: CLINIC | Age: 59
End: 2024-06-30

## 2024-06-30 LAB — RETINOPATHY: NORMAL

## 2024-07-22 DIAGNOSIS — E78.00 PURE HYPERCHOLESTEROLEMIA, UNSPECIFIED: ICD-10-CM

## 2024-07-22 RX ORDER — ATORVASTATIN CALCIUM 20 MG/1
20 TABLET, FILM COATED ORAL DAILY
Qty: 90 TABLET | Refills: 0 | Status: SHIPPED | OUTPATIENT
Start: 2024-07-22

## 2024-08-12 DIAGNOSIS — E11.9 TYPE 2 DIABETES MELLITUS WITHOUT COMPLICATION, WITHOUT LONG-TERM CURRENT USE OF INSULIN (H): ICD-10-CM

## 2024-08-12 RX ORDER — DULAGLUTIDE 0.75 MG/.5ML
0.75 INJECTION, SOLUTION SUBCUTANEOUS
Qty: 6 ML | Refills: 0 | Status: SHIPPED | OUTPATIENT
Start: 2024-08-12

## 2024-08-12 NOTE — TELEPHONE ENCOUNTER
Due for office visit, only 3 months supply submitted.    Please call and inform pt to schedule Office visit for a diabetes check with PCP.

## 2024-08-12 NOTE — TELEPHONE ENCOUNTER
Let patient know 3 month supply was sent to pharmacy. Scheduled her for an office visit, diabetes check with PCP on 10/9/24.     Hal   Visit Facilitator

## 2024-08-26 DIAGNOSIS — E11.9 TYPE 2 DIABETES MELLITUS WITHOUT COMPLICATION, WITHOUT LONG-TERM CURRENT USE OF INSULIN (H): ICD-10-CM

## 2024-08-26 RX ORDER — METFORMIN HCL 500 MG
TABLET, EXTENDED RELEASE 24 HR ORAL
Qty: 360 TABLET | Refills: 1 | Status: SHIPPED | OUTPATIENT
Start: 2024-08-26

## 2024-08-31 ENCOUNTER — HEALTH MAINTENANCE LETTER (OUTPATIENT)
Age: 59
End: 2024-08-31

## 2024-10-03 ENCOUNTER — HOSPITAL ENCOUNTER (OUTPATIENT)
Dept: MAMMOGRAPHY | Facility: CLINIC | Age: 59
Discharge: HOME | End: 2024-10-03
Admitting: PHYSICIAN ASSISTANT
Payer: COMMERCIAL

## 2024-10-03 DIAGNOSIS — Z12.31 VISIT FOR SCREENING MAMMOGRAM: ICD-10-CM

## 2024-10-03 PROCEDURE — 77063 BREAST TOMOSYNTHESIS BI: CPT

## 2024-10-08 ASSESSMENT — ASTHMA QUESTIONNAIRES
QUESTION_5 LAST FOUR WEEKS HOW WOULD YOU RATE YOUR ASTHMA CONTROL: COMPLETELY CONTROLLED
QUESTION_1 LAST FOUR WEEKS HOW MUCH OF THE TIME DID YOUR ASTHMA KEEP YOU FROM GETTING AS MUCH DONE AT WORK, SCHOOL OR AT HOME: NONE OF THE TIME
QUESTION_2 LAST FOUR WEEKS HOW OFTEN HAVE YOU HAD SHORTNESS OF BREATH: NOT AT ALL
ACT_TOTALSCORE: 25
QUESTION_3 LAST FOUR WEEKS HOW OFTEN DID YOUR ASTHMA SYMPTOMS (WHEEZING, COUGHING, SHORTNESS OF BREATH, CHEST TIGHTNESS OR PAIN) WAKE YOU UP AT NIGHT OR EARLIER THAN USUAL IN THE MORNING: NOT AT ALL
QUESTION_4 LAST FOUR WEEKS HOW OFTEN HAVE YOU USED YOUR RESCUE INHALER OR NEBULIZER MEDICATION (SUCH AS ALBUTEROL): NOT AT ALL

## 2024-10-09 ENCOUNTER — OFFICE VISIT (OUTPATIENT)
Dept: FAMILY MEDICINE | Facility: CLINIC | Age: 59
End: 2024-10-09
Payer: COMMERCIAL

## 2024-10-09 VITALS
HEART RATE: 84 BPM | WEIGHT: 194.1 LBS | TEMPERATURE: 96.8 F | HEIGHT: 63 IN | DIASTOLIC BLOOD PRESSURE: 74 MMHG | RESPIRATION RATE: 16 BRPM | SYSTOLIC BLOOD PRESSURE: 109 MMHG | OXYGEN SATURATION: 99 % | BODY MASS INDEX: 34.39 KG/M2

## 2024-10-09 DIAGNOSIS — J45.20 MILD INTERMITTENT ASTHMA WITHOUT COMPLICATION: Chronic | ICD-10-CM

## 2024-10-09 DIAGNOSIS — Z00.00 ROUTINE GENERAL MEDICAL EXAMINATION AT A HEALTH CARE FACILITY: Primary | ICD-10-CM

## 2024-10-09 DIAGNOSIS — E11.9 TYPE 2 DIABETES MELLITUS WITHOUT COMPLICATION, WITHOUT LONG-TERM CURRENT USE OF INSULIN (H): ICD-10-CM

## 2024-10-09 DIAGNOSIS — E78.00 PURE HYPERCHOLESTEROLEMIA, UNSPECIFIED: ICD-10-CM

## 2024-10-09 DIAGNOSIS — Z12.11 SPECIAL SCREENING FOR MALIGNANT NEOPLASMS, COLON: ICD-10-CM

## 2024-10-09 LAB
ALBUMIN SERPL BCG-MCNC: 4.4 G/DL (ref 3.5–5.2)
ALP SERPL-CCNC: 75 U/L (ref 40–150)
ALT SERPL W P-5'-P-CCNC: 24 U/L (ref 0–50)
ANION GAP SERPL CALCULATED.3IONS-SCNC: 10 MMOL/L (ref 7–15)
AST SERPL W P-5'-P-CCNC: 22 U/L (ref 0–45)
BILIRUB SERPL-MCNC: 0.5 MG/DL
BUN SERPL-MCNC: 10.9 MG/DL (ref 6–20)
CALCIUM SERPL-MCNC: 9.4 MG/DL (ref 8.8–10.4)
CHLORIDE SERPL-SCNC: 103 MMOL/L (ref 98–107)
CHOLEST SERPL-MCNC: 131 MG/DL
CREAT SERPL-MCNC: 0.72 MG/DL (ref 0.51–0.95)
CREAT UR-MCNC: 21.4 MG/DL
EGFRCR SERPLBLD CKD-EPI 2021: >90 ML/MIN/1.73M2
EST. AVERAGE GLUCOSE BLD GHB EST-MCNC: 151 MG/DL
FASTING STATUS PATIENT QL REPORTED: YES
FASTING STATUS PATIENT QL REPORTED: YES
GLUCOSE SERPL-MCNC: 93 MG/DL (ref 70–99)
HBA1C MFR BLD: 6.9 % (ref 0–5.6)
HCO3 SERPL-SCNC: 28 MMOL/L (ref 22–29)
HDLC SERPL-MCNC: 46 MG/DL
LDLC SERPL CALC-MCNC: 65 MG/DL
MICROALBUMIN UR-MCNC: <12 MG/L
MICROALBUMIN/CREAT UR: NORMAL MG/G{CREAT}
NONHDLC SERPL-MCNC: 85 MG/DL
POTASSIUM SERPL-SCNC: 3.9 MMOL/L (ref 3.4–5.3)
PROT SERPL-MCNC: 7.2 G/DL (ref 6.4–8.3)
SODIUM SERPL-SCNC: 141 MMOL/L (ref 135–145)
TRIGL SERPL-MCNC: 98 MG/DL

## 2024-10-09 PROCEDURE — 36415 COLL VENOUS BLD VENIPUNCTURE: CPT | Performed by: PHYSICIAN ASSISTANT

## 2024-10-09 PROCEDURE — 91320 SARSCV2 VAC 30MCG TRS-SUC IM: CPT | Performed by: PHYSICIAN ASSISTANT

## 2024-10-09 PROCEDURE — 90673 RIV3 VACCINE NO PRESERV IM: CPT | Performed by: PHYSICIAN ASSISTANT

## 2024-10-09 PROCEDURE — 90471 IMMUNIZATION ADMIN: CPT | Performed by: PHYSICIAN ASSISTANT

## 2024-10-09 PROCEDURE — 99396 PREV VISIT EST AGE 40-64: CPT | Mod: 25 | Performed by: PHYSICIAN ASSISTANT

## 2024-10-09 PROCEDURE — 90480 ADMN SARSCOV2 VAC 1/ONLY CMP: CPT | Performed by: PHYSICIAN ASSISTANT

## 2024-10-09 PROCEDURE — 80053 COMPREHEN METABOLIC PANEL: CPT | Performed by: PHYSICIAN ASSISTANT

## 2024-10-09 PROCEDURE — 80061 LIPID PANEL: CPT | Performed by: PHYSICIAN ASSISTANT

## 2024-10-09 PROCEDURE — 82043 UR ALBUMIN QUANTITATIVE: CPT | Performed by: PHYSICIAN ASSISTANT

## 2024-10-09 PROCEDURE — 99214 OFFICE O/P EST MOD 30 MIN: CPT | Mod: 25 | Performed by: PHYSICIAN ASSISTANT

## 2024-10-09 PROCEDURE — 83036 HEMOGLOBIN GLYCOSYLATED A1C: CPT | Performed by: PHYSICIAN ASSISTANT

## 2024-10-09 PROCEDURE — 82570 ASSAY OF URINE CREATININE: CPT | Performed by: PHYSICIAN ASSISTANT

## 2024-10-09 RX ORDER — METFORMIN HYDROCHLORIDE 500 MG/1
TABLET, EXTENDED RELEASE ORAL
Qty: 360 TABLET | Refills: 1 | Status: SHIPPED | OUTPATIENT
Start: 2024-10-09

## 2024-10-09 RX ORDER — ATORVASTATIN CALCIUM 20 MG/1
20 TABLET, FILM COATED ORAL DAILY
Qty: 90 TABLET | Refills: 0 | Status: SHIPPED | OUTPATIENT
Start: 2024-10-09

## 2024-10-09 SDOH — HEALTH STABILITY: PHYSICAL HEALTH: ON AVERAGE, HOW MANY MINUTES DO YOU ENGAGE IN EXERCISE AT THIS LEVEL?: 30 MIN

## 2024-10-09 SDOH — HEALTH STABILITY: PHYSICAL HEALTH: ON AVERAGE, HOW MANY DAYS PER WEEK DO YOU ENGAGE IN MODERATE TO STRENUOUS EXERCISE (LIKE A BRISK WALK)?: 3 DAYS

## 2024-10-09 ASSESSMENT — ASTHMA QUESTIONNAIRES: ACT_TOTALSCORE: 25

## 2024-10-09 ASSESSMENT — SOCIAL DETERMINANTS OF HEALTH (SDOH): HOW OFTEN DO YOU GET TOGETHER WITH FRIENDS OR RELATIVES?: MORE THAN THREE TIMES A WEEK

## 2024-10-09 ASSESSMENT — PAIN SCALES - GENERAL: PAINLEVEL: NO PAIN (0)

## 2024-10-09 NOTE — PROGRESS NOTES
"Preventive Care Visit  [facility] JESSICA Jerome PA-C, Family Medicine  Oct 9, 2024      Assessment & Plan     Routine general medical examination at a health care facility      Type 2 diabetes mellitus without complication, without long-term current use of insulin (H)    For now will continue with current dose of Trulicity as A1C has been well controlled.  If she would like to consider increasing the medication to help with weight, may consider moving to mounjaro or ozempic.  - Lipid panel reflex to direct LDL Non-fasting; Future  - Albumin Random Urine Quantitative with Creat Ratio; Future  - HEMOGLOBIN A1C; Future  - dulaglutide (TRULICITY) 0.75 MG/0.5ML pen; Inject 0.75 mg subcutaneously every 7 days.  - metFORMIN (GLUCOPHAGE XR) 500 MG 24 hr tablet; TAKE 4 TABLETS BY MOUTH DAILY  - Comprehensive metabolic panel (BMP + Alb, Alk Phos, ALT, AST, Total. Bili, TP); Future  - Lipid panel reflex to direct LDL Non-fasting  - Albumin Random Urine Quantitative with Creat Ratio  - HEMOGLOBIN A1C  - Comprehensive metabolic panel (BMP + Alb, Alk Phos, ALT, AST, Total. Bili, TP)    Pure hypercholesterolemia, unspecified  stable  - atorvastatin (LIPITOR) 20 MG tablet; Take 1 tablet (20 mg) by mouth daily.    Mild intermittent asthma without complication  stable    Special screening for malignant neoplasms, colon  - Adult GI  Referral - Procedure Only; Future            BMI  Estimated body mass index is 34.38 kg/m  as calculated from the following:    Height as of this encounter: 1.6 m (5' 3\").    Weight as of this encounter: 88 kg (194 lb 1.6 oz).   Weight management plan: Discussed healthy diet and exercise guidelines    Counseling  Appropriate preventive services were addressed with this patient via screening, questionnaire, or discussion as appropriate for fall prevention, nutrition, physical activity, Tobacco-use cessation, social engagement, weight loss and cognition.  " Group Psychotherapy       Group Focus: Promoting Healthy Lifestyles   Group Topic: Anger Management: Therapist assisted with understanding of treatment plan and identification of anger symptoms. Pt explored anger triggers and coping mechanisms.     Number of patients in attendance: 9    Group Start Time: 1045  Group End Time:  1130  Groups Date: 9/14/2023  Group Topic:  Behavioral Health  Group Department: [facility] - Behavioral [facility]  Group Facilitators:  Maliha Jacobs MSW  _____________________________________________________________________    Patient Name: Jurgen Berg  MRN: [id number]  Patient Class: IP- Psych   Admission Date\Time: 9/9/2023  6:35 PM  Hospital Length of Stay: 6  Primary Care Provider: Qiana, Primary Doctor     Referred by: Acute Psychiatry Unit Treatment Team     Target symptoms: Poor Coping Skills     Patient's response to treatment: Not Participating; Pt was not present in group session; alternative provided.      Progress toward goals: Progressing slowly     Interval History:      Diagnosis:      Plan: Continue treatment on AP[facility]     Checklist reviewing preventive services available has been given to the patient.  Reviewed patient's diet, addressing concerns and/or questions.   She is at risk for lack of exercise and has been provided with information to increase physical activity for the benefit of her well-being.   She is at risk for psychosocial distress and has been provided with information to reduce risk.       Follow up in 6 months    Jasmyne Hernandez is a 59 year old, presenting for the following:  Diabetes, Recheck Medication, and Physical        10/9/2024     8:14 AM   Additional Questions   Roomed by Char [facility]ES        Health Care Directive  Patient does not have a Health Care Directive or Living Will: Discussed advance care planning with patient; however, patient declined at this time.    Healthy Habits:     Taking medications regularly:  0  History of Present Illness       Diabetes:   She presents for follow up of diabetes.   She is checking home blood glucose with a continuous glucose monitor.   She checks blood glucose before and after meals and at bedtime.  Blood glucose is sometimes over 200 and never under 70.  When her blood glucose is low, the patient is asymptomatic for confusion, blurred vision, lethargy and reports not feeling dizzy, shaky, or weak.  She is concerned about other.    She is not experiencing numbness or burning in feet, excessive thirst, blurry vision, weight changes or redness, sores or blisters on feet. The patient has not had a diabetic eye exam in the last 12 months.          Hyperlipidemia:  She presents for follow up of hyperlipidemia.   She is taking medication to lower cholesterol. She is not having myalgia or other side effects to statin medications.    She eats 4 or more servings of fruits and vegetables daily.She consumes 0 sweetened beverage(s) daily.She exercises with enough effort to increase her heart rate 30 to 60 minutes per day.  She exercises with enough effort to increase her heart rate 3 or  less days per week.   She is taking medications regularly.    Using Trulicity 0.75 based on limited availability of the 1.5 mg dose.  She is not having SE.  Blood sugars are mostly in range except for when she eats something sweet. At these times she does notice some spikes.         10/9/2024   General Health   How would you rate your overall physical health? Good   Feel stress (tense, anxious, or unable to sleep) Only a little      (!) STRESS CONCERN      10/9/2024   Nutrition   Three or more servings of calcium each day? Yes   Diet: Regular (no restrictions)   How many servings of fruit and vegetables per day? (!) 2-3   How many sweetened beverages each day? 0-1            10/9/2024   Exercise   Days per week of moderate/strenous exercise 3 days   Average minutes spent exercising at this level 30 min            10/9/2024   Social Factors   Frequency of gathering with friends or relatives More than three times a week   Worry food won't last until get money to buy more No   Food not last or not have enough money for food? No   Do you have housing? (Housing is defined as stable permanent housing and does not include staying ouside in a car, in a tent, in an abandoned building, in an overnight shelter, or couch-surfing.) Yes   Are you worried about losing your housing? No   Lack of transportation? No   Unable to get utilities (heat,electricity)? No            10/9/2024   Fall Risk   Fallen 2 or more times in the past year? No   Trouble with walking or balance? No             10/9/2024   Dental   Dentist two times every year? Yes            10/9/2024   TB Screening   Were you born outside of the [place]? No        Today's PHQ-2 Score:       10/9/2024     8:15 AM   PHQ-2 ( 1999 [employer])   Q1: Little interest or pleasure in doing things 0   Q2: Feeling down, depressed or hopeless 0   PHQ-2 Score 0         10/9/2024   Substance Use   Alcohol more than 3/day or more than 7/wk No   Do you use any other substances recreationally?  No        Social History     Tobacco Use    Smoking status: Never    Smokeless tobacco: Never   Vaping Use    Vaping status: Never Used   Substance Use Topics    Alcohol use: No     Alcohol/week: 0.0 standard drinks of alcohol    Drug use: No         10/3/2024   LAST FHS-7 RESULTS   1st degree relative breast or ovarian cancer No   Any relative bilateral breast cancer No   Any male have breast cancer No   Any ONE woman have BOTH breast AND ovarian cancer No   Any woman with breast cancer before 50yrs No   2 or more relatives with breast AND/OR ovarian cancer No   2 or more relatives with breast AND/OR bowel cancer Yes      Mammogram Screening - Annual screen due to greater than 20% lifetime risk as estimated by Breast Cancer Risk Calculator        10/9/2024   STI Screening   New sexual partner(s) since last STI/HIV test? No        History of abnormal Pap smear: No - age 30- 64 PAP with HPV every 5 years recommended        Latest Ref Rng & Units 4/21/2022     5:35 PM 11/11/2015     8:07 AM 11/11/2015    12:00 AM   PAP / HPV   PAP  Negative for Intraepithelial Lesion or Malignancy (NILM)      PAP (Historical)    NIL    HPV 16 DNA Negative Negative  Negative     HPV 18 DNA Negative Negative  Negative     Other HR HPV Negative Negative  Negative       ASCVD Risk   The 10-year ASCVD risk score (Leann KING, et al., 2019) is: 3.4%    Values used to calculate the score:      Age: 59 years      Sex: Female      Is Non- : No      Diabetic: Yes      Tobacco smoker: No      Systolic Blood Pressure: 112 mmHg      Is BP treated: No      HDL Cholesterol: 49 mg/dL      Total Cholesterol: 150 mg/dL      Reviewed and updated as needed this visit by Provider                    Past Medical History:   Diagnosis Date    Elevated blood sugar 2015    Obesity     Seasonal allergies     Subdural haemorrhage 2015    Large left-sided    Uncomplicated asthma      Past Surgical History:   Procedure Laterality  "Date    STACEY HOLE(S) EVACUATE HEMATOMA SUBDURAL Right 3/20/2022    Procedure: Right stacey hole for evacuation of chronic subdural hematoma;  Surgeon: Austin Ortega MD;  Location:  OR    COLONOSCOPY N/A 8/14/2017    Procedure: COMBINED COLONOSCOPY, SINGLE OR MULTIPLE BIOPSY/POLYPECTOMY BY BIOPSY;;  Surgeon: Katja Paredes MD;  Location:  GI    CRANIOPLASTY Left 7/9/2015    Procedure: CRANIOPLASTY;  Surgeon: Yovani Willson MD;  Location:  OR    CRANIOTOMY Left 4/4/2015    Procedure: CRANIOTOMY;  Surgeon: Yovani Willson MD;  Location:  OR    CRANIOTOMY Left 4/9/2015    Procedure: CRANIOTOMY;  Surgeon: Yovani Willson MD;  Location:  OR    CRANIOTOMY Left 2015     OB History   No obstetric history on file.         Review of Systems  Constitutional, HEENT, cardiovascular, pulmonary, GI, , musculoskeletal, neuro, skin, endocrine and psych systems are negative, except as otherwise noted.     Objective    Exam  LMP 10/14/2015    Estimated body mass index is 32.11 kg/m  as calculated from the following:    Height as of 2/19/24: 1.638 m (5' 4.5\").    Weight as of 2/19/24: 86.2 kg (190 lb).    Physical Exam  GENERAL: alert and no distress  EYES: Eyes grossly normal to inspection, PERRL and conjunctivae and sclerae normal  HENT: ear canals and TM's normal, nose and mouth without ulcers or lesions  NECK: no adenopathy, no asymmetry, masses, or scars  RESP: lungs clear to auscultation - no rales, rhonchi or wheezes  BREAST: normal without masses, tenderness or nipple discharge and no palpable axillary masses or adenopathy  CV: regular rate and rhythm, normal S1 S2, no S3 or S4, no murmur, click or rub, no peripheral edema   ABDOMEN: soft, nontender, no hepatosplenomegaly, no masses and bowel sounds normal  MS: no gross musculoskeletal defects noted, no edema  SKIN: no suspicious lesions or rashes  NEURO: Normal strength and tone, mentation intact and speech normal  PSYCH: " mentation appears normal, affect normal/bright        Signed Electronically by: Zafar Jerome PA-C

## 2024-10-15 NOTE — RESULT ENCOUNTER NOTE
Mary-  Here are your recent results.     Your labs look good.  Your A1C is at your goal.  You can continue your current medication and we can follow up in 6 months    Zafar

## 2024-10-17 ENCOUNTER — TELEPHONE (OUTPATIENT)
Dept: GASTROENTEROLOGY | Facility: CLINIC | Age: 59
End: 2024-10-17
Payer: COMMERCIAL

## 2025-01-09 ENCOUNTER — OFFICE VISIT (OUTPATIENT)
Dept: FAMILY MEDICINE | Facility: CLINIC | Age: 60
End: 2025-01-09
Payer: COMMERCIAL

## 2025-01-09 VITALS
OXYGEN SATURATION: 99 % | HEART RATE: 98 BPM | TEMPERATURE: 97.7 F | RESPIRATION RATE: 16 BRPM | DIASTOLIC BLOOD PRESSURE: 80 MMHG | SYSTOLIC BLOOD PRESSURE: 118 MMHG

## 2025-01-09 DIAGNOSIS — J02.9 SORE THROAT: Primary | ICD-10-CM

## 2025-01-09 DIAGNOSIS — J03.90 TONSILLITIS: ICD-10-CM

## 2025-01-09 DIAGNOSIS — E11.9 TYPE 2 DIABETES MELLITUS WITHOUT COMPLICATION, WITHOUT LONG-TERM CURRENT USE OF INSULIN (H): ICD-10-CM

## 2025-01-09 LAB
DEPRECATED S PYO AG THROAT QL EIA: NEGATIVE
S PYO DNA THROAT QL NAA+PROBE: DETECTED

## 2025-01-09 RX ORDER — AZITHROMYCIN 250 MG/1
TABLET, FILM COATED ORAL
Qty: 6 TABLET | Refills: 0 | Status: SHIPPED | OUTPATIENT
Start: 2025-01-09 | End: 2025-01-14

## 2025-01-09 ASSESSMENT — PAIN SCALES - GENERAL: PAINLEVEL_OUTOF10: MILD PAIN (3)

## 2025-01-09 ASSESSMENT — ENCOUNTER SYMPTOMS: SORE THROAT: 1

## 2025-01-09 NOTE — PROGRESS NOTES
Assessment & Plan     Type 2 diabetes mellitus without complication, without long-term current use of insulin (H)  Has easy to get sick with URI    Sore throat    - Streptococcus A Rapid Screen w/Reflex to PCR - Clinic Collect  - Group A Streptococcus PCR Throat Swab    Tonsillitis  Has sign of purulent postnasal drainage with exudate on pharynx, will have her to start abx   - azithromycin (ZITHROMAX) 250 MG tablet; Take 2 tablets (500 mg) by mouth daily for 1 day, THEN 1 tablet (250 mg) daily for 4 days.          Subjective   Mary is a 59 year old, presenting for the following health issues:  Pharyngitis        1/9/2025     1:07 PM   Additional Questions   Roomed by [facility]       Via the Health Maintenance questionnaire, the patient has reported the following services have been completed -Eye Exam: [employer] 2024-06-30, this information has been sent to the abstraction team.  History of Present Illness       Reason for visit:  Check for strep  Symptom onset:  1-3 days ago  Symptoms include:  Sore thoat  Symptom intensity:  Mild  Symptom progression:  Staying the same  Had these symptoms before:  No  What makes it worse:  Laying down  What makes it better:  Hot drink like tea   She is taking medications regularly.         Acute Illness  Acute illness concerns: sore throat  Onset/Duration: 4 days  Symptoms:  Fever: No  Chills/Sweats: No  Headache (location?): No  Sinus Pressure: No  Conjunctivitis:  No  Ear Pain: YES: left  Rhinorrhea: YES  Congestion: YES  Sore Throat: YES  Cough: YES-productive of yellow sputum, productive of green sputum  Wheeze: No  Decreased Appetite: No  Nausea: No  Vomiting: No  Diarrhea: No  Dysuria/Freq.: No  Dysuria or Hematuria: No  Fatigue/Achiness: YES  Sick/Strep Exposure: YES, strep exposure  Therapies tried and outcome: Mucinex         Review of Systems  Constitutional, HEENT, cardiovascular, pulmonary, GI, , musculoskeletal, neuro, skin, endocrine and psych systems are negative,  except as otherwise noted.      Objective    /80   Pulse 98   Temp 97.7  F (36.5  C) (Oral)   Resp 16   LMP 10/14/2015   SpO2 99%   There is no height or weight on file to calculate BMI.  Physical Exam   GENERAL: alert and no distress  EYES: Eyes grossly normal to inspection, PERRL and conjunctivae and sclerae normal  HENT: ear canals and TM's normal, nose and mouth without ulcers or lesions  NECK: no adenopathy, no asymmetry, masses, or scars  RESP: lungs clear to auscultation - no rales, rhonchi or wheezes  CV: regular rate and rhythm, normal S1 S2, no S3 or S4, no murmur, click or rub, no peripheral edema  ABDOMEN: soft, nontender, no hepatosplenomegaly, no masses and bowel sounds normal  MS: no gross musculoskeletal defects noted, no edema  SKIN: no suspicious lesions or rashes  NEURO: Normal strength and tone, mentation intact and speech normal  BACK: no CVA tenderness, no paralumbar tenderness  PSYCH: mentation appears normal, affect normal/bright  LYMPH: no cervical, supraclavicular, axillary, or inguinal adenopathy            Signed Electronically by: Campos Ling MD

## 2025-02-14 ENCOUNTER — HOSPITAL ENCOUNTER (OUTPATIENT)
Facility: CLINIC | Age: 60
Discharge: HOME | End: 2025-02-14
Attending: INTERNAL MEDICINE | Admitting: INTERNAL MEDICINE
Payer: COMMERCIAL

## 2025-02-14 VITALS
DIASTOLIC BLOOD PRESSURE: 76 MMHG | SYSTOLIC BLOOD PRESSURE: 117 MMHG | RESPIRATION RATE: 9 BRPM | BODY MASS INDEX: 33.65 KG/M2 | HEIGHT: 63 IN | OXYGEN SATURATION: 97 % | HEART RATE: 85 BPM | WEIGHT: 189.9 LBS

## 2025-02-14 LAB — COLONOSCOPY: NORMAL

## 2025-02-14 PROCEDURE — 99153 MOD SED SAME PHYS/QHP EA: CPT | Performed by: INTERNAL MEDICINE

## 2025-02-14 PROCEDURE — 88305 TISSUE EXAM BY PATHOLOGIST: CPT | Mod: TC | Performed by: INTERNAL MEDICINE

## 2025-02-14 PROCEDURE — 45385 COLONOSCOPY W/LESION REMOVAL: CPT | Mod: PT | Performed by: INTERNAL MEDICINE

## 2025-02-14 PROCEDURE — 250N000011 HC RX IP 250 OP 636: Performed by: INTERNAL MEDICINE

## 2025-02-14 PROCEDURE — G0500 MOD SEDAT ENDO SERVICE >5YRS: HCPCS | Performed by: INTERNAL MEDICINE

## 2025-02-14 RX ORDER — FENTANYL CITRATE 50 UG/ML
INJECTION, SOLUTION INTRAMUSCULAR; INTRAVENOUS PRN
Status: DISCONTINUED | OUTPATIENT
Start: 2025-02-14 | End: 2025-02-14 | Stop reason: HOSPADM

## 2025-02-14 ASSESSMENT — ACTIVITIES OF DAILY LIVING (ADL)
ADLS_ACUITY_SCORE: 54
ADLS_ACUITY_SCORE: 54

## 2025-02-17 LAB
PATH REPORT.COMMENTS IMP SPEC: NORMAL
PATH REPORT.COMMENTS IMP SPEC: NORMAL
PATH REPORT.FINAL DX SPEC: NORMAL
PATH REPORT.GROSS SPEC: NORMAL
PATH REPORT.MICROSCOPIC SPEC OTHER STN: NORMAL
PATH REPORT.RELEVANT HX SPEC: NORMAL
PHOTO IMAGE: NORMAL

## 2025-02-17 PROCEDURE — 88305 TISSUE EXAM BY PATHOLOGIST: CPT | Mod: 26 | Performed by: PATHOLOGY

## 2025-02-26 DIAGNOSIS — E11.9 TYPE 2 DIABETES MELLITUS WITHOUT COMPLICATION, WITHOUT LONG-TERM CURRENT USE OF INSULIN (H): ICD-10-CM

## 2025-02-28 PROBLEM — D12.6 ADENOMATOUS POLYP OF COLON: Status: ACTIVE | Noted: 2025-02-28

## 2025-03-03 ENCOUNTER — MYC REFILL (OUTPATIENT)
Dept: FAMILY MEDICINE | Facility: CLINIC | Age: 60
End: 2025-03-03
Payer: COMMERCIAL

## 2025-03-03 DIAGNOSIS — E11.9 TYPE 2 DIABETES MELLITUS WITHOUT COMPLICATION, WITHOUT LONG-TERM CURRENT USE OF INSULIN (H): ICD-10-CM

## 2025-04-19 ENCOUNTER — HEALTH MAINTENANCE LETTER (OUTPATIENT)
Age: 60
End: 2025-04-19

## 2025-04-29 DIAGNOSIS — E78.00 PURE HYPERCHOLESTEROLEMIA, UNSPECIFIED: ICD-10-CM

## 2025-04-29 RX ORDER — ATORVASTATIN CALCIUM 20 MG/1
20 TABLET, FILM COATED ORAL DAILY
Qty: 90 TABLET | Refills: 0 | Status: SHIPPED | OUTPATIENT
Start: 2025-04-29

## 2025-05-03 ENCOUNTER — LAB (OUTPATIENT)
Dept: LAB | Facility: CLINIC | Age: 60
End: 2025-05-03
Payer: COMMERCIAL

## 2025-05-03 DIAGNOSIS — E11.9 TYPE 2 DIABETES MELLITUS WITHOUT COMPLICATION, WITHOUT LONG-TERM CURRENT USE OF INSULIN (H): Primary | ICD-10-CM

## 2025-05-03 LAB
EST. AVERAGE GLUCOSE BLD GHB EST-MCNC: 154 MG/DL
HBA1C MFR BLD: 7 % (ref 0–5.6)

## 2025-05-03 PROCEDURE — 83036 HEMOGLOBIN GLYCOSYLATED A1C: CPT

## 2025-05-03 PROCEDURE — 36415 COLL VENOUS BLD VENIPUNCTURE: CPT

## 2025-05-09 ENCOUNTER — RESULTS FOLLOW-UP (OUTPATIENT)
Dept: FAMILY MEDICINE | Facility: CLINIC | Age: 60
End: 2025-05-09
Payer: COMMERCIAL

## 2025-05-09 DIAGNOSIS — E11.9 TYPE 2 DIABETES MELLITUS WITHOUT COMPLICATION, WITHOUT LONG-TERM CURRENT USE OF INSULIN (H): ICD-10-CM

## 2025-05-09 NOTE — RESULT ENCOUNTER NOTE
Mary-  Here are your recent results.    Your A1C is 7% which is essentially stable.  You can continue your current medication regimen and follow up in 6 months    Zafar

## 2025-05-12 RX ORDER — DULAGLUTIDE 0.75 MG/.5ML
0.75 INJECTION, SOLUTION SUBCUTANEOUS
Qty: 2 ML | Refills: 1 | Status: SHIPPED | OUTPATIENT
Start: 2025-05-12

## 2025-05-12 NOTE — TELEPHONE ENCOUNTER
To Provider:    Patient requesting refills of medications.    RN unable to pend, running into error within chart.    Pharmacy pended if appropriate.    Thank you.    Kasey RONQUILLO,  Triage RN  [facility]

## 2025-05-27 ENCOUNTER — TELEPHONE (OUTPATIENT)
Dept: FAMILY MEDICINE | Facility: CLINIC | Age: 60
End: 2025-05-27
Payer: COMMERCIAL

## 2025-06-03 DIAGNOSIS — E11.9 TYPE 2 DIABETES MELLITUS WITHOUT COMPLICATION, WITHOUT LONG-TERM CURRENT USE OF INSULIN (H): ICD-10-CM

## 2025-06-03 RX ORDER — METFORMIN HYDROCHLORIDE 500 MG/1
2000 TABLET, EXTENDED RELEASE ORAL
Qty: 360 TABLET | Refills: 0 | Status: SHIPPED | OUTPATIENT
Start: 2025-06-03

## 2025-06-15 DIAGNOSIS — E11.9 TYPE 2 DIABETES MELLITUS WITHOUT COMPLICATION, WITHOUT LONG-TERM CURRENT USE OF INSULIN (H): ICD-10-CM

## 2025-06-16 RX ORDER — DULAGLUTIDE 0.75 MG/.5ML
0.75 INJECTION, SOLUTION SUBCUTANEOUS
Qty: 2 ML | Refills: 1 | Status: SHIPPED | OUTPATIENT
Start: 2025-06-16

## 2025-07-29 DIAGNOSIS — E78.00 PURE HYPERCHOLESTEROLEMIA, UNSPECIFIED: ICD-10-CM

## 2025-07-30 ENCOUNTER — MYC MEDICAL ADVICE (OUTPATIENT)
Dept: FAMILY MEDICINE | Facility: CLINIC | Age: 60
End: 2025-07-30
Payer: COMMERCIAL

## 2025-07-30 RX ORDER — ATORVASTATIN CALCIUM 20 MG/1
20 TABLET, FILM COATED ORAL DAILY
Qty: 90 TABLET | Refills: 0 | Status: SHIPPED | OUTPATIENT
Start: 2025-07-30

## (undated) DEVICE — ESU GROUND PAD UNIVERSAL W/O CORD

## (undated) DEVICE — SOL NACL 0.9% IRRIG 1000ML BOTTLE 2F7124

## (undated) DEVICE — DRSG KERLIX 4 1/2"X4YDS ROLL 6715

## (undated) DEVICE — SU VICRYL 2-0 CT-2 CR 8X18" J726D

## (undated) DEVICE — SU MONOCRYL 3-0 PS-2 27" Y427H

## (undated) DEVICE — GLOVE PROTEXIS BLUE W/NEU-THERA 7.5  2D73EB75

## (undated) DEVICE — RETR ELASTIC STAYS LONE STAR BLUNT DUAL LEAD 3550-1G

## (undated) DEVICE — GLOVE PROTEXIS W/NEU-THERA 7.5  2D73TE75

## (undated) DEVICE — DRAPE POUCH INSTRUMENT 1018

## (undated) DEVICE — ESU ELEC BLADE 2.75" COATED/INSULATED E1455

## (undated) DEVICE — SPONGE SURGIFOAM 100 1974

## (undated) DEVICE — GOWN XXLG REINFORCED 9071EL

## (undated) DEVICE — PACK NEURO SNE15SNFSA

## (undated) DEVICE — DRAIN JACKSON PRATT 10FR ROUND SU130-1321

## (undated) DEVICE — PREP SKIN SCRUB TRAY 4461A

## (undated) DEVICE — GLOVE PROTEXIS MICRO 8.5  2D73PM85

## (undated) DEVICE — GLOVE PROTEXIS POWDER FREE 6.5 ORTHOPEDIC 2D73ET65

## (undated) DEVICE — ESU CORD BIPOLAR 12' E0512

## (undated) DEVICE — GLOVE PROTEXIS W/NEU-THERA 8.0  2D73TE80

## (undated) DEVICE — DRSG KERLIX FLUFFS X5

## (undated) DEVICE — GLOVE PROTEXIS BLUE W/NEU-THERA 8.0  2D73EB80

## (undated) DEVICE — DRAIN JACKSON PRATT RESERVOIR 100ML SU130-1305

## (undated) DEVICE — DRSG ADAPTIC 3X8" 6113

## (undated) DEVICE — SOL WATER IRRIG 1000ML BOTTLE 2F7114

## (undated) DEVICE — MANIFOLD NEPTUNE 4 PORT 700-20

## (undated) DEVICE — PERFORATOR 14MM CODMAN

## (undated) DEVICE — PACK UNIVERSAL SPLIT 29131

## (undated) DEVICE — RX SURGIFLO HEMOSTATIC MATRIX 10ML 199102S

## (undated) DEVICE — BLADE CLIPPER 4412A

## (undated) DEVICE — LINEN TOWEL PACK X5 5464

## (undated) DEVICE — STPL SKIN 35W ROTATING HEAD PRW35

## (undated) DEVICE — GLOVE PROTEXIS BLUE W/NEU-THERA 6.5  2D73EB65

## (undated) DEVICE — SU ETHILON 3-0 FS-1 18" 669H

## (undated) DEVICE — NDL 19GA 1.5"

## (undated) RX ORDER — CLINDAMYCIN PHOSPHATE 900 MG/50ML
INJECTION, SOLUTION INTRAVENOUS
Status: DISPENSED
Start: 2022-03-20

## (undated) RX ORDER — LIDOCAINE HYDROCHLORIDE 20 MG/ML
INJECTION, SOLUTION EPIDURAL; INFILTRATION; INTRACAUDAL; PERINEURAL
Status: DISPENSED
Start: 2022-03-20

## (undated) RX ORDER — GINSENG 100 MG
CAPSULE ORAL
Status: DISPENSED
Start: 2022-03-20

## (undated) RX ORDER — PROPOFOL 10 MG/ML
INJECTION, EMULSION INTRAVENOUS
Status: DISPENSED
Start: 2022-03-20

## (undated) RX ORDER — BUPIVACAINE HYDROCHLORIDE AND EPINEPHRINE 2.5; 5 MG/ML; UG/ML
INJECTION, SOLUTION EPIDURAL; INFILTRATION; INTRACAUDAL; PERINEURAL
Status: DISPENSED
Start: 2022-03-20

## (undated) RX ORDER — ONDANSETRON 2 MG/ML
INJECTION INTRAMUSCULAR; INTRAVENOUS
Status: DISPENSED
Start: 2022-03-20

## (undated) RX ORDER — FENTANYL CITRATE 50 UG/ML
INJECTION, SOLUTION INTRAMUSCULAR; INTRAVENOUS
Status: DISPENSED
Start: 2017-08-14

## (undated) RX ORDER — DEXAMETHASONE SODIUM PHOSPHATE 4 MG/ML
INJECTION, SOLUTION INTRA-ARTICULAR; INTRALESIONAL; INTRAMUSCULAR; INTRAVENOUS; SOFT TISSUE
Status: DISPENSED
Start: 2022-03-20

## (undated) RX ORDER — FENTANYL CITRATE 50 UG/ML
INJECTION, SOLUTION INTRAMUSCULAR; INTRAVENOUS
Status: DISPENSED
Start: 2025-02-14

## (undated) RX ORDER — FENTANYL CITRATE 50 UG/ML
INJECTION, SOLUTION INTRAMUSCULAR; INTRAVENOUS
Status: DISPENSED
Start: 2022-03-20